# Patient Record
Sex: MALE | Race: WHITE | Employment: FULL TIME | URBAN - METROPOLITAN AREA
[De-identification: names, ages, dates, MRNs, and addresses within clinical notes are randomized per-mention and may not be internally consistent; named-entity substitution may affect disease eponyms.]

---

## 2018-02-25 ENCOUNTER — HOSPITAL ENCOUNTER (EMERGENCY)
Age: 28
Discharge: HOME | End: 2018-02-25
Attending: FAMILY MEDICINE
Payer: COMMERCIAL

## 2018-02-25 VITALS
OXYGEN SATURATION: 97 % | RESPIRATION RATE: 18 BRPM | DIASTOLIC BLOOD PRESSURE: 112 MMHG | SYSTOLIC BLOOD PRESSURE: 159 MMHG | WEIGHT: 315 LBS | BODY MASS INDEX: 36.45 KG/M2 | TEMPERATURE: 97.3 F | HEIGHT: 78 IN | HEART RATE: 103 BPM

## 2018-02-25 DIAGNOSIS — S02.5XXA CLOSED FRACTURE OF TOOTH, INITIAL ENCOUNTER: Primary | ICD-10-CM

## 2018-02-25 PROCEDURE — 99282 EMERGENCY DEPT VISIT SF MDM: CPT

## 2018-02-25 RX ORDER — TRAMADOL HYDROCHLORIDE 50 MG/1
50 TABLET ORAL EVERY 6 HOURS PRN
Qty: 12 TABLET | Refills: 0 | Status: SHIPPED | OUTPATIENT
Start: 2018-02-25 | End: 2018-02-28

## 2018-02-25 RX ORDER — AMOXICILLIN 500 MG/1
1000 CAPSULE ORAL 2 TIMES DAILY
Qty: 28 CAPSULE | Refills: 0 | Status: SHIPPED | OUTPATIENT
Start: 2018-02-25 | End: 2018-03-04

## 2018-02-25 ASSESSMENT — PAIN DESCRIPTION - ORIENTATION: ORIENTATION: RIGHT;LOWER

## 2018-02-25 ASSESSMENT — PAIN DESCRIPTION - PAIN TYPE: TYPE: ACUTE PAIN

## 2018-02-25 ASSESSMENT — PAIN DESCRIPTION - LOCATION: LOCATION: TEETH

## 2018-02-25 ASSESSMENT — ENCOUNTER SYMPTOMS
TRISMUS: 0
RESPIRATORY NEGATIVE: 1
GASTROINTESTINAL NEGATIVE: 1
FACIAL SWELLING: 0

## 2018-02-25 ASSESSMENT — PAIN SCALES - GENERAL: PAINLEVEL_OUTOF10: 2

## 2018-02-26 NOTE — ED PROVIDER NOTES
Musculoskeletal: Negative. Negative for neck pain. Neurological: Negative for headaches. Except as noted above the remainder of the review of systems was reviewed and negative. PAST MEDICAL HISTORY   History reviewed. No pertinent past medical history. SURGICAL HISTORY     History reviewed. No pertinent surgical history. CURRENT MEDICATIONS       Previous Medications    No medications on file       ALLERGIES     Patient has no known allergies. FAMILY HISTORY     History reviewed. No pertinent family history. SOCIAL HISTORY       Social History     Social History    Marital status: Single     Spouse name: N/A    Number of children: N/A    Years of education: N/A     Social History Main Topics    Smoking status: Never Smoker    Smokeless tobacco: Never Used    Alcohol use No    Drug use: No    Sexual activity: Not Asked     Other Topics Concern    None     Social History Narrative    None       SCREENINGS             PHYSICAL EXAM    (up to 7 for level 4, 8 or more for level 5)     ED Triage Vitals [02/25/18 2034]   BP Temp Temp Source Pulse Resp SpO2 Height Weight   (!) 159/112 97.3 °F (36.3 °C) Oral 103 18 97 % 6' 8\" (2.032 m) (!) 450 lb (204.1 kg)       Physical Exam   Constitutional: He appears well-developed and well-nourished. HENT:   Head: Normocephalic and atraumatic. Right lower bicuspid tooth noted fracture on exam   Cardiovascular: Normal rate and regular rhythm. Pulmonary/Chest: Effort normal and breath sounds normal.   Nursing note and vitals reviewed.       DIAGNOSTIC RESULTS     EKG: All EKG's are interpreted by the Emergency Department Physician who either signs or Co-signs this chart in the absence of a cardiologist.    RADIOLOGY:   Non-plain film images such as CT, Ultrasound and MRI are read by the radiologist. Plain radiographic images are visualized and preliminarily interpreted by the emergency physician with the below

## 2018-06-21 ENCOUNTER — HOSPITAL ENCOUNTER (EMERGENCY)
Age: 28
Discharge: HOME | End: 2018-06-21
Payer: COMMERCIAL

## 2018-06-21 VITALS
WEIGHT: 315 LBS | SYSTOLIC BLOOD PRESSURE: 177 MMHG | TEMPERATURE: 98.1 F | RESPIRATION RATE: 20 BRPM | BODY MASS INDEX: 36.45 KG/M2 | DIASTOLIC BLOOD PRESSURE: 112 MMHG | HEART RATE: 78 BPM | OXYGEN SATURATION: 96 % | HEIGHT: 78 IN

## 2018-06-21 DIAGNOSIS — K08.89 PAIN, DENTAL: Primary | ICD-10-CM

## 2018-06-21 PROCEDURE — 99282 EMERGENCY DEPT VISIT SF MDM: CPT

## 2018-06-21 RX ORDER — PENICILLIN V POTASSIUM 500 MG/1
500 TABLET ORAL 4 TIMES DAILY
Qty: 40 TABLET | Refills: 0 | Status: SHIPPED | OUTPATIENT
Start: 2018-06-21 | End: 2018-07-01

## 2018-06-21 RX ORDER — IBUPROFEN 800 MG/1
800 TABLET ORAL EVERY 8 HOURS PRN
Qty: 15 TABLET | Refills: 0 | Status: SHIPPED | OUTPATIENT
Start: 2018-06-21 | End: 2018-07-01

## 2018-06-21 ASSESSMENT — PAIN DESCRIPTION - PAIN TYPE: TYPE: ACUTE PAIN;CHRONIC PAIN

## 2018-06-21 ASSESSMENT — PAIN DESCRIPTION - LOCATION: LOCATION: TEETH

## 2018-06-21 ASSESSMENT — ENCOUNTER SYMPTOMS
RESPIRATORY NEGATIVE: 1
GASTROINTESTINAL NEGATIVE: 1
EYES NEGATIVE: 1

## 2018-06-21 ASSESSMENT — PAIN DESCRIPTION - ORIENTATION: ORIENTATION: RIGHT;LOWER

## 2018-06-21 ASSESSMENT — PAIN SCALES - GENERAL: PAINLEVEL_OUTOF10: 3

## 2018-07-01 ENCOUNTER — HOSPITAL ENCOUNTER (EMERGENCY)
Age: 28
Discharge: HOME | End: 2018-07-01
Payer: COMMERCIAL

## 2018-07-01 VITALS
RESPIRATION RATE: 16 BRPM | OXYGEN SATURATION: 98 % | WEIGHT: 315 LBS | TEMPERATURE: 97.6 F | SYSTOLIC BLOOD PRESSURE: 162 MMHG | DIASTOLIC BLOOD PRESSURE: 100 MMHG | HEART RATE: 80 BPM | BODY MASS INDEX: 36.45 KG/M2 | HEIGHT: 78 IN

## 2018-07-01 DIAGNOSIS — K08.89 PAIN, DENTAL: Primary | ICD-10-CM

## 2018-07-01 PROCEDURE — 6370000000 HC RX 637 (ALT 250 FOR IP): Performed by: NURSE PRACTITIONER

## 2018-07-01 PROCEDURE — 99282 EMERGENCY DEPT VISIT SF MDM: CPT

## 2018-07-01 RX ORDER — PENICILLIN V POTASSIUM 500 MG/1
500 TABLET ORAL 4 TIMES DAILY
Qty: 28 TABLET | Refills: 0 | Status: SHIPPED | OUTPATIENT
Start: 2018-07-01 | End: 2018-07-08

## 2018-07-01 RX ORDER — TRAMADOL HYDROCHLORIDE 50 MG/1
50 TABLET ORAL EVERY 4 HOURS PRN
Qty: 10 TABLET | Refills: 0 | Status: SHIPPED | OUTPATIENT
Start: 2018-07-01 | End: 2018-07-11

## 2018-07-01 RX ORDER — IBUPROFEN 800 MG/1
800 TABLET ORAL EVERY 8 HOURS PRN
Qty: 20 TABLET | Refills: 0 | Status: SHIPPED | OUTPATIENT
Start: 2018-07-01 | End: 2021-03-12

## 2018-07-01 RX ORDER — OXYCODONE HYDROCHLORIDE AND ACETAMINOPHEN 5; 325 MG/1; MG/1
2 TABLET ORAL ONCE
Status: COMPLETED | OUTPATIENT
Start: 2018-07-01 | End: 2018-07-01

## 2018-07-01 RX ADMIN — OXYCODONE HYDROCHLORIDE AND ACETAMINOPHEN 2 TABLET: 5; 325 TABLET ORAL at 14:09

## 2018-07-01 ASSESSMENT — PAIN SCALES - GENERAL
PAINLEVEL_OUTOF10: 7
PAINLEVEL_OUTOF10: 7

## 2018-07-01 ASSESSMENT — ENCOUNTER SYMPTOMS
VOMITING: 0
ABDOMINAL PAIN: 0
DIARRHEA: 0
COUGH: 0
SHORTNESS OF BREATH: 0
NAUSEA: 0

## 2018-07-01 ASSESSMENT — PAIN DESCRIPTION - ORIENTATION: ORIENTATION: RIGHT;LOWER

## 2018-07-01 ASSESSMENT — PAIN DESCRIPTION - LOCATION: LOCATION: MOUTH

## 2018-07-01 NOTE — ED PROVIDER NOTES
History     Social History    Marital status: Single     Spouse name: N/A    Number of children: N/A    Years of education: N/A     Social History Main Topics    Smoking status: Never Smoker    Smokeless tobacco: Never Used    Alcohol use No    Drug use: No    Sexual activity: Not Asked     Other Topics Concern    None     Social History Narrative    None       SCREENINGS             PHYSICAL EXAM    (up to 7 for level 4, 8 or more for level 5)     ED Triage Vitals [07/01/18 1319]   BP Temp Temp Source Pulse Resp SpO2 Height Weight   (!) 162/100 97.6 °F (36.4 °C) Oral 80 -- 98 % 6' 8\" (2.032 m) (!) 500 lb (226.8 kg)       Physical Exam   Constitutional: He is oriented to person, place, and time. He appears well-developed and well-nourished. He is active. No distress. HENT:   Head: Normocephalic and atraumatic. Mouth/Throat: Mucous membranes are normal. Abnormal dentition. Dental caries present. No dental abscesses. Neck: Normal range of motion. Neck supple. Cardiovascular: Normal rate, regular rhythm, normal heart sounds, intact distal pulses and normal pulses. Pulmonary/Chest: Effort normal and breath sounds normal.   Abdominal: Soft. Normal appearance and bowel sounds are normal. There is no tenderness. Neurological: He is alert and oriented to person, place, and time. He has normal strength. Skin: Skin is warm, dry and intact. No rash noted. He is not diaphoretic. Nursing note and vitals reviewed.       DIAGNOSTIC RESULTS     EKG: All EKG's are interpreted by the Emergency Department Physician who either signs or Co-signs this chart in the absence of a cardiologist.        RADIOLOGY:   Non-plain film images such as CT, Ultrasound and MRI are read by the radiologist. Plain radiographic images are visualized and preliminarily interpreted by the emergency physician with the below findings:        Interpretation per the Radiologist below, if available at the time of this note:    No orders

## 2019-02-13 ENCOUNTER — HOSPITAL ENCOUNTER (EMERGENCY)
Age: 29
Discharge: HOME | End: 2019-02-13
Payer: COMMERCIAL

## 2019-02-13 VITALS
WEIGHT: 315 LBS | HEIGHT: 78 IN | HEART RATE: 97 BPM | BODY MASS INDEX: 36.45 KG/M2 | TEMPERATURE: 97.8 F | SYSTOLIC BLOOD PRESSURE: 164 MMHG | OXYGEN SATURATION: 96 % | RESPIRATION RATE: 20 BRPM | DIASTOLIC BLOOD PRESSURE: 102 MMHG

## 2019-02-13 DIAGNOSIS — Z23 TETANUS TOXOID VACCINATION ADMINISTERED AT CURRENT VISIT: ICD-10-CM

## 2019-02-13 DIAGNOSIS — T14.8XXA ABRASION: Primary | ICD-10-CM

## 2019-02-13 PROCEDURE — 6360000002 HC RX W HCPCS: Performed by: PHYSICIAN ASSISTANT

## 2019-02-13 PROCEDURE — 90471 IMMUNIZATION ADMIN: CPT | Performed by: PHYSICIAN ASSISTANT

## 2019-02-13 PROCEDURE — 90715 TDAP VACCINE 7 YRS/> IM: CPT | Performed by: PHYSICIAN ASSISTANT

## 2019-02-13 PROCEDURE — 99282 EMERGENCY DEPT VISIT SF MDM: CPT

## 2019-02-13 PROCEDURE — 6370000000 HC RX 637 (ALT 250 FOR IP): Performed by: PHYSICIAN ASSISTANT

## 2019-02-13 RX ORDER — CEPHALEXIN 500 MG/1
500 CAPSULE ORAL ONCE
Status: COMPLETED | OUTPATIENT
Start: 2019-02-13 | End: 2019-02-13

## 2019-02-13 RX ORDER — CEPHALEXIN 500 MG/1
500 CAPSULE ORAL 4 TIMES DAILY
Qty: 40 CAPSULE | Refills: 0 | Status: SHIPPED | OUTPATIENT
Start: 2019-02-13 | End: 2020-05-29

## 2019-02-13 RX ADMIN — TETANUS TOXOID, REDUCED DIPHTHERIA TOXOID AND ACELLULAR PERTUSSIS VACCINE, ADSORBED 0.5 ML: 5; 2.5; 8; 8; 2.5 SUSPENSION INTRAMUSCULAR at 16:09

## 2019-02-13 RX ADMIN — CEPHALEXIN 500 MG: 500 CAPSULE ORAL at 16:09

## 2019-02-13 ASSESSMENT — PAIN DESCRIPTION - FREQUENCY: FREQUENCY: CONTINUOUS

## 2019-02-13 ASSESSMENT — PAIN DESCRIPTION - DESCRIPTORS: DESCRIPTORS: ACHING

## 2019-02-13 ASSESSMENT — PAIN DESCRIPTION - LOCATION: LOCATION: ARM

## 2019-02-13 ASSESSMENT — PAIN DESCRIPTION - PAIN TYPE: TYPE: ACUTE PAIN

## 2019-02-13 ASSESSMENT — PAIN SCALES - GENERAL: PAINLEVEL_OUTOF10: 2

## 2019-02-13 ASSESSMENT — PAIN DESCRIPTION - ORIENTATION: ORIENTATION: LEFT

## 2019-03-24 ENCOUNTER — HOSPITAL ENCOUNTER (EMERGENCY)
Age: 29
Discharge: HOME | End: 2019-03-24
Attending: FAMILY MEDICINE
Payer: COMMERCIAL

## 2019-03-24 VITALS
DIASTOLIC BLOOD PRESSURE: 99 MMHG | BODY MASS INDEX: 36.45 KG/M2 | SYSTOLIC BLOOD PRESSURE: 153 MMHG | OXYGEN SATURATION: 97 % | TEMPERATURE: 97.4 F | WEIGHT: 315 LBS | HEIGHT: 78 IN | RESPIRATION RATE: 19 BRPM | HEART RATE: 98 BPM
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
## 2019-03-24 DIAGNOSIS — S80.862A NONVENOMOUS INSECT BITE OF LEFT LOWER EXTREMITY, INITIAL ENCOUNTER: Primary | ICD-10-CM

## 2019-03-24 PROCEDURE — 6370000000 HC RX 637 (ALT 250 FOR IP): Performed by: FAMILY MEDICINE

## 2019-03-24 PROCEDURE — 99281 EMR DPT VST MAYX REQ PHY/QHP: CPT

## 2019-03-24 RX ORDER — AMOXICILLIN AND CLAVULANATE POTASSIUM 875; 125 MG/1; MG/1
1 TABLET, FILM COATED ORAL 2 TIMES DAILY
Qty: 20 TABLET | Refills: 0 | Status: SHIPPED | OUTPATIENT
Start: 2019-03-24 | End: 2019-04-03

## 2019-03-24 RX ORDER — AMOXICILLIN AND CLAVULANATE POTASSIUM 875; 125 MG/1; MG/1
1 TABLET, FILM COATED ORAL ONCE
Status: COMPLETED | OUTPATIENT
Start: 2019-03-24 | End: 2019-03-24

## 2019-03-24 RX ADMIN — AMOXICILLIN AND CLAVULANATE POTASSIUM 1 TABLET: 875; 125 TABLET, FILM COATED ORAL at 20:54

## 2019-03-24 ASSESSMENT — ENCOUNTER SYMPTOMS
ALLERGIC/IMMUNOLOGIC NEGATIVE: 1
ABDOMINAL DISTENTION: 0
COUGH: 1
EYES NEGATIVE: 1
GASTROINTESTINAL NEGATIVE: 1

## 2019-03-24 ASSESSMENT — PAIN DESCRIPTION - PAIN TYPE: TYPE: ACUTE PAIN

## 2019-03-24 ASSESSMENT — PAIN DESCRIPTION - ORIENTATION: ORIENTATION: LEFT

## 2019-03-24 ASSESSMENT — PAIN DESCRIPTION - DESCRIPTORS: DESCRIPTORS: DISCOMFORT

## 2019-03-24 ASSESSMENT — PAIN DESCRIPTION - LOCATION: LOCATION: ANKLE

## 2019-03-24 ASSESSMENT — PAIN DESCRIPTION - FREQUENCY: FREQUENCY: INTERMITTENT

## 2019-12-27 ENCOUNTER — HOSPITAL ENCOUNTER (EMERGENCY)
Age: 29
Discharge: HOME | End: 2019-12-27

## 2019-12-27 VITALS
TEMPERATURE: 98.3 F | HEART RATE: 99 BPM | SYSTOLIC BLOOD PRESSURE: 137 MMHG | DIASTOLIC BLOOD PRESSURE: 85 MMHG | HEIGHT: 78 IN | RESPIRATION RATE: 18 BRPM | BODY MASS INDEX: 36.45 KG/M2 | OXYGEN SATURATION: 98 % | WEIGHT: 315 LBS

## 2019-12-27 DIAGNOSIS — J11.1 INFLUENZA: Primary | ICD-10-CM

## 2019-12-27 LAB
INFLUENZA A BY PCR: NEGATIVE
INFLUENZA B BY PCR: POSITIVE
STREP GRP A PCR: NEGATIVE

## 2019-12-27 PROCEDURE — 87651 STREP A DNA AMP PROBE: CPT

## 2019-12-27 PROCEDURE — 99283 EMERGENCY DEPT VISIT LOW MDM: CPT

## 2019-12-27 PROCEDURE — 87502 INFLUENZA DNA AMP PROBE: CPT

## 2019-12-27 RX ORDER — IBUPROFEN 800 MG/1
800 TABLET ORAL EVERY 8 HOURS PRN
Qty: 20 TABLET | Refills: 0 | Status: SHIPPED | OUTPATIENT
Start: 2019-12-27 | End: 2021-03-12

## 2019-12-27 RX ORDER — BENZONATATE 100 MG/1
100-200 CAPSULE ORAL 3 TIMES DAILY PRN
Qty: 60 CAPSULE | Refills: 0 | Status: SHIPPED | OUTPATIENT
Start: 2019-12-27 | End: 2020-01-03

## 2019-12-27 RX ORDER — OSELTAMIVIR PHOSPHATE 75 MG/1
75 CAPSULE ORAL 2 TIMES DAILY
Qty: 10 CAPSULE | Refills: 0 | Status: SHIPPED | OUTPATIENT
Start: 2019-12-27 | End: 2020-01-01

## 2019-12-27 ASSESSMENT — ENCOUNTER SYMPTOMS
SORE THROAT: 1
SHORTNESS OF BREATH: 0
NAUSEA: 0
DIARRHEA: 0
RHINORRHEA: 1
EYE PAIN: 0
COUGH: 1
PHOTOPHOBIA: 0
BACK PAIN: 0
VOMITING: 0
ABDOMINAL PAIN: 0

## 2020-05-29 ENCOUNTER — HOSPITAL ENCOUNTER (EMERGENCY)
Age: 30
Discharge: HOME | End: 2020-05-29

## 2020-05-29 VITALS
HEIGHT: 78 IN | SYSTOLIC BLOOD PRESSURE: 153 MMHG | DIASTOLIC BLOOD PRESSURE: 105 MMHG | HEART RATE: 95 BPM | OXYGEN SATURATION: 96 % | RESPIRATION RATE: 20 BRPM | BODY MASS INDEX: 36.45 KG/M2 | TEMPERATURE: 96.9 F | WEIGHT: 315 LBS

## 2020-05-29 PROCEDURE — 6370000000 HC RX 637 (ALT 250 FOR IP): Performed by: PHYSICIAN ASSISTANT

## 2020-05-29 PROCEDURE — 99283 EMERGENCY DEPT VISIT LOW MDM: CPT

## 2020-05-29 RX ORDER — SULFAMETHOXAZOLE AND TRIMETHOPRIM 800; 160 MG/1; MG/1
2 TABLET ORAL 2 TIMES DAILY
Qty: 40 TABLET | Refills: 0 | Status: SHIPPED | OUTPATIENT
Start: 2020-05-29 | End: 2020-06-08

## 2020-05-29 RX ORDER — CEPHALEXIN 500 MG/1
500 CAPSULE ORAL ONCE
Status: COMPLETED | OUTPATIENT
Start: 2020-05-29 | End: 2020-05-29

## 2020-05-29 RX ORDER — CEPHALEXIN 500 MG/1
500 CAPSULE ORAL 4 TIMES DAILY
Qty: 40 CAPSULE | Refills: 0 | Status: SHIPPED | OUTPATIENT
Start: 2020-05-29 | End: 2021-03-12

## 2020-05-29 RX ORDER — SULFAMETHOXAZOLE AND TRIMETHOPRIM 800; 160 MG/1; MG/1
1 TABLET ORAL ONCE
Status: COMPLETED | OUTPATIENT
Start: 2020-05-29 | End: 2020-05-29

## 2020-05-29 RX ADMIN — SULFAMETHOXAZOLE AND TRIMETHOPRIM 1 TABLET: 800; 160 TABLET ORAL at 10:22

## 2020-05-29 RX ADMIN — CEPHALEXIN 500 MG: 500 CAPSULE ORAL at 10:23

## 2020-05-29 ASSESSMENT — ENCOUNTER SYMPTOMS
GASTROINTESTINAL NEGATIVE: 1
EYES NEGATIVE: 1
RESPIRATORY NEGATIVE: 1
COLOR CHANGE: 1

## 2020-05-29 ASSESSMENT — PAIN DESCRIPTION - LOCATION: LOCATION: LEG

## 2020-05-29 ASSESSMENT — PAIN SCALES - GENERAL: PAINLEVEL_OUTOF10: 4

## 2020-05-29 ASSESSMENT — PAIN DESCRIPTION - PAIN TYPE: TYPE: ACUTE PAIN

## 2020-05-29 ASSESSMENT — PAIN DESCRIPTION - ORIENTATION: ORIENTATION: RIGHT;LEFT

## 2020-05-29 NOTE — ED NOTES
Discharge instructions given to pt by [facility] RN. No questions at this time. Pt ambulatory in stable condition at discharge.      Milagros Cat RN  05/29/20 1038

## 2020-05-29 NOTE — ED PROVIDER NOTES
Pupils are equal, round, and reactive to light. Neck:      Musculoskeletal: Normal range of motion and neck supple. Cardiovascular:      Rate and Rhythm: Normal rate and regular rhythm. Heart sounds: No murmur. Pulmonary:      Effort: No respiratory distress. Breath sounds: Normal breath sounds. No wheezing or rales. Abdominal:      General: There is no distension. Palpations: Abdomen is soft. Tenderness: There is no abdominal tenderness. Musculoskeletal: Normal range of motion. Skin:     General: Skin is warm and dry. Findings: Erythema and rash present. Comments: Patient has bilateral lower leg erythema that blanches on palpation. No drainage or blistering noted. Bilateral feet are spared as well as above the knee. Neurological:      Mental Status: He is alert and oriented to person, place, and time. Cranial Nerves: No cranial nerve deficit. Psychiatric:         Judgment: Judgment normal.           All other labs were within normal range or not returned as of this dictation. EMERGENCY DEPARTMENT COURSE and DIFFERENTIALDIAGNOSIS/MDM:   Vitals:    Vitals:    05/29/20 1002 05/29/20 1027   BP: (!) 155/115 (!) 153/105   Pulse: 99 95   Resp:  20   Temp: 96.9 °F (36.1 °C)    TempSrc: Temporal    SpO2: 96%    Weight: (!) 450 lb (204.1 kg)    Height: 6' 8\" (2.032 m)         Patient slightly nervous and states he typically has slightly elevated blood pressure in doctor's offices. Patient requesting referral to primary care physician as well as dentist.  Patient will be treated for acute cellulitis with Bactrim and Keflex. Patient advised to rest and elevate legs over the weekend and remain in a cool environment and wear shorts. Wash daily with antibacterial soap gently. Patient is to return here immediately if symptoms worsen or if new concerning symptoms arise.   He verbalizes understanding of plan at discharge and has no further

## 2021-03-12 ENCOUNTER — HOSPITAL ENCOUNTER (EMERGENCY)
Age: 31
Discharge: HOME | End: 2021-03-12
Payer: MEDICAID

## 2021-03-12 ENCOUNTER — APPOINTMENT (OUTPATIENT)
Dept: GENERAL RADIOLOGY | Age: 31
End: 2021-03-12
Payer: MEDICAID

## 2021-03-12 VITALS
HEIGHT: 78 IN | SYSTOLIC BLOOD PRESSURE: 146 MMHG | WEIGHT: 315 LBS | OXYGEN SATURATION: 96 % | RESPIRATION RATE: 20 BRPM | DIASTOLIC BLOOD PRESSURE: 103 MMHG | HEART RATE: 113 BPM | TEMPERATURE: 97.7 F | BODY MASS INDEX: 36.45 KG/M2

## 2021-03-12 DIAGNOSIS — J40 BRONCHITIS: Primary | ICD-10-CM

## 2021-03-12 PROCEDURE — 94640 AIRWAY INHALATION TREATMENT: CPT

## 2021-03-12 PROCEDURE — 6370000000 HC RX 637 (ALT 250 FOR IP): Performed by: NURSE PRACTITIONER

## 2021-03-12 PROCEDURE — 71046 X-RAY EXAM CHEST 2 VIEWS: CPT

## 2021-03-12 PROCEDURE — 99283 EMERGENCY DEPT VISIT LOW MDM: CPT

## 2021-03-12 PROCEDURE — 94761 N-INVAS EAR/PLS OXIMETRY MLT: CPT

## 2021-03-12 RX ORDER — METHYLPREDNISOLONE 4 MG/1
TABLET ORAL
Qty: 1 KIT | Refills: 0 | Status: SHIPPED | OUTPATIENT
Start: 2021-03-12 | End: 2021-03-18

## 2021-03-12 RX ORDER — BROMPHENIRAMINE MALEATE, PSEUDOEPHEDRINE HYDROCHLORIDE, AND DEXTROMETHORPHAN HYDROBROMIDE 2; 30; 10 MG/5ML; MG/5ML; MG/5ML
5 SYRUP ORAL 4 TIMES DAILY PRN
Qty: 180 ML | Refills: 0 | Status: SHIPPED | OUTPATIENT
Start: 2021-03-12 | End: 2021-07-30

## 2021-03-12 RX ORDER — IPRATROPIUM BROMIDE AND ALBUTEROL SULFATE 2.5; .5 MG/3ML; MG/3ML
1 SOLUTION RESPIRATORY (INHALATION) EVERY 10 MIN PRN
Status: DISCONTINUED | OUTPATIENT
Start: 2021-03-12 | End: 2021-03-12

## 2021-03-12 RX ORDER — PREDNISONE 20 MG/1
60 TABLET ORAL ONCE
Status: COMPLETED | OUTPATIENT
Start: 2021-03-12 | End: 2021-03-12

## 2021-03-12 RX ORDER — AZITHROMYCIN 250 MG/1
TABLET, FILM COATED ORAL
Qty: 6 TABLET | Refills: 0 | Status: SHIPPED | OUTPATIENT
Start: 2021-03-12 | End: 2021-03-22

## 2021-03-12 RX ORDER — ALBUTEROL SULFATE 90 UG/1
2 AEROSOL, METERED RESPIRATORY (INHALATION) 4 TIMES DAILY PRN
Qty: 1 INHALER | Refills: 0 | Status: SHIPPED | OUTPATIENT
Start: 2021-03-12 | End: 2021-07-30

## 2021-03-12 RX ADMIN — IPRATROPIUM BROMIDE AND ALBUTEROL SULFATE 1 AMPULE: .5; 3 SOLUTION RESPIRATORY (INHALATION) at 02:24

## 2021-03-12 RX ADMIN — PREDNISONE 60 MG: 20 TABLET ORAL at 02:11

## 2021-03-12 ASSESSMENT — ENCOUNTER SYMPTOMS
NAUSEA: 0
CHEST TIGHTNESS: 1
COLOR CHANGE: 0
COUGH: 1
SINUS PAIN: 0
ABDOMINAL PAIN: 0
BACK PAIN: 0
DIARRHEA: 1
TROUBLE SWALLOWING: 0
VOMITING: 0
WHEEZING: 1
SORE THROAT: 0
SINUS PRESSURE: 0
ABDOMINAL DISTENTION: 0
SHORTNESS OF BREATH: 1
CONSTIPATION: 0
RHINORRHEA: 0

## 2021-03-12 NOTE — ED TRIAGE NOTES
Patient arrived from home with c/o COVID symptoms. States he tested positive for COVID last month and the symptoms still aren't going away. Patient complains of SOB, Fatigue,Coug, Diarrhea. Vitals are stable.

## 2021-03-12 NOTE — ED PROVIDER NOTES
[address] [facility] ED  EMERGENCY DEPARTMENT ENCOUNTER      Pt Name: Katie Hariston  MRN: [id number]  [place] 1990  Date of evaluation: 3/12/2021  Provider: Erasto Martinez       Chief Complaint   Patient presents with    Illness     +COVID last month. c/o COVID symptoms         HISTORY OF PRESENT ILLNESS   (Location/Symptom, Timing/Onset,Context/Setting, Quality, Duration, Modifying Factors, Severity)  Note limiting factors. Katie Hairston is a 27 y.o. male who presents to the emergency department for complaint of fatigue shortness of breath and wheezing for the past week. Additionally patient states he has had some diarrhea. He states the symptoms have been present since he had Covid in late January. States the symptoms come in waves but generally last for a few days at a time. He states that the symptoms are present for the past week along with the diarrhea. He denies any pain or discomfort states that he has some chest tightness from the shortness of breath and wheezing. He has no history of asthma. He states he had similar symptoms in a Covid but during Covid he was much worse than he is now. He states that overall the worst symptom is the fatigue. States he feels very tight and wheezy short of breath as he did when he had Covid. He denies any known fevers chills sweats or other body aches. Denies abdominal pain nausea vomiting. Nursing Notes were reviewed. REVIEW OF SYSTEMS    (2-9 systems for level 4, 10 or more for level 5)     Review of Systems   Constitutional: Positive for fatigue. Negative for activity change, appetite change, chills, diaphoresis and fever. HENT: Negative for congestion, ear pain, rhinorrhea, sinus pressure, sinus pain, sore throat and trouble swallowing. Eyes: Negative for visual disturbance. Respiratory: Positive for cough, chest tightness, shortness of breath and wheezing.     Cardiovascular: Negative for chest pain and palpitations. Gastrointestinal: Positive for diarrhea. Negative for abdominal distention, abdominal pain, constipation, nausea and vomiting. Genitourinary: Negative for difficulty urinating, dysuria, flank pain, frequency, hematuria and urgency. Musculoskeletal: Negative for arthralgias, back pain, myalgias, neck pain and neck stiffness. Skin: Negative for color change and rash. Neurological: Negative for dizziness, tremors, seizures, syncope, speech difficulty, weakness, light-headedness, numbness and headaches. Except as noted above the remainder of the review of systems was reviewed and negative. PAST MEDICAL HISTORY   History reviewed. No pertinent past medical history. History reviewed. No pertinent surgical history.   Social History     Socioeconomic History    Marital status: Single     Spouse name: None    Number of children: None    Years of education: None    Highest education level: None   Occupational History    None   Social Needs    Financial resource strain: None    Food insecurity     Worry: None     Inability: None    Transportation needs     Medical: None     Non-medical: None   Tobacco Use    Smoking status: Never Smoker    Smokeless tobacco: Never Used   Substance and Sexual Activity    Alcohol use: No    Drug use: No    Sexual activity: None   Lifestyle    Physical activity     Days per week: None     Minutes per session: None    Stress: None   Relationships    Social connections     Talks on phone: None     Gets together: None     Attends Mandaen service: None     Active member of club or organization: None     Attends meetings of clubs or organizations: None     Relationship status: None    Intimate partner violence     Fear of current or ex partner: None     Emotionally abused: None     Physically abused: None     Forced sexual activity: None   Other Topics Concern    None   Social History Narrative    None       SCREENINGS PHYSICAL EXAM    (up to 7 for level 4, 8 or more for level 5)     ED Triage Vitals [03/12/21 0104]   BP Temp Temp src Pulse Resp SpO2 Height Weight   (!) 151/116 97.7 °F (36.5 °C) -- 127 22 96 % 6' 8\" (2.032 m) (!) 450 lb (204.1 kg)       Physical Exam  Constitutional:       General: He is not in acute distress. Appearance: Normal appearance. He is obese. He is not ill-appearing, toxic-appearing or diaphoretic. HENT:      Head: Normocephalic and atraumatic. Right Ear: External ear normal.      Left Ear: External ear normal.   Eyes:      General:         Right eye: No discharge. Left eye: No discharge. Conjunctiva/sclera: Conjunctivae normal.      Pupils: Pupils are equal, round, and reactive to light. Neck:      Musculoskeletal: Normal range of motion and neck supple. No neck rigidity or muscular tenderness. Cardiovascular:      Rate and Rhythm: Normal rate and regular rhythm. Pulses: Normal pulses. Pulmonary:      Effort: Pulmonary effort is normal. No respiratory distress. Breath sounds: No stridor. Examination of the right-upper field reveals wheezing. Examination of the left-upper field reveals wheezing. Examination of the right-middle field reveals wheezing. Examination of the left-middle field reveals wheezing. Examination of the right-lower field reveals wheezing. Examination of the left-lower field reveals wheezing. Wheezing present. Abdominal:      General: Bowel sounds are normal. There is no distension. Palpations: Abdomen is soft. Tenderness: There is no abdominal tenderness. Musculoskeletal: Normal range of motion. General: No tenderness or signs of injury. Skin:     General: Skin is warm and dry. Capillary Refill: Capillary refill takes less than 2 seconds. Neurological:      General: No focal deficit present. Mental Status: He is alert and oriented to person, place, and time. Mental status is at baseline.       Cranial Nerves: No cranial nerve deficit. Sensory: No sensory deficit. Motor: No weakness. Coordination: Coordination normal.         RESULTS     EKG: All EKG's are interpreted by the Emergency Department Physician who either signs or Co-signsthis chart in the absence of a cardiologist.        RADIOLOGY:   Ellaree Manson such as CT, Ultrasound and MRI are read by the radiologist. Plain radiographic images are visualized and preliminarily interpreted by the emergency physician with the below findings:    2 view chest x-ray negative for acute process no focal infiltrate or effusion    Interpretation per the Radiologist below, if available at the time ofthis note:    XR CHEST (2 VW)    (Results Pending)         ED BEDSIDE ULTRASOUND:   Performed by ED Physician - none    LABS:  Labs Reviewed - No data to display    All other labs were within normal range or not returned as of this dictation. EMERGENCY DEPARTMENT COURSE and DIFFERENTIAL DIAGNOSIS/MDM:   Vitals:    Vitals:    03/12/21 0104 03/12/21 0208 03/12/21 0224 03/12/21 0300   BP: (!) 151/116 (!) 138/97  (!) 146/103   Pulse: 127 115  113   Resp: 22 20 20    Temp: 97.7 °F (36.5 °C)      SpO2: 96% 96% 98% 96%   Weight: (!) 450 lb (204.1 kg)      Height: 6' 8\" (2.032 m)               MDM patient presents afebrile nontoxic no acute distress not have elevated heart rate arrival with clear shortness of breath. There are wheezes inspiratory expiratory throughout all fields. Chest x-ray shows no infiltrate or effusion. Patient does have symptoms suggestive of bronchitis. Patient was given a breathing treatment heart rate did decrease he is more comfortable slight increased after the albuterol treatment as expected. Patient states he feels much better after the treatment and is stable be discharged home with additional medications include azithromycin antibiotic coverage as well as steroids inhaler and cough medication.   Directed to follow-up primary care provider soon as possible for evaluation return to the ER for any onset of new concerning symptom worsening condition. Patient verbalized understanding of all given instructions education. CRITICAL CARE TIME       CONSULTS:  None    PROCEDURES:  Unless otherwise noted below, none     Procedures    FINAL IMPRESSION      1. Bronchitis          DISPOSITION/PLAN   DISPOSITION        PATIENT REFERRED TO:  [facility]  [address] [place] [address]  [phone]  Call in 1 day        DISCHARGE MEDICATIONS:  New Prescriptions    ALBUTEROL SULFATE HFA (VENTOLIN HFA) 108 (90 BASE) MCG/ACT INHALER    Inhale 2 puffs into the lungs 4 times daily as needed for Wheezing    AZITHROMYCIN (ZITHROMAX) 250 MG TABLET    Take 2 tabs DAY 1 then 1 tab DAYS 2-5    BROMPHENIRAMINE-PSEUDOEPHEDRINE-DM 2-30-10 MG/5ML SYRUP    Take 5 mLs by mouth 4 times daily as needed for Congestion or Cough    METHYLPREDNISOLONE (MEDROL, RADHA,) 4 MG TABLET    Take by mouth.           (Please notethat portions of this note were completed with a voice recognition program.  Efforts were made to edit the dictations but occasionally words are mis-transcribed.)    Ulysses Mu, APRN - CNP (electronically signed)  Attending Emergency Physician         Ulysses Mu, APRN - CNP  03/12/21 5317

## 2021-03-12 NOTE — ED NOTES
D/C instructions given to patient no questions ask. Patient verbalized understanding and ambulated from ED without any complications.      Rosmery Sadler RN  03/12/21 0097

## 2021-04-05 ENCOUNTER — HOSPITAL ENCOUNTER (INPATIENT)
Age: 31
LOS: 1 days | Discharge: ANOTHER HOSPITAL | DRG: 871 | End: 2021-04-06
Attending: EMERGENCY MEDICINE | Admitting: INTERNAL MEDICINE
Payer: MEDICAID

## 2021-04-05 ENCOUNTER — APPOINTMENT (OUTPATIENT)
Dept: GENERAL RADIOLOGY | Age: 31
DRG: 871 | End: 2021-04-05

## 2021-04-05 ENCOUNTER — APPOINTMENT (OUTPATIENT)
Dept: CT IMAGING | Age: 31
DRG: 871 | End: 2021-04-05

## 2021-04-05 DIAGNOSIS — J96.01 ACUTE RESPIRATORY FAILURE WITH HYPOXIA (HCC): ICD-10-CM

## 2021-04-05 DIAGNOSIS — U07.1 COVID-19: Primary | ICD-10-CM

## 2021-04-05 LAB
ALBUMIN SERPL-MCNC: 3.5 G/DL (ref 3.5–4.6)
ALBUMIN SERPL-MCNC: 3.6 G/DL (ref 3.5–4.6)
ALP BLD-CCNC: 140 U/L (ref 35–104)
ALP BLD-CCNC: 152 U/L (ref 35–104)
ALT SERPL-CCNC: 51 U/L (ref 0–41)
ALT SERPL-CCNC: 53 U/L (ref 0–41)
ANION GAP SERPL CALCULATED.3IONS-SCNC: 14 MEQ/L (ref 9–15)
ANION GAP SERPL CALCULATED.3IONS-SCNC: 15 MEQ/L (ref 9–15)
AST SERPL-CCNC: 74 U/L (ref 0–40)
AST SERPL-CCNC: 80 U/L (ref 0–40)
BASE EXCESS ARTERIAL: -3 (ref -3–3)
BASE EXCESS ARTERIAL: -4 (ref -3–3)
BASOPHILS ABSOLUTE: 0 K/UL (ref 0–0.2)
BASOPHILS ABSOLUTE: 0 K/UL (ref 0–0.2)
BASOPHILS RELATIVE PERCENT: 0.4 %
BASOPHILS RELATIVE PERCENT: 0.6 %
BILIRUB SERPL-MCNC: 0.8 MG/DL (ref 0.2–0.7)
BILIRUB SERPL-MCNC: 0.9 MG/DL (ref 0.2–0.7)
BUN BLDV-MCNC: 12 MG/DL (ref 6–20)
BUN BLDV-MCNC: 12 MG/DL (ref 6–20)
C-REACTIVE PROTEIN: 171.8 MG/L (ref 0–5)
CALCIUM IONIZED: 1.04 MMOL/L (ref 1.12–1.32)
CALCIUM IONIZED: 1.06 MMOL/L (ref 1.12–1.32)
CALCIUM SERPL-MCNC: 8.7 MG/DL (ref 8.5–9.9)
CALCIUM SERPL-MCNC: 8.9 MG/DL (ref 8.5–9.9)
CHLORIDE BLD-SCNC: 91 MEQ/L (ref 95–107)
CHLORIDE BLD-SCNC: 93 MEQ/L (ref 95–107)
CO2: 22 MEQ/L (ref 20–31)
CO2: 23 MEQ/L (ref 20–31)
CREAT SERPL-MCNC: 0.65 MG/DL (ref 0.7–1.2)
CREAT SERPL-MCNC: 0.65 MG/DL (ref 0.7–1.2)
D DIMER: 1.3 MG/L FEU (ref 0–0.5)
D DIMER: 1.66 MG/L FEU (ref 0–0.5)
EKG ATRIAL RATE: 123 BPM
EKG ATRIAL RATE: 127 BPM
EKG P AXIS: 55 DEGREES
EKG P AXIS: 56 DEGREES
EKG P-R INTERVAL: 146 MS
EKG P-R INTERVAL: 146 MS
EKG Q-T INTERVAL: 306 MS
EKG Q-T INTERVAL: 340 MS
EKG QRS DURATION: 100 MS
EKG QRS DURATION: 106 MS
EKG QTC CALCULATION (BAZETT): 444 MS
EKG QTC CALCULATION (BAZETT): 486 MS
EKG R AXIS: -31 DEGREES
EKG R AXIS: -33 DEGREES
EKG T AXIS: 50 DEGREES
EKG T AXIS: 54 DEGREES
EKG VENTRICULAR RATE: 123 BPM
EKG VENTRICULAR RATE: 127 BPM
EOSINOPHILS ABSOLUTE: 0 K/UL (ref 0–0.7)
EOSINOPHILS ABSOLUTE: 0 K/UL (ref 0–0.7)
EOSINOPHILS RELATIVE PERCENT: 0 %
EOSINOPHILS RELATIVE PERCENT: 0 %
GFR AFRICAN AMERICAN: >60
GFR NON-AFRICAN AMERICAN: >60
GLOBULIN: 3.5 G/DL (ref 2.3–3.5)
GLOBULIN: 3.9 G/DL (ref 2.3–3.5)
GLUCOSE BLD-MCNC: 140 MG/DL (ref 60–115)
GLUCOSE BLD-MCNC: 177 MG/DL (ref 60–115)
GLUCOSE BLD-MCNC: 180 MG/DL (ref 60–115)
GLUCOSE BLD-MCNC: 188 MG/DL (ref 60–115)
GLUCOSE BLD-MCNC: 220 MG/DL (ref 60–115)
GLUCOSE BLD-MCNC: 257 MG/DL (ref 60–115)
GLUCOSE BLD-MCNC: 258 MG/DL (ref 70–99)
GLUCOSE BLD-MCNC: 306 MG/DL (ref 70–99)
GLUCOSE BLD-MCNC: 318 MG/DL (ref 60–115)
GLUCOSE BLD-MCNC: 321 MG/DL (ref 60–115)
GLUCOSE BLD-MCNC: 359 MG/DL (ref 60–115)
GLUCOSE BLD-MCNC: 363 MG/DL (ref 60–115)
GLUCOSE BLD-MCNC: 366 MG/DL (ref 60–115)
GLUCOSE BLD-MCNC: 386 MG/DL (ref 60–115)
GLUCOSE BLD-MCNC: 438 MG/DL (ref 60–115)
HBA1C MFR BLD: 11.2 % (ref 4.8–5.9)
HBA1C MFR BLD: 11.3 % (ref 4.8–5.9)
HCO3 ARTERIAL: 20.1 MMOL/L (ref 21–29)
HCO3 ARTERIAL: 23 MMOL/L (ref 21–29)
HCT VFR BLD CALC: 40.9 % (ref 42–52)
HCT VFR BLD CALC: 44 % (ref 42–52)
HEMOGLOBIN: 13.6 G/DL (ref 14–18)
HEMOGLOBIN: 13.6 GM/DL (ref 13.5–17.5)
HEMOGLOBIN: 14.6 G/DL (ref 14–18)
HEMOGLOBIN: 15.2 GM/DL (ref 13.5–17.5)
LACTATE: 2.01 MMOL/L (ref 0.4–2)
LACTATE: 3.34 MMOL/L (ref 0.4–2)
LYMPHOCYTES ABSOLUTE: 0.7 K/UL (ref 1–4.8)
LYMPHOCYTES ABSOLUTE: 0.9 K/UL (ref 1–4.8)
LYMPHOCYTES RELATIVE PERCENT: 10.3 %
LYMPHOCYTES RELATIVE PERCENT: 12.5 %
MCH RBC QN AUTO: 26.6 PG (ref 27–31.3)
MCH RBC QN AUTO: 26.7 PG (ref 27–31.3)
MCHC RBC AUTO-ENTMCNC: 33.3 % (ref 33–37)
MCHC RBC AUTO-ENTMCNC: 33.3 % (ref 33–37)
MCV RBC AUTO: 80 FL (ref 80–100)
MCV RBC AUTO: 80.2 FL (ref 80–100)
MONOCYTES ABSOLUTE: 0.3 K/UL (ref 0.2–0.8)
MONOCYTES ABSOLUTE: 0.3 K/UL (ref 0.2–0.8)
MONOCYTES RELATIVE PERCENT: 4.3 %
MONOCYTES RELATIVE PERCENT: 4.4 %
NEUTROPHILS ABSOLUTE: 5.4 K/UL (ref 1.4–6.5)
NEUTROPHILS ABSOLUTE: 5.9 K/UL (ref 1.4–6.5)
NEUTROPHILS RELATIVE PERCENT: 82.8 %
NEUTROPHILS RELATIVE PERCENT: 84.7 %
O2 SAT, ARTERIAL: 100 % (ref 93–100)
O2 SAT, ARTERIAL: 96 % (ref 93–100)
PCO2 ARTERIAL: 27 MM HG (ref 35–45)
PCO2 ARTERIAL: 53 MM HG (ref 35–45)
PDW BLD-RTO: 15.7 % (ref 11.5–14.5)
PDW BLD-RTO: 15.8 % (ref 11.5–14.5)
PERFORMED ON: ABNORMAL
PH ARTERIAL: 7.25 (ref 7.35–7.45)
PH ARTERIAL: 7.48 (ref 7.35–7.45)
PLATELET # BLD: 201 K/UL (ref 130–400)
PLATELET # BLD: 215 K/UL (ref 130–400)
PO2 ARTERIAL: 450 MM HG (ref 75–108)
PO2 ARTERIAL: 74 MM HG (ref 75–108)
POC CHLORIDE: 95 MEQ/L (ref 99–110)
POC CHLORIDE: 97 MEQ/L (ref 99–110)
POC CREATININE WHOLE BLOOD: 0.6
POC CREATININE: 0.5 MG/DL (ref 0.9–1.3)
POC CREATININE: 0.6 MG/DL (ref 0.9–1.3)
POC CREATININE: 1 MG/DL (ref 0.9–1.3)
POC FIO2: 100
POC FIO2: 5
POC HEMATOCRIT: 40 % (ref 41–53)
POC HEMATOCRIT: 45 % (ref 41–53)
POC POTASSIUM: 3.5 MEQ/L (ref 3.5–5.1)
POC POTASSIUM: 6.1 MEQ/L (ref 3.5–5.1)
POC SAMPLE TYPE: ABNORMAL
POC SODIUM: 127 MEQ/L (ref 136–145)
POC SODIUM: 127 MEQ/L (ref 136–145)
POTASSIUM REFLEX MAGNESIUM: 4.1 MEQ/L (ref 3.4–4.9)
POTASSIUM SERPL-SCNC: 3.9 MEQ/L (ref 3.4–4.9)
PROCALCITONIN: 0.19 NG/ML (ref 0–0.15)
PROCALCITONIN: 0.24 NG/ML (ref 0–0.15)
RBC # BLD: 5.12 M/UL (ref 4.7–6.1)
RBC # BLD: 5.49 M/UL (ref 4.7–6.1)
SARS-COV-2, NAAT: DETECTED
SODIUM BLD-SCNC: 128 MEQ/L (ref 135–144)
SODIUM BLD-SCNC: 130 MEQ/L (ref 135–144)
TCO2 ARTERIAL: 21 (ref 22–29)
TCO2 ARTERIAL: 25 (ref 22–29)
TOTAL PROTEIN: 7 G/DL (ref 6.3–8)
TOTAL PROTEIN: 7.5 G/DL (ref 6.3–8)
TROPONIN: <0.01 NG/ML (ref 0–0.01)
WBC # BLD: 6.4 K/UL (ref 4.8–10.8)
WBC # BLD: 7.2 K/UL (ref 4.8–10.8)

## 2021-04-05 PROCEDURE — 2580000003 HC RX 258: Performed by: INTERNAL MEDICINE

## 2021-04-05 PROCEDURE — 82803 BLOOD GASES ANY COMBINATION: CPT

## 2021-04-05 PROCEDURE — 85014 HEMATOCRIT: CPT

## 2021-04-05 PROCEDURE — 99254 IP/OBS CNSLTJ NEW/EST MOD 60: CPT | Performed by: INTERNAL MEDICINE

## 2021-04-05 PROCEDURE — 82330 ASSAY OF CALCIUM: CPT

## 2021-04-05 PROCEDURE — 2000000000 HC ICU R&B

## 2021-04-05 PROCEDURE — 84484 ASSAY OF TROPONIN QUANT: CPT

## 2021-04-05 PROCEDURE — 82948 REAGENT STRIP/BLOOD GLUCOSE: CPT

## 2021-04-05 PROCEDURE — 80053 COMPREHEN METABOLIC PANEL: CPT

## 2021-04-05 PROCEDURE — 6360000002 HC RX W HCPCS: Performed by: INTERNAL MEDICINE

## 2021-04-05 PROCEDURE — 94660 CPAP INITIATION&MGMT: CPT

## 2021-04-05 PROCEDURE — 2700000000 HC OXYGEN THERAPY PER DAY

## 2021-04-05 PROCEDURE — 82565 ASSAY OF CREATININE: CPT

## 2021-04-05 PROCEDURE — 96374 THER/PROPH/DIAG INJ IV PUSH: CPT

## 2021-04-05 PROCEDURE — 71275 CT ANGIOGRAPHY CHEST: CPT

## 2021-04-05 PROCEDURE — 93005 ELECTROCARDIOGRAM TRACING: CPT | Performed by: EMERGENCY MEDICINE

## 2021-04-05 PROCEDURE — 71045 X-RAY EXAM CHEST 1 VIEW: CPT

## 2021-04-05 PROCEDURE — 83605 ASSAY OF LACTIC ACID: CPT

## 2021-04-05 PROCEDURE — 31500 INSERT EMERGENCY AIRWAY: CPT

## 2021-04-05 PROCEDURE — 6370000000 HC RX 637 (ALT 250 FOR IP): Performed by: INTERNAL MEDICINE

## 2021-04-05 PROCEDURE — 83036 HEMOGLOBIN GLYCOSYLATED A1C: CPT

## 2021-04-05 PROCEDURE — 84145 PROCALCITONIN (PCT): CPT

## 2021-04-05 PROCEDURE — 93010 ELECTROCARDIOGRAM REPORT: CPT | Performed by: INTERNAL MEDICINE

## 2021-04-05 PROCEDURE — 85379 FIBRIN DEGRADATION QUANT: CPT

## 2021-04-05 PROCEDURE — 99285 EMERGENCY DEPT VISIT HI MDM: CPT

## 2021-04-05 PROCEDURE — 2500000003 HC RX 250 WO HCPCS: Performed by: INTERNAL MEDICINE

## 2021-04-05 PROCEDURE — 37799 UNLISTED PX VASCULAR SURGERY: CPT

## 2021-04-05 PROCEDURE — C9113 INJ PANTOPRAZOLE SODIUM, VIA: HCPCS | Performed by: INTERNAL MEDICINE

## 2021-04-05 PROCEDURE — 6360000004 HC RX CONTRAST MEDICATION: Performed by: EMERGENCY MEDICINE

## 2021-04-05 PROCEDURE — 6360000002 HC RX W HCPCS

## 2021-04-05 PROCEDURE — 36600 WITHDRAWAL OF ARTERIAL BLOOD: CPT

## 2021-04-05 PROCEDURE — 5A1945Z RESPIRATORY VENTILATION, 24-96 CONSECUTIVE HOURS: ICD-10-PCS | Performed by: INTERNAL MEDICINE

## 2021-04-05 PROCEDURE — 94761 N-INVAS EAR/PLS OXIMETRY MLT: CPT

## 2021-04-05 PROCEDURE — 2580000003 HC RX 258

## 2021-04-05 PROCEDURE — 86140 C-REACTIVE PROTEIN: CPT

## 2021-04-05 PROCEDURE — 87635 SARS-COV-2 COVID-19 AMP PRB: CPT

## 2021-04-05 PROCEDURE — XW033E5 INTRODUCTION OF REMDESIVIR ANTI-INFECTIVE INTO PERIPHERAL VEIN, PERCUTANEOUS APPROACH, NEW TECHNOLOGY GROUP 5: ICD-10-PCS | Performed by: INTERNAL MEDICINE

## 2021-04-05 PROCEDURE — 84295 ASSAY OF SERUM SODIUM: CPT

## 2021-04-05 PROCEDURE — 76937 US GUIDE VASCULAR ACCESS: CPT | Performed by: INTERNAL MEDICINE

## 2021-04-05 PROCEDURE — 94002 VENT MGMT INPAT INIT DAY: CPT

## 2021-04-05 PROCEDURE — 0BH17EZ INSERTION OF ENDOTRACHEAL AIRWAY INTO TRACHEA, VIA NATURAL OR ARTIFICIAL OPENING: ICD-10-PCS | Performed by: INTERNAL MEDICINE

## 2021-04-05 PROCEDURE — 99221 1ST HOSP IP/OBS SF/LOW 40: CPT | Performed by: INTERNAL MEDICINE

## 2021-04-05 PROCEDURE — 2500000003 HC RX 250 WO HCPCS

## 2021-04-05 PROCEDURE — 36556 INSERT NON-TUNNEL CV CATH: CPT

## 2021-04-05 PROCEDURE — 85025 COMPLETE CBC W/AUTO DIFF WBC: CPT

## 2021-04-05 PROCEDURE — 36556 INSERT NON-TUNNEL CV CATH: CPT | Performed by: INTERNAL MEDICINE

## 2021-04-05 PROCEDURE — 84132 ASSAY OF SERUM POTASSIUM: CPT

## 2021-04-05 PROCEDURE — 99291 CRITICAL CARE FIRST HOUR: CPT | Performed by: INTERNAL MEDICINE

## 2021-04-05 PROCEDURE — 36620 INSERTION CATHETER ARTERY: CPT | Performed by: INTERNAL MEDICINE

## 2021-04-05 PROCEDURE — 82435 ASSAY OF BLOOD CHLORIDE: CPT

## 2021-04-05 PROCEDURE — 6360000002 HC RX W HCPCS: Performed by: EMERGENCY MEDICINE

## 2021-04-05 PROCEDURE — 36415 COLL VENOUS BLD VENIPUNCTURE: CPT

## 2021-04-05 RX ORDER — LORAZEPAM 2 MG/ML
1 INJECTION INTRAMUSCULAR ONCE
Status: COMPLETED | OUTPATIENT
Start: 2021-04-05 | End: 2021-04-05

## 2021-04-05 RX ORDER — ETOMIDATE 2 MG/ML
INJECTION INTRAVENOUS
Status: COMPLETED
Start: 2021-04-05 | End: 2021-04-05

## 2021-04-05 RX ORDER — SODIUM CHLORIDE 9 MG/ML
INJECTION, SOLUTION INTRAVENOUS
Status: DISCONTINUED
Start: 2021-04-05 | End: 2021-04-05 | Stop reason: WASHOUT

## 2021-04-05 RX ORDER — PROPOFOL 10 MG/ML
5-50 INJECTION, EMULSION INTRAVENOUS
Status: DISCONTINUED | OUTPATIENT
Start: 2021-04-05 | End: 2021-04-05

## 2021-04-05 RX ORDER — ACETAMINOPHEN 325 MG/1
650 TABLET ORAL ONCE
Status: COMPLETED | OUTPATIENT
Start: 2021-04-06 | End: 2021-04-06

## 2021-04-05 RX ORDER — PANTOPRAZOLE SODIUM 40 MG/10ML
40 INJECTION, POWDER, LYOPHILIZED, FOR SOLUTION INTRAVENOUS DAILY
Status: DISCONTINUED | OUTPATIENT
Start: 2021-04-05 | End: 2021-04-07 | Stop reason: HOSPADM

## 2021-04-05 RX ORDER — GUAIFENESIN/DEXTROMETHORPHAN 100-10MG/5
5 SYRUP ORAL EVERY 4 HOURS PRN
Status: DISCONTINUED | OUTPATIENT
Start: 2021-04-05 | End: 2021-04-07 | Stop reason: HOSPADM

## 2021-04-05 RX ORDER — PROMETHAZINE HYDROCHLORIDE 12.5 MG/1
12.5 TABLET ORAL EVERY 6 HOURS PRN
Status: DISCONTINUED | OUTPATIENT
Start: 2021-04-05 | End: 2021-04-07 | Stop reason: HOSPADM

## 2021-04-05 RX ORDER — NICOTINE POLACRILEX 4 MG
15 LOZENGE BUCCAL PRN
Status: DISCONTINUED | OUTPATIENT
Start: 2021-04-05 | End: 2021-04-07 | Stop reason: HOSPADM

## 2021-04-05 RX ORDER — ACETAMINOPHEN 650 MG/1
650 SUPPOSITORY RECTAL EVERY 6 HOURS PRN
Status: DISCONTINUED | OUTPATIENT
Start: 2021-04-05 | End: 2021-04-06

## 2021-04-05 RX ORDER — LIDOCAINE HYDROCHLORIDE 10 MG/ML
5 INJECTION, SOLUTION EPIDURAL; INFILTRATION; INTRACAUDAL; PERINEURAL ONCE
Status: COMPLETED | OUTPATIENT
Start: 2021-04-05 | End: 2021-04-05

## 2021-04-05 RX ORDER — ACETAMINOPHEN 325 MG/1
650 TABLET ORAL EVERY 6 HOURS PRN
Status: DISCONTINUED | OUTPATIENT
Start: 2021-04-05 | End: 2021-04-06

## 2021-04-05 RX ORDER — 0.9 % SODIUM CHLORIDE 0.9 %
30 INTRAVENOUS SOLUTION INTRAVENOUS PRN
Status: DISCONTINUED | OUTPATIENT
Start: 2021-04-05 | End: 2021-04-07 | Stop reason: HOSPADM

## 2021-04-05 RX ORDER — DEXTROSE MONOHYDRATE 50 MG/ML
100 INJECTION, SOLUTION INTRAVENOUS PRN
Status: DISCONTINUED | OUTPATIENT
Start: 2021-04-05 | End: 2021-04-07 | Stop reason: HOSPADM

## 2021-04-05 RX ORDER — FENTANYL CITRATE 50 UG/ML
INJECTION, SOLUTION INTRAMUSCULAR; INTRAVENOUS
Status: COMPLETED
Start: 2021-04-05 | End: 2021-04-05

## 2021-04-05 RX ORDER — ONDANSETRON 2 MG/ML
4 INJECTION INTRAMUSCULAR; INTRAVENOUS EVERY 6 HOURS PRN
Status: DISCONTINUED | OUTPATIENT
Start: 2021-04-05 | End: 2021-04-07 | Stop reason: HOSPADM

## 2021-04-05 RX ORDER — CHLORHEXIDINE GLUCONATE 0.12 MG/ML
15 RINSE ORAL 2 TIMES DAILY
Status: DISCONTINUED | OUTPATIENT
Start: 2021-04-05 | End: 2021-04-07 | Stop reason: HOSPADM

## 2021-04-05 RX ORDER — PROPOFOL 10 MG/ML
5-50 INJECTION, EMULSION INTRAVENOUS
Status: DISCONTINUED | OUTPATIENT
Start: 2021-04-05 | End: 2021-04-07 | Stop reason: HOSPADM

## 2021-04-05 RX ORDER — SODIUM CHLORIDE 0.9 % (FLUSH) 0.9 %
10 SYRINGE (ML) INJECTION PRN
Status: DISCONTINUED | OUTPATIENT
Start: 2021-04-05 | End: 2021-04-07 | Stop reason: HOSPADM

## 2021-04-05 RX ORDER — SUCCINYLCHOLINE/SOD CL,ISO/PF 100 MG/5ML
250 SYRINGE (ML) INTRAVENOUS ONCE
Status: COMPLETED | OUTPATIENT
Start: 2021-04-05 | End: 2021-04-05

## 2021-04-05 RX ORDER — POLYETHYLENE GLYCOL 3350 17 G/17G
17 POWDER, FOR SOLUTION ORAL DAILY PRN
Status: DISCONTINUED | OUTPATIENT
Start: 2021-04-05 | End: 2021-04-06

## 2021-04-05 RX ORDER — DEXAMETHASONE SODIUM PHOSPHATE 4 MG/ML
6 INJECTION, SOLUTION INTRA-ARTICULAR; INTRALESIONAL; INTRAMUSCULAR; INTRAVENOUS; SOFT TISSUE EVERY 24 HOURS
Status: DISCONTINUED | OUTPATIENT
Start: 2021-04-05 | End: 2021-04-07 | Stop reason: HOSPADM

## 2021-04-05 RX ORDER — SODIUM CHLORIDE 0.9 % (FLUSH) 0.9 %
10 SYRINGE (ML) INJECTION EVERY 12 HOURS SCHEDULED
Status: DISCONTINUED | OUTPATIENT
Start: 2021-04-05 | End: 2021-04-07 | Stop reason: HOSPADM

## 2021-04-05 RX ORDER — LEVOFLOXACIN 5 MG/ML
750 INJECTION, SOLUTION INTRAVENOUS EVERY 24 HOURS
Status: DISCONTINUED | OUTPATIENT
Start: 2021-04-05 | End: 2021-04-06

## 2021-04-05 RX ORDER — FENTANYL CITRATE 50 UG/ML
50 INJECTION, SOLUTION INTRAMUSCULAR; INTRAVENOUS ONCE
Status: COMPLETED | OUTPATIENT
Start: 2021-04-05 | End: 2021-04-05

## 2021-04-05 RX ORDER — SODIUM CHLORIDE 9 MG/ML
10 INJECTION INTRAVENOUS DAILY
Status: DISCONTINUED | OUTPATIENT
Start: 2021-04-05 | End: 2021-04-07 | Stop reason: HOSPADM

## 2021-04-05 RX ORDER — ETOMIDATE 2 MG/ML
60 INJECTION INTRAVENOUS ONCE
Status: COMPLETED | OUTPATIENT
Start: 2021-04-05 | End: 2021-04-05

## 2021-04-05 RX ORDER — DEXTROSE MONOHYDRATE 25 G/50ML
12.5 INJECTION, SOLUTION INTRAVENOUS PRN
Status: DISCONTINUED | OUTPATIENT
Start: 2021-04-05 | End: 2021-04-07 | Stop reason: HOSPADM

## 2021-04-05 RX ORDER — PROPOFOL 10 MG/ML
INJECTION, EMULSION INTRAVENOUS
Status: COMPLETED
Start: 2021-04-05 | End: 2021-04-05

## 2021-04-05 RX ORDER — MIDAZOLAM HYDROCHLORIDE 1 MG/ML
INJECTION INTRAMUSCULAR; INTRAVENOUS
Status: COMPLETED
Start: 2021-04-05 | End: 2021-04-05

## 2021-04-05 RX ORDER — SODIUM CHLORIDE 9 MG/ML
25 INJECTION, SOLUTION INTRAVENOUS PRN
Status: DISCONTINUED | OUTPATIENT
Start: 2021-04-05 | End: 2021-04-07 | Stop reason: HOSPADM

## 2021-04-05 RX ORDER — MIDAZOLAM HYDROCHLORIDE 2 MG/2ML
6 INJECTION, SOLUTION INTRAMUSCULAR; INTRAVENOUS ONCE
Status: COMPLETED | OUTPATIENT
Start: 2021-04-05 | End: 2021-04-05

## 2021-04-05 RX ORDER — MAGNESIUM HYDROXIDE 1200 MG/15ML
LIQUID ORAL
Status: DISPENSED
Start: 2021-04-05 | End: 2021-04-05

## 2021-04-05 RX ADMIN — SODIUM CHLORIDE, PRESERVATIVE FREE 10 ML: 5 INJECTION INTRAVENOUS at 10:20

## 2021-04-05 RX ADMIN — SODIUM CHLORIDE, PRESERVATIVE FREE 10 ML: 5 INJECTION INTRAVENOUS at 09:39

## 2021-04-05 RX ADMIN — SODIUM CHLORIDE 0.5 UNITS/HR: 9 INJECTION, SOLUTION INTRAVENOUS at 10:36

## 2021-04-05 RX ADMIN — SODIUM CHLORIDE 1 MCG/KG/HR: 9 INJECTION, SOLUTION INTRAVENOUS at 20:40

## 2021-04-05 RX ADMIN — Medication 8 MCG/MIN: at 22:20

## 2021-04-05 RX ADMIN — PANTOPRAZOLE SODIUM 40 MG: 40 INJECTION, POWDER, FOR SOLUTION INTRAVENOUS at 10:41

## 2021-04-05 RX ADMIN — PROPOFOL 30 MCG/KG/MIN: 10 INJECTION, EMULSION INTRAVENOUS at 12:16

## 2021-04-05 RX ADMIN — LEVOFLOXACIN 750 MG: 5 INJECTION, SOLUTION INTRAVENOUS at 16:16

## 2021-04-05 RX ADMIN — SODIUM CHLORIDE 1 MCG/KG/HR: 9 INJECTION, SOLUTION INTRAVENOUS at 13:33

## 2021-04-05 RX ADMIN — LORAZEPAM 1 MG: 2 INJECTION INTRAMUSCULAR; INTRAVENOUS at 01:13

## 2021-04-05 RX ADMIN — PROPOFOL 20 MCG/KG/MIN: 10 INJECTION, EMULSION INTRAVENOUS at 17:04

## 2021-04-05 RX ADMIN — ACETAMINOPHEN 650 MG: 325 TABLET ORAL at 09:57

## 2021-04-05 RX ADMIN — SODIUM CHLORIDE 1.4 MCG/KG/HR: 9 INJECTION, SOLUTION INTRAVENOUS at 08:45

## 2021-04-05 RX ADMIN — IOPAMIDOL 100 ML: 612 INJECTION, SOLUTION INTRAVENOUS at 01:41

## 2021-04-05 RX ADMIN — MEROPENEM 1000 MG: 1 INJECTION, POWDER, FOR SOLUTION INTRAVENOUS at 15:43

## 2021-04-05 RX ADMIN — ACETAMINOPHEN 650 MG: 325 TABLET ORAL at 20:23

## 2021-04-05 RX ADMIN — REMDESIVIR 200 MG: 100 INJECTION, POWDER, LYOPHILIZED, FOR SOLUTION INTRAVENOUS at 09:05

## 2021-04-05 RX ADMIN — SODIUM CHLORIDE: 9 INJECTION, SOLUTION INTRAVENOUS at 08:50

## 2021-04-05 RX ADMIN — DEXAMETHASONE SODIUM PHOSPHATE 6 MG: 4 INJECTION, SOLUTION INTRA-ARTICULAR; INTRALESIONAL; INTRAMUSCULAR; INTRAVENOUS; SOFT TISSUE at 05:32

## 2021-04-05 RX ADMIN — MEROPENEM 1000 MG: 1 INJECTION, POWDER, FOR SOLUTION INTRAVENOUS at 23:25

## 2021-04-05 RX ADMIN — CHLORHEXIDINE GLUCONATE 15 ML: 1.2 RINSE ORAL at 20:41

## 2021-04-05 RX ADMIN — ETOMIDATE 60 MG: 2 INJECTION, SOLUTION INTRAVENOUS at 08:30

## 2021-04-05 RX ADMIN — PROPOFOL 25 MCG/KG/MIN: 10 INJECTION, EMULSION INTRAVENOUS at 14:00

## 2021-04-05 RX ADMIN — TOCILIZUMAB 400 MG: 20 INJECTION, SOLUTION, CONCENTRATE INTRAVENOUS at 09:05

## 2021-04-05 RX ADMIN — SODIUM CHLORIDE 1 MCG/KG/HR: 9 INJECTION, SOLUTION INTRAVENOUS at 16:15

## 2021-04-05 RX ADMIN — ETOMIDATE 20 MG: 2 INJECTION, SOLUTION INTRAVENOUS at 08:30

## 2021-04-05 RX ADMIN — LIDOCAINE HYDROCHLORIDE 5 ML: 10 INJECTION, SOLUTION EPIDURAL; INFILTRATION; INTRACAUDAL; PERINEURAL at 08:25

## 2021-04-05 RX ADMIN — FENTANYL CITRATE 100 MCG/HR: 50 INJECTION, SOLUTION INTRAMUSCULAR; INTRAVENOUS at 09:00

## 2021-04-05 RX ADMIN — MIDAZOLAM HYDROCHLORIDE 6 MG: 1 INJECTION, SOLUTION INTRAMUSCULAR; INTRAVENOUS at 08:25

## 2021-04-05 RX ADMIN — ETOMIDATE 60 MG: 2 INJECTION INTRAVENOUS at 08:30

## 2021-04-05 RX ADMIN — SODIUM CHLORIDE 1 MCG/KG/HR: 9 INJECTION, SOLUTION INTRAVENOUS at 12:15

## 2021-04-05 RX ADMIN — SODIUM CHLORIDE 1 MCG/KG/HR: 9 INJECTION, SOLUTION INTRAVENOUS at 14:35

## 2021-04-05 RX ADMIN — ENOXAPARIN SODIUM 40 MG: 40 INJECTION SUBCUTANEOUS at 10:07

## 2021-04-05 RX ADMIN — PROPOFOL 30 MCG/KG/MIN: 10 INJECTION, EMULSION INTRAVENOUS at 10:48

## 2021-04-05 RX ADMIN — SODIUM CHLORIDE 1 MCG/KG/HR: 9 INJECTION, SOLUTION INTRAVENOUS at 22:20

## 2021-04-05 RX ADMIN — CHLORHEXIDINE GLUCONATE 15 ML: 1.2 RINSE ORAL at 10:18

## 2021-04-05 RX ADMIN — SODIUM CHLORIDE 20 UNITS/HR: 9 INJECTION, SOLUTION INTRAVENOUS at 16:21

## 2021-04-05 RX ADMIN — PROPOFOL 30 MCG/KG/MIN: 10 INJECTION, EMULSION INTRAVENOUS at 08:30

## 2021-04-05 RX ADMIN — Medication 5 MCG/MIN: at 20:41

## 2021-04-05 RX ADMIN — SODIUM CHLORIDE 1 MCG/KG/HR: 9 INJECTION, SOLUTION INTRAVENOUS at 18:44

## 2021-04-05 RX ADMIN — SODIUM CHLORIDE 12 UNITS/HR: 9 INJECTION, SOLUTION INTRAVENOUS at 20:00

## 2021-04-05 RX ADMIN — TOCILIZUMAB 400 MG: 20 INJECTION, SOLUTION, CONCENTRATE INTRAVENOUS at 10:14

## 2021-04-05 RX ADMIN — SODIUM CHLORIDE 1 MCG/KG/HR: 9 INJECTION, SOLUTION INTRAVENOUS at 06:50

## 2021-04-05 RX ADMIN — SODIUM CHLORIDE, PRESERVATIVE FREE 10 ML: 5 INJECTION INTRAVENOUS at 20:42

## 2021-04-05 RX ADMIN — LORAZEPAM 1 MG: 2 INJECTION INTRAMUSCULAR; INTRAVENOUS at 06:09

## 2021-04-05 RX ADMIN — FENTANYL CITRATE 50 MCG: 50 INJECTION, SOLUTION INTRAMUSCULAR; INTRAVENOUS at 08:20

## 2021-04-05 RX ADMIN — SODIUM CHLORIDE 8 UNITS/HR: 9 INJECTION, SOLUTION INTRAVENOUS at 21:09

## 2021-04-05 RX ADMIN — SODIUM CHLORIDE 1 MCG/KG/HR: 9 INJECTION, SOLUTION INTRAVENOUS at 10:47

## 2021-04-05 RX ADMIN — Medication 250 MG: at 08:30

## 2021-04-05 RX ADMIN — Medication 2 MCG/MIN: at 10:41

## 2021-04-05 RX ADMIN — ENOXAPARIN SODIUM 40 MG: 40 INJECTION SUBCUTANEOUS at 20:44

## 2021-04-05 RX ADMIN — MIDAZOLAM HYDROCHLORIDE 6 MG: 2 INJECTION, SOLUTION INTRAMUSCULAR; INTRAVENOUS at 08:25

## 2021-04-05 ASSESSMENT — PULMONARY FUNCTION TESTS
PIF_VALUE: 43
PIF_VALUE: 45
PIF_VALUE: 45
PIF_VALUE: 38
PIF_VALUE: 42
PIF_VALUE: 45
PIF_VALUE: 40
PIF_VALUE: 13
PIF_VALUE: 14
PIF_VALUE: 39
PIF_VALUE: 42
PIF_VALUE: 40
PIF_VALUE: 45
PIF_VALUE: 39
PIF_VALUE: 41
PIF_VALUE: 45
PIF_VALUE: 42
PIF_VALUE: 43
PIF_VALUE: 43
PIF_VALUE: 44
PIF_VALUE: 42
PIF_VALUE: 43
PIF_VALUE: 45

## 2021-04-05 ASSESSMENT — PAIN SCALES - GENERAL
PAINLEVEL_OUTOF10: 0
PAINLEVEL_OUTOF10: 1
PAINLEVEL_OUTOF10: 0

## 2021-04-05 ASSESSMENT — ENCOUNTER SYMPTOMS: SHORTNESS OF BREATH: 1

## 2021-04-05 NOTE — PLAN OF CARE
Nutrition Problem #1: Inadequate oral intake  Intervention: Food and/or Nutrient Delivery: (Monitor for ability to start TF)  Nutritional Goals: initiation of TF

## 2021-04-05 NOTE — ED NOTES
Patient is sitting at bedside. Continues to have tachypnea and is anxious. Patient states that he is afraid to fall asleep because he is afraid that he will stop breathing in his sleep. Calming and reassurance given.        Jai Duran RN  04/05/21 609 [address] Lynne Hidalgo RN  04/05/21 1285

## 2021-04-05 NOTE — H&P
151/107   Pulse 128   Temp 100.8 °F (38.2 °C) (Oral)   Resp (!) 52   Ht 6' 8\" (2.032 m)   Wt (!) 450 lb (204.1 kg)   SpO2 100%   BMI 49.44 kg/m²   General Appearance: alert and oriented to person, place and time, well developed and well- nourished, in no acute distress  Skin: warm and dry, no rash or erythema  Head: normocephalic and atraumatic  Eyes: pupils equal, round, and reactive to light, extraocular eye movements intact, conjunctivae normal  ENT: tympanic membrane, external ear and ear canal normal bilaterally, nose without deformity, nasal mucosa and turbinates normal without polyps  Neck: supple and non-tender without mass, no thyromegaly or thyroid nodules, no cervical lymphadenopathy  Pulmonary/Chest: clear to auscultation bilaterally- no wheezes, rales or rhonchi, normal air movement, no respiratory distress  Cardiovascular: normal rate, regular rhythm, normal S1 and S2, no murmurs, rubs, clicks, or gallops, distal pulses intact, no carotid bruits  Abdomen: soft, non-tender, non-distended, normal bowel sounds, no masses or organomegaly  Extremities: no cyanosis, clubbing or edema  Musculoskeletal: normal range of motion, no joint swelling, deformity or tenderness  Neurologic: reflexes normal and symmetric, no cranial nerve deficit, gait, coordination and speech normal      LABS:  Recent Labs     04/05/21 0030 04/05/21 0046   *  --    K 3.9  --    CL 91*  --    CO2 23  --    BUN 12  --    CREATININE 0.65* 0.5*   GLUCOSE 306*  --    CALCIUM 8.7  --        Recent Labs     04/05/21  0030 04/05/21  0046 04/05/21  0539   WBC 6.4  --  7.2   RBC 5.12  --  5.49   HGB 13.6* 13.6 14.6   HCT 40.9*  --  44.0   MCV 80.0  --  80.2   MCH 26.6*  --  26.7*   MCHC 33.3  --  33.3   RDW 15.7*  --  15.8*     --  215       Recent Labs     04/05/21  0046   POCGLU 321*       CBC with Differential:    Lab Results   Component Value Date    WBC 7.2 04/05/2021    RBC 5.49 04/05/2021    HGB 14.6 04/05/2021 glucose on presentation over 300   A1c 11.3   Insulin sliding scale for now   Endocrinology consult   Start long-acting  4. Hypertension   Likely due to anxiety   Will give a dose of Ativan and monitor closely   Labetalol as needed    Code Status: Full  DVT prophylaxis: Lovenox        Electronically signed by Liberty Simmons MD on 4/5/2021 at 6:13 AM      NOTE: This report was transcribed using voice recognition software. Every effort was made to ensure accuracy; however, inadvertent computerized transcription errors may be present.

## 2021-04-05 NOTE — PROGRESS NOTES
Order for Remdesivir received from Dr. Liliam Gonzalez    1)  Confirmed drug availability for complete patient course of therapy (200 mg IV x 1, then 100 mg daily on days 2-5)    2)  Inclusion Criteria:  Reviewed/Confirmed with provider criteria present   Adults, children (?3.5Kg, only use lyophilized powder), or pregnant women with proven COVID19 as defined by a positive nasopharyngeal swab, tracheal aspirate or sputum   SARS-CoV-2 PCR or a positive serology test requiring hospitalization for COVID-19-severe pneumonia.    Requiring supplemental oxygen     Contains abnormal data COVID-19, Rapid  Order: [id number]  Status:  Final result   Visible to patient:  No (not released) Next appt:  None  Specimen Information: Nasopharyngeal Swab         Ref Range & Units 4/5/21 0054             3)  Recommend prioritization of patients who present with symptom onset < 14 days and within 10 days of acute care admission     4)  Exclusion Criteria:  Reviewed/Confirmed none of the following  present: (criteria in bold present during review; risk/benefit rationale of physician documented)    Requiring invasive or non-invasive mechanical ventilation  A) Consider use in patients requiring high-flow oxygen early in the disease state (based on symptom duration)    Use of more than 1 vasopressor agent prior to start of remdesivir    Already improving on current treatment/supportive regimen as evidenced by improving oxygenation, and/or impending discharge    Patients in whom the clinical team think death is in the immediate short-term whereby administration of RDV unlikely to change clinical outcome     5) Monitoring Parameters:  CMP (includes hepatic panel) x 5 days    Consider discontinuation of remdesivir if LFTs substantially increase following initiation of therapy (ie: 5x baseline lab values) or if ALT elevation is accompanied by signs or symptoms of liver inflammation    GFR < 30mL/min: Use with caution due to risk of cyclodextrin toxicity with IV solution as it accumulates in reduced renal clearance       Recent Labs     04/05/21  0057 04/05/21  0539   CREATININE 0.6* 0.65*   Estimated Creatinine Clearance: 321 mL/min (A) (based on SCr of 0.65 mg/dL (L)). Recent Labs     04/05/21  0539   ALT 53*   AST 80*         Telephone from dr. Lata Collazo monitor CMP daily.    Hanh SlaughterD

## 2021-04-05 NOTE — ED NOTES
Patient resting on cart with eyes closed. Continues with tachypnea.   Bi-pap @ 14/7, 50% Fio2     Odalys Randhawa, RN  04/05/21 2783

## 2021-04-05 NOTE — CARE COORDINATION
[facility] [place] Case Management Initial Discharge Assessment    Met with Family to discuss discharge plan. PCP:            DOES NOT HAVE A PCP      OR INSURANCE            If no PCP, list provided? Yes    Discharge Planning    Living Arrangements: independently at home    Who do you live with? ALONE    Who helps you with your care:  self or family    If lives at home:     Do you have any barriers navigating in your home? no    Patient can perform ADL? Yes    Current Services (outpatient and in home) :  None    Dialysis: No    Is transportation available to get to your appointments? Yes    DME Equipment:  no    Respiratory equipment: None    Respiratory provider:  no     Pharmacy:  yes [facility]    Consult with Medication Assistance Program?  No      Patient agreeable to [address]? UNSURE OF NEEDS S/P EXTUBATION    Patient agreeable to SNF/Rehab? UNSURE OF NEEDS S/P EXTUBATION    Other discharge needs identified? MONITOR FOR THERAPY/HOME OXYGEN NEEDS S/P EXTUBATION--COVID +    Freedom of choice list provided with basic dialogue that supports the patient's individualized plan of care/goals and shares the quality data associated with the providers. Yes    Does Patient Have a High-Risk for Readmission Diagnosis (CHF, PN, MI, COPD)? No    The plan for Transition of Care is related to the following treatment goals: GET OFF THE VENT--BREATH BETTER S/P +COVID     Initial Discharge Plan? (Note: please see concurrent daily documentation for any updates after initial note). RETURN HOME. MONITOR FOR HOME GOING O2 AND THERAPY NEEDS. PATIENT WAS INDEPENDENT PRIOR TO ADMISSION.      The Patient and/or patient representative: PT/FAMILY provided with choice of any post-acute providers for care and equipment and agrees with discharge plan  Yes    Electronically signed by Junaid Alvarez RN on 4/5/2021 at 3:58 PM

## 2021-04-05 NOTE — PROGRESS NOTES
Comprehensive Nutrition Assessment    Type and Reason for Visit:  Initial(Mechanical ventilation)    Nutrition Recommendations/Plan: Monitor for ability to start TF    Nutrition Assessment:  Pt admitted with Acute Respiratory failure with hypoxis, +COVID-19 infection. Pt appears adequately nourished on admission, pt is currently intubated and sedated. Pt is at nutrition risk due to inability to take po, unable to start TF as pt is being ruled out for CDiff. Will monitor ability to start TF    Malnutrition Assessment:  Malnutrition Status:  Insufficient data    Context:  Acute Illness     Findings of the 6 clinical characteristics of malnutrition:  Energy Intake:  Unable to assess  Weight Loss:  Unable to assess     Body Fat Loss:  No significant body fat loss     Muscle Mass Loss:  No significant muscle mass loss    Fluid Accumulation:  Unable to assess     Strength:  Not Performed    Estimated Daily Nutrient Needs:  Energy (kcal):  0605-6554 (kg IBW x 10-11); Weight Used for Energy Requirements:  Ideal(103 kg)     Protein (g):  257 gm (kg IBW x 2.5); Weight Used for Protein Requirements:  Ideal(103 kg)        Fluid (ml/day):  ~1100 ml; Method Used for Fluid Requirements:  1 ml/kcal      Nutrition Related Findings:  intubated (4/5, CDiff r/o, propofol @ 36.7 (968 kcal), Labs reviewed, BUN/Cr WNL, Gluc 438, Lactate 3.34, BM pta, 1+ BUE/BLE edema noted, CVC/Peripheral lines. No hx on file      Wounds:  None       Current Nutrition Therapies:    DIET GENERAL;     Anthropometric Measures:  · Height: 6' 8\" (203.2 cm)  · Current Body Weight: 450 lb (204.1 kg)(stated (adm))   · Admission Body Weight: 450 lb (204.1 kg)(stated)    · Usual Body Weight: 450 lb (204.1 kg)(5/9/20-stated)     · Ideal Body Weight: 226 lbs; % Ideal Body Weight  > 100%  · BMI: 49.4  · BMI Categories:  Obese Class 3 (BMI 40 or greater)       Nutrition Diagnosis:   · Inadequate oral intake related to impaired respiratory function as evidenced by intubation    Nutrition Interventions:   Food and/or Nutrient Delivery:  (Monitor for ability to start TF)  Nutrition Education/Counseling:  No recommendation at this time   Coordination of Nutrition Care:  Continue to monitor while inpatient    Goals:  initiation of TF       Nutrition Monitoring and Evaluation:   Food/Nutrient Intake Outcomes:   Other (Comment)(Ability to start TF)  Physical Signs/Symptoms Outcomes:   Biochemical Data, Diarrhea, Fluid Status or Edema, Weight    Electronically signed by Mariangel Yun RD, LD on 4/5/21 at 10:42 AM EDT

## 2021-04-05 NOTE — FLOWSHEET NOTE
0430- pt to ICU from ED. Report from [address]- assessment complete. AOx4, bilateral lung sounds diminished & rhonchi, Tachypneic, tachycardic, BU&LE +1 pitting edema. Dr. Darshan Kothari to see pt ordered ativan. 0630- pt still anxious increased HR, BP, & RR. Precedex ordered.

## 2021-04-05 NOTE — ED NOTES
Patient given pillow and lights off in room for comfort. Will continue to monitor tolerance for Bi-pap.      Odalys Randhawa RN  04/05/21 1849

## 2021-04-05 NOTE — ED NOTES
Patient is very anxious. Patient reminders given to try to slow breathing ineffective. Dr. Heavenly Martinez informed. SNO for Ativan IV.      Ramila Desai RN  04/05/21 3342

## 2021-04-05 NOTE — CONSULTS
Infectious Disease     Patient Name: Estelita Bumpers  Date: 4/5/2021  YOB: 1990  Medical Record Number: [id number]        covid 23 pneumonia  Atypical community-acquired pneumonia        History of Present Illness: Morbidly obese      Patient presents with shortness of breath tested positive for COVID-19 approximately week prior to admission progressive nausea vomiting fevers diarrhea cough worsening shortness of breath dyspnea on exertion over the past 2 to 3 days    Patient was initially on BiPAP 100% breathing up to 40 times a minute which required him to be intubated  Fevers    Procalcitonin and CRP are elevated    Patient apparently had tested positive for COVID-19 in January 2021       D-Dimer, Quantitative [[id number]] (Abnormal) Collected: 04/05/21 0539     Specimen: Blood Updated: 04/05/21 0738      D-Dimer, Quant 1.66 mg/L FEU      Procalcitonin [[id number]] (Abnormal) Collected: 04/05/21 0539      Specimen: Blood Updated: 04/05/21 0645      Procalcitonin 0.24 ng/mL      C-Reactive Protein [[id number]] (Abnormal) Collected: 04/05/21 0539     Specimen: Blood Updated: 04/05/21 0643      .8 mg/L            Review of Systems   Unable to perform ROS: Intubated       Review of Systems: All 14 review of systems negative other than as stated above    Social History     Tobacco Use    Smoking status: Never Smoker    Smokeless tobacco: Never Used   Substance Use Topics    Alcohol use: No    Drug use: No         History reviewed. No pertinent past medical history. History reviewed. No pertinent surgical history. No current facility-administered medications on file prior to encounter.       Current Outpatient Medications on File Prior to Encounter   Medication Sig Dispense Refill    brompheniramine-pseudoephedrine-DM 2-30-10 MG/5ML syrup Take 5 mLs by mouth 4 times daily as needed for Congestion or Cough 180 mL 0    albuterol sulfate HFA (VENTOLIN HFA) 108 (90 Base) MCG/ACT inhaler 100 mL of Isovue-300 intravenous contrast.  Images were obtained with bolus tracking in order to opacify the pulmonary arteries.  Thick section coronal MIP 3D reconstructions were performed    on a separate workstation.       COMPARISON:none       FINDINGS:         Study limited secondary to suboptimal bolus administration.       Pulmonary arteries: No intraluminal filling defects, main, right, and left pulmonary arteries. Bilateral interlobar, segmental, and subsegmental branches cannot be assessed secondary to suboptimal bolus administration. Transverse diameter, main pulmonary    artery 4.0 cm at level pulmonary chill bifurcation.       Thoracic aorta: Normal in course and caliber. Transverse diameter, thoracic aorta, 3.2 cm, at level of pulmonary arterial bifurcation.       Cardiac Size: Normal.       Pericardial effusion: None.       Right lung: No nodules, masses, pleural effusion, pneumothorax. Diffuse, peripheral, groundglass opacities, right lung.           Left lung: No nodules, masses, pleural effusion, pneumothorax. Diffuse, peripheral, groundglass opacities, left lung.       Lymph nodes: No hilar, mediastinal, or axillary lymph node enlargement.       Upper abdomen:Limited imaging upper abdomen shows reflux of contrast medium into inferior vena cava and hepatic veins.       Musculoskeletal:No osteoblastic, and no osteolytic lesions.       I did not see the       Impression       Limited study as discussed.       Marked diffuse bilateral groundglass opacities. These findings may be seen in patients with covid 19 pneumonia. However, other infectious and inflammatory etiologies may cause similar radiographic appearance.       No CT evidence pulmonary embolism main, right, and left pulmonary arteries.  Bilateral interlobar, segmental, and subsegmental branches cannot be assessed secondary to suboptimal bolus administration.       Enlarged main pulmonary artery as discussed, suggestive of pulmonary arterial hypertension.       Reflux of contrast medium into hepatic veins, suggestive of right heart dysfunction/failure.          ASSESSMENT:  Patient Active Problem List   Diagnosis    Acute respiratory failure with hypoxia (HCC)         PLAN:    covid 19 pneumonia  Atypical community-acquired pneumonia    Started on remdesivir given a dose of Actemra  On dexamethasone  Intubated    The question is this worsening of initial COVID-19 infection reinfection with COVID-19 progressive disease with superimposed bacterial infection given elevated procalcitonin    Agree with steroids remdesivir and Actemra  Place patient on Levaquin meropenem combination for atypical community-acquired pneumonia

## 2021-04-05 NOTE — PROGRESS NOTES
Department of Internal Medicine  General Internal Medicine  Attending Progress Note      SUBJECTIVE:  Pt seen through ICU door to reduce risk of exposure to COVID-19. Pt intubated this AM due to worsening respiratory status. Started on Remdesevir.     OBJECTIVE      Medications    Current Facility-Administered Medications: sodium chloride flush 0.9 % injection 10 mL, 10 mL, Intravenous, 2 times per day  sodium chloride flush 0.9 % injection 10 mL, 10 mL, Intravenous, PRN  0.9 % sodium chloride infusion, 25 mL, Intravenous, PRN  promethazine (PHENERGAN) tablet 12.5 mg, 12.5 mg, Oral, Q6H PRN **OR** ondansetron (ZOFRAN) injection 4 mg, 4 mg, Intravenous, Q6H PRN  polyethylene glycol (GLYCOLAX) packet 17 g, 17 g, Oral, Daily PRN  acetaminophen (TYLENOL) tablet 650 mg, 650 mg, Oral, Q6H PRN **OR** acetaminophen (TYLENOL) suppository 650 mg, 650 mg, Rectal, Q6H PRN  enoxaparin (LOVENOX) injection 40 mg, 40 mg, Subcutaneous, BID  guaiFENesin-dextromethorphan (ROBITUSSIN DM) 100-10 MG/5ML syrup 5 mL, 5 mL, Oral, Q4H PRN  dexamethasone (DECADRON) injection 6 mg, 6 mg, Intravenous, Q24H  glucose (GLUTOSE) 40 % oral gel 15 g, 15 g, Oral, PRN  dextrose 50 % IV solution, 12.5 g, Intravenous, PRN  glucagon (rDNA) injection 1 mg, 1 mg, Intramuscular, PRN  dextrose 5 % solution, 100 mL/hr, Intravenous, PRN  sodium chloride 0.9 % irrigation, , ,   [COMPLETED] remdesivir 200 mg in sodium chloride 0.9 % 250 mL IVPB, 200 mg, Intravenous, Once **FOLLOWED BY** [START ON 4/6/2021] remdesivir 100 mg in sodium chloride 0.9 % 250 mL IVPB, 100 mg, Intravenous, Q24H  0.9 % sodium chloride bolus, 30 mL, Intravenous, PRN  fentaNYL (SUBLIMAZE) 1,000 mcg in sodium chloride 0.9 % 100 mL infusion, 12.5-200 mcg/hr, Intravenous, Continuous  insulin regular (HUMULIN R;NOVOLIN R) 100 Units in sodium chloride 0.9 % 100 mL infusion, 0.5 Units/hr, Intravenous, Continuous  pantoprazole (PROTONIX) injection 40 mg, 40 mg, Intravenous, Daily **AND** sodium chloride (PF) 0.9 % injection 10 mL, 10 mL, Intravenous, Daily  chlorhexidine (PERIDEX) 0.12 % solution 15 mL, 15 mL, Mouth/Throat, BID  norepinephrine (LEVOPHED) 16 mg in dextrose 5% 250 mL infusion, 2-100 mcg/min, Intravenous, Continuous  dexmedetomidine (PRECEDEX) 400 mcg in sodium chloride 0.9 % 100 mL infusion, 0.2-1.4 mcg/kg/hr, Intravenous, Continuous  propofol injection, 5-50 mcg/kg/min, Intravenous, Titrated  levoFLOXacin (LEVAQUIN) 750 MG/150ML infusion 750 mg, 750 mg, Intravenous, Q24H  meropenem (MERREM) 1,000 mg in sodium chloride 0.9 % 100 mL IVPB (mini-bag), 1,000 mg, Intravenous, Q8H  Physical    VITALS:  /72   Pulse 87   Temp 100.8 °F (38.2 °C) (Oral)   Resp 30   Ht 6' 8\" (2.032 m)   Wt (!) 596 lb 1.6 oz (270.4 kg)   SpO2 99%   BMI 65.49 kg/m²   Constitutional: intubated, sedated  Head: Normocephalic, atraumatic  ENT: moist mucous membranes  Neck: neck supple, trachea midline  Lungs: non labored  Heart: RRR on tele  GI: non-distended  MSK: no edema noted       Data    CBC:   Lab Results   Component Value Date    WBC 7.2 04/05/2021    RBC 5.49 04/05/2021    HGB 15.2 04/05/2021    HCT 44.0 04/05/2021    MCV 80.2 04/05/2021    MCH 26.7 04/05/2021    MCHC 33.3 04/05/2021    RDW 15.8 04/05/2021     04/05/2021     CMP:    Lab Results   Component Value Date     04/05/2021    K 4.1 04/05/2021    CL 93 04/05/2021    CO2 22 04/05/2021    BUN 12 04/05/2021    CREATININE 1.0 04/05/2021    CREATININE 0.65 04/05/2021    GFRAA >60 04/05/2021    LABGLOM >60 04/05/2021    GLUCOSE 258 04/05/2021    PROT 7.5 04/05/2021    LABALBU 3.6 04/05/2021    CALCIUM 8.9 04/05/2021    BILITOT 0.8 04/05/2021    ALKPHOS 152 04/05/2021    AST 80 04/05/2021    ALT 53 04/05/2021       ASSESSMENT AND PLAN      # Acute hypoxic respiratory failure 2/2 COVID-19 pneumonia  - tested positive on 1/17/21.  Now with worsening symptoms and positive test on arrival - re-infection?  - started on remdesivir, decadron  - ID and pulm consulted  - intubated 4/5 due to worsening respiratory status  - CTA negative for PE, but with bilateral groundglass  - vent management per intensivist  - cont empiric meropenem, levaquin per ID recs    # New onset diabetes  - A1C 11.2  - endocrinology consulted  - ISS, lantus    # HTN  - continue to monitor  - prn meds as needed    DVT: lovenox per COVID protocol    Disposition: Pt with worsening respiratory status and intubated this AM. Pulm and ID consulted.        Coy Hill DO  Internal Medicine

## 2021-04-05 NOTE — CONSULTS
Pulmonary and Critical Care Medicine  Consult Note  Encounter Date: 2021 10:29 AM    Mr. Diogenes Duffy is a 27 y.o. male  : 1990  Requesting Provider: Jalen Stearns DO    Reason for request: Acute respiratory failure            HISTORY OF PRESENT ILLNESS:    Patient is 27 y.o. presents with acute hypoxic respiratory failure, when I saw him he was on BiPAP 100%, breathing 40/min, and able to speak in full sentences, and in severe respiratory distress, he was intubated urgently to avoid acute respiratory arrest.  Patient was diagnosed with Covid infection 1 week ago, he has been progressively getting worse, with more shortness of breath Per notes from ED he did not have chest pain, no fever or coughing, no reported nausea vomiting or diarrhea. Past Medical History:    History reviewed. No pertinent past medical history. Diabetes  Past Surgical History:    History reviewed. No pertinent surgical history. Social History:     reports that he has never smoked. He has never used smokeless tobacco. He reports that he does not drink alcohol or use drugs. Family History:   History reviewed. No pertinent family history. No family history of lung disease  Allergies:  Patient has no known allergies.         MEDICATIONS during current hospitalization:    Continuous Infusions:   sodium chloride      dextrose      dexmedetomidine 1 mcg/kg/hr (21 0954)    sodium chloride      propofol 30 mcg/kg/min (21 0900)    fentaNYL (SUBLIMAZE) infusion 50 mcg/hr (21 0930)    insulin      norepinephrine         Scheduled Meds:   sodium chloride flush  10 mL Intravenous 2 times per day    enoxaparin  40 mg Subcutaneous BID    dexamethasone  6 mg Intravenous Q24H    [START ON 2021] remdesivir IVPB  100 mg Intravenous Q24H    pantoprazole  40 mg Intravenous Daily    And    sodium chloride (PF)  10 mL Intravenous Daily    chlorhexidine  15 mL Mouth/Throat BID    tocilizumab (ACTEMRA) IVPB  400 mg Intravenous Once       PRN Meds:sodium chloride flush, sodium chloride, promethazine **OR** ondansetron, polyethylene glycol, acetaminophen **OR** acetaminophen, guaiFENesin-dextromethorphan, glucose, dextrose, glucagon (rDNA), dextrose, sodium chloride        REVIEW OF SYSTEMS: It was difficult to obtain tachypneic and on Precedex drip however done to best ability. ROS: 10 organs review of system is done including general, psychological, ENT, hematological, endocrine, respiratory, cardiovascular, gastrointestinal, musculoskeletal, neurological,  allergy and Immunology is done and is otherwise negative. PHYSICAL EXAM:    Vitals:  BP (!) 151/107   Pulse 106   Temp 100.8 °F (38.2 °C) (Oral)   Resp 22   Ht 6' 8\" (2.032 m)   Wt (!) 450 lb (204.1 kg)   SpO2 96%   BMI 49.44 kg/m²     General: Severe respiratory distress, anxious on BiPAP  Head: normocephalic, atraumatic  Eyes:No gross abnormalities. ENT:  MMM no lesions  Neck:  supple and no masses  Chest : Poor air movement, diffuse rales, no wheezing, nontender, tympanic  Heart[de-identified] Heart sounds are normal.  Regular rate and rhythm without murmur, gallop or rub. ABD:  symmetric, soft, non-tender, obese, no apparent guarding or rebound  Musculoskeletal : no cyanosis, no clubbing and no edema  Neuro:  Grossly normal  Skin: No rashes or nodules noted.   Lymph node:  no cervical nodes  Urology: No [facility]   Psychiatric: Anxious        Data Review  Recent Labs     04/05/21  0030 04/05/21  0046 04/05/21  0539 04/05/21  0951   WBC 6.4  --  7.2  --    HGB 13.6* 13.6 14.6 15.2   HCT 40.9*  --  44.0  --      --  215  --       Recent Labs     04/05/21  0030 04/05/21  0030 04/05/21  0057 04/05/21  0539 04/05/21  0951   *  --   --  130*  --    K 3.9  --   --  4.1  --    CL 91*  --   --  93*  --    CO2 23  --   --  22  --    BUN 12  --   --  12  --    CREATININE 0.65*   < > 0.6* 0.65* 1.0   GLUCOSE 306*  --   --  258*  --     < > = values in this interval not displayed. MV Settings:     Vent Mode: AC/VC  Vt Ordered: 500 mL  Rate Set: 22 bmp  PEEP/CPAP: 18  Peak Inspiratory Pressure: 39 cmH2O  Plateau Pressure: 31 cmH20  Mean Airway Pressure: 24 cmH20  I:E Ratio: 1:2.00    ABGs:   Recent Labs     04/05/21  0046 04/05/21  0951   PHART 7.478* 7.246*   ZGR6TCC 27* 53*   PO2ART 74* 450*   [id number] 20.1* 23.0   BEART -3 -4*   [id number] 96* 100*   QPC4EXL 21* 25     O2 Device: PAP (positive airway pressure)  O2 Flow Rate (L/min): 6 L/min  No results found for: [address]    Radiology  I personally reviewed imaging studies and bilateral groundglass infiltrates consistent with COVID-19 infection    Chest x-ray shows ET tube central line and OG tube in good position    Assessment, plan: This is a critically ill patient at risk of deterioration / death , needing close ICU monitoring and intervention due to below noted problems     · Acute hypoxic respiratory failure  · Secondary to COVID-19 infection  · Diabetes with hyperglycemia  · Hypercoagulable state secondary to COVID-19  · Obesity    Recommendations  · Vent support lung protective strategy  · head of the bed 30°  · Daily sedation holidays and breathing trials  · Sedation with combination propofol and fentanyl target R ASS of 0 to -1  · Watch for ICU delirium: TV on, natural light, avoid benzos, pain control, early mobility, and family engagement  · PUD prophylaxis  · DVT prophylaxis  · Dexamethasone 6 mg IV daily  · Remdesivir for 5 days  · Actemra x1 dose  · Start trophic tube feed  · Levophed on standby if needed  · Start insulin drip, target blood sugar 140-180  · Laxatives    Due to the immediate potential for life-threatening deterioration due to acute hypoxic respiratory failure, I spent 40 minutes providing critical care. This time is excluding time spent performing procedures.         Thank you for consultation    Electronically signed by Raheem Vargas MD, [facility],  on 4/5/2021 at 10:29 AM

## 2021-04-05 NOTE — PROCEDURES
PROCEDURE:   Endotracheal intubation. INDICATIONS:   Acute Respiratory Failure. Consent   The patient was and his mother counseled regarding the procedure, it's indications, risks, potential complications and alternatives and any questions were answered. Consent was obtained. PROCEDURE SUMMARY:   Permit was implied secondary to emergent situation. Intubation was done through bronchoscopy under conscious sedation, patient received fentanyl 50 mg, and Versed 6 mg, topical lidocaine applied to the mouth, the bronchoscope was advanced through the mouth into the trachea, at that point succinylcholine and etomidate were given and ET tube easily advanced. Breath sounds were heard in both lung fields equally. The endotracheal tube was placed at 25 cm, measured at the teeth. COMPLICATIONS:    None    ESTIMATED BLOOD LOSS:   None        Internal jugular central venous catheter; Ultrasound guided. [id number]    INDICATION:    Respiratory failure, COVID-19 infection need for central access      CONSENT:  The patient was  and patient mother counseled regarding the procedure, it's indications, risks, potential complications and alternatives and any questions were answered. Consent was obtained. PROCEDURE SUMMARY:   A time-out was performed. The patient's right neck region was prepped and draped in sterile fashion. The right internal jugular vein was accessed under ultrasound guidance using a finder needle and sheath. U/S images were permanently documented. Anesthesia was achieved with 1% lidocaine. The finder needle was inserted into the right neck under direct ultrasound guidance, and venous blood was withdrawn. The introducer needle was then inserted under direct ultrasound guidance and venous blood was withdrawn into the syringe. The syringe was removed and the guidewire was advanced through the introducer needle.  A small incision was made with a scalpel and the introducer needle was removed. A dilator was advanced over the guidewire until appropriate dilation was obtained. The dilator was removed and an central venous  catheter was advanced over the guidewire and secured into place with 2 sutures at 16 cm. At time of procedure completion, all ports aspirated and flushed properly. Estimated Blood loss   less than 5 cc    COMPLICATIONS:  None    PROCEDURE:   Radial artery line placement. (A-line)  [id number]    INDICATION:   Need for frequent ABG monitoring patient with COVID-19 infection and acute respiratory failure    CONSENT: The patient was and patient's mother counseled regarding the procedure, it's indications, risks, potential complications and alternatives and any questions were answered. Consent was obtained. nt. PROCEDURE SUMMARY:   Radial arteries were assessed by palpation and ultrasound localization. Vinnie Humbles test was done to asses collateral circulation. The patient was prepped and draped in the usual sterile manner using chlorhexidine scrub. 1% lidocaine was used to numb the region. The right  radial artery was palpated and successfully cannulated on the first pass. Pulsatile, arterial blood was visualized and the artery was then threaded using the Seldinger technique and a catheter was then sutured into place. Good wave-form was obtained. The patient tolerated the procedure well without any immediate complications. The area was cleaned and Tegaderm was applied. ESTIMATED BLOOD LOSS:   Minimal    COMPLICATIONS:  No immediate complication          Wendy Broussard 10:38 AM 4/5/2021 .

## 2021-04-05 NOTE — ED PROVIDER NOTES
[address] [facility] ED  EMERGENCY DEPARTMENT ENCOUNTER      Pt Name: Fran Ryan  MRN: [id number]  [place] 1990  Date of evaluation: 4/5/2021  Provider: Ni Calixto MD    [address] [place] [address]       Chief Complaint   Patient presents with    Shortness of Breath         HISTORY OF PRESENT ILLNESS   (Location/Symptom, Timing/Onset, Context/Setting, Quality, Duration, Modifying Factors, Severity)  Note limiting factors. 69-year-old male diagnosed with Covid about a week ago at [facility].  Has been using his inhaler with minimal improvement. Increased shortness of breath the last couple of days. States pulse ox dropped yesterday as well. No chest pain. No cough or fever. No history of any heart or lung problems. Nursing Notes were reviewed. REVIEW OF SYSTEMS    (2-9 systems for level 4, 10 or more for level 5)     Review of Systems   Respiratory: Positive for shortness of breath. All other systems reviewed and are negative. Except as noted above the remainder of the review of systems was reviewed and negative. PAST MEDICAL HISTORY   History reviewed. No pertinent past medical history. SURGICAL HISTORY     History reviewed. No pertinent surgical history. CURRENT MEDICATIONS       Previous Medications    ALBUTEROL SULFATE HFA (VENTOLIN HFA) 108 (90 BASE) MCG/ACT INHALER    Inhale 2 puffs into the lungs 4 times daily as needed for Wheezing    BROMPHENIRAMINE-PSEUDOEPHEDRINE-DM 2-30-10 MG/5ML SYRUP    Take 5 mLs by mouth 4 times daily as needed for Congestion or Cough       ALLERGIES     Patient has no known allergies. FAMILY HISTORY     History reviewed. No pertinent family history.        SOCIAL HISTORY       Social History     Socioeconomic History    Marital status: Single     Spouse name: None    Number of children: None    Years of education: None    Highest education level: None   Occupational History    None   Social Needs    Financial resource strain: None   Storypanda Palpations: Abdomen is soft. Tenderness: There is no abdominal tenderness. Musculoskeletal: Normal range of motion. General: No deformity. Skin:     General: Skin is warm and dry. Capillary Refill: Capillary refill takes less than 2 seconds. Neurological:      General: No focal deficit present. Mental Status: He is alert and oriented to person, place, and time. Mental status is at baseline. Cranial Nerves: No cranial nerve deficit. Psychiatric:         Thought Content: Thought content normal.         DIAGNOSTIC RESULTS     EKG: All EKG's are interpreted by the Emergency Department Physician who either signs or Co-signs this chart in the absence of a cardiologist.    Sinus tachycardia, rate 123, normal intervals, no ST elevation/ depression    RADIOLOGY:   Non-plain film images such as CT, Ultrasound and MRI are read by the radiologist. Plain radiographic images are visualized and preliminarily interpreted by the emergency physician with the below findings:    Interpretation per the Radiologist below, if available at the time of this note:    CTA Chest W WO  (PE study)    (Results Pending)       LABS:  Labs Reviewed   COVID-19, RAPID   BLOOD GAS, ARTERIAL   CBC WITH AUTO DIFFERENTIAL   COMPREHENSIVE METABOLIC PANEL   PROCALCITONIN   D-DIMER, QUANTITATIVE   TROPONIN       All other labs were within normal range or not returned as of this dictation. EMERGENCY DEPARTMENT COURSE and DIFFERENTIAL DIAGNOSIS/MDM:   Vitals:    Vitals:    04/05/21 0012   BP: 113/83   Pulse: 126   Resp: (!) 35   Temp: 98.3 °F (36.8 °C)   TempSrc: Oral   SpO2: 91%   Weight: (!) 450 lb (204.1 kg)   Height: 6' 8\" (2.032 m)       MDM  Number of Diagnoses or Management Options  Acute respiratory failure with hypoxia ([facility] [address]  COVID-19  Diagnosis management comments: 78-year-old male presenting in hypoxic respiratory distress.   Placed on 6L of oxygen and O2 saturation comes into the low 90s; persistently tachpneic and switched to bipap. Known to be Covid positive. Remainder of work-up is negative. CT scan shows significant disease. Plan to admit to hospital, Dr. Syed Salinas. Transferred from ED in fair condition. Procedures    CRITICAL CARE TIME   Total Critical Care time was 40 minutes, excluding separately reportable procedures. There was a high probability of clinically significant/life threatening deterioration in the patient's condition which required my urgent intervention. FINAL IMPRESSION      1. COVID-19    2.  Acute respiratory failure with hypoxia [facility])          DISPOSITION/PLAN   DISPOSITION Decision To Admit 04/05/2021 12:25:58 AM      (Please note that portions of this note were completed with a voice recognition program.  Efforts were made to edit the dictations but occasionally words are mis-transcribed.)    Tammy Duffy MD (electronically signed)  Attending Emergency Physician        Tammy Duffy MD  04/05/21 020       Tammy Duffy MD  04/05/21 5141

## 2021-04-05 NOTE — PROGRESS NOTES
Nutrition Note    Consult recieved for TF ordering and manage. If pt able to start TF (? r/o CDiff), start Low Calorie High Protein TF (Vital HP) @ 20 ml/hr.   100 ml water flush every 6 hrs    · Current Tube Feeding (TF) Orders:   · Feeding Route: Orogastric  · Formula: Low Calorie, High Protein  · Schedule: Continuous @ 20 ml/hr  · Water Flushes: 100 ml every 6 hrs  · Current TF & Flush Orders Provides: 480 kcal (+ propofol), 42 gm protein, ~800 ml free water  · Goal TF & Flush Orders Provides: 480 kcal (+ propofol),42 gm protein, ~ 800 ml free water      Electronically signed by Elina Harrell RD, LD on 4/5/21 at 11:49 AM EDT

## 2021-04-05 NOTE — ED TRIAGE NOTES
Patient to ED for c/o shortness of breath and bilateral leg edema. Patient was diagnosed Covid + this past Monday. Shortness of breath started a few days ago. Noted tachypnea. Skin is pink, warm, diaphoreses. Face is slightly pale. BLE edema noted with brunilda color.

## 2021-04-06 VITALS
TEMPERATURE: 58.8 F | SYSTOLIC BLOOD PRESSURE: 113 MMHG | HEIGHT: 78 IN | HEART RATE: 87 BPM | OXYGEN SATURATION: 98 % | BODY MASS INDEX: 36.45 KG/M2 | DIASTOLIC BLOOD PRESSURE: 58 MMHG | RESPIRATION RATE: 30 BRPM | WEIGHT: 315 LBS

## 2021-04-06 LAB
ALBUMIN SERPL-MCNC: 3.1 G/DL (ref 3.5–4.6)
ALP BLD-CCNC: 176 U/L (ref 35–104)
ALT SERPL-CCNC: 853 U/L (ref 0–41)
ANION GAP SERPL CALCULATED.3IONS-SCNC: 16 MEQ/L (ref 9–15)
APTT: 44.7 SEC (ref 24.4–36.8)
AST SERPL-CCNC: 3485 U/L (ref 0–40)
BASE EXCESS ARTERIAL: -7 (ref -3–3)
BASOPHILS ABSOLUTE: 0.1 K/UL (ref 0–0.2)
BASOPHILS RELATIVE PERCENT: 1.8 %
BILIRUB SERPL-MCNC: 0.8 MG/DL (ref 0.2–0.7)
BUN BLDV-MCNC: 40 MG/DL (ref 6–20)
C-REACTIVE PROTEIN: 175.2 MG/L (ref 0–5)
CALCIUM IONIZED: 1.02 MMOL/L (ref 1.12–1.32)
CALCIUM SERPL-MCNC: 7.6 MG/DL (ref 8.5–9.9)
CHLORIDE BLD-SCNC: 97 MEQ/L (ref 95–107)
CO2: 20 MEQ/L (ref 20–31)
CREAT SERPL-MCNC: 1.72 MG/DL (ref 0.7–1.2)
CREATININE URINE: 105 MG/DL
D DIMER: 8.95 MG/L FEU (ref 0–0.5)
EOSINOPHILS ABSOLUTE: 0 K/UL (ref 0–0.7)
EOSINOPHILS RELATIVE PERCENT: 0.2 %
FERRITIN: 4747 NG/ML (ref 30–400)
FIBRINOGEN: 606 MG/DL (ref 235–507)
GFR AFRICAN AMERICAN: 53
GFR AFRICAN AMERICAN: 56
GFR NON-AFRICAN AMERICAN: 44
GFR NON-AFRICAN AMERICAN: 46.3
GLOBULIN: 3.4 G/DL (ref 2.3–3.5)
GLUCOSE BLD-MCNC: 130 MG/DL (ref 60–115)
GLUCOSE BLD-MCNC: 130 MG/DL (ref 70–99)
GLUCOSE BLD-MCNC: 135 MG/DL (ref 60–115)
GLUCOSE BLD-MCNC: 144 MG/DL (ref 60–115)
GLUCOSE BLD-MCNC: 144 MG/DL (ref 60–115)
GLUCOSE BLD-MCNC: 162 MG/DL (ref 60–115)
GLUCOSE BLD-MCNC: 168 MG/DL (ref 60–115)
GLUCOSE BLD-MCNC: 169 MG/DL (ref 60–115)
GLUCOSE BLD-MCNC: 187 MG/DL (ref 60–115)
GLUCOSE BLD-MCNC: 200 MG/DL (ref 60–115)
GLUCOSE BLD-MCNC: 204 MG/DL (ref 60–115)
GLUCOSE BLD-MCNC: 228 MG/DL (ref 60–115)
HCO3 ARTERIAL: 18.8 MMOL/L (ref 21–29)
HCT VFR BLD CALC: 47.9 % (ref 42–52)
HEMOGLOBIN: 15.5 G/DL (ref 14–18)
HEMOGLOBIN: 16.9 GM/DL (ref 13.5–17.5)
INR BLD: 1.5
LACTATE DEHYDROGENASE: >2500 U/L (ref 135–225)
LACTATE: 1.42 MMOL/L (ref 0.4–2)
LACTIC ACID: 1.9 MMOL/L (ref 0.5–2.2)
LV EF: 20 %
LVEF MODALITY: NORMAL
LYMPHOCYTES ABSOLUTE: 1.2 K/UL (ref 1–4.8)
LYMPHOCYTES RELATIVE PERCENT: 18 %
MCH RBC QN AUTO: 26.3 PG (ref 27–31.3)
MCHC RBC AUTO-ENTMCNC: 32.4 % (ref 33–37)
MCV RBC AUTO: 81.3 FL (ref 80–100)
MONOCYTES ABSOLUTE: 0.5 K/UL (ref 0.2–0.8)
MONOCYTES RELATIVE PERCENT: 7.6 %
NEUTROPHILS ABSOLUTE: 4.8 K/UL (ref 1.4–6.5)
NEUTROPHILS RELATIVE PERCENT: 72.4 %
O2 SAT, ARTERIAL: 92 % (ref 93–100)
PCO2 ARTERIAL: 34 MM HG (ref 35–45)
PDW BLD-RTO: 16.2 % (ref 11.5–14.5)
PERFORMED ON: ABNORMAL
PH ARTERIAL: 7.36 (ref 7.35–7.45)
PLATELET # BLD: 194 K/UL (ref 130–400)
PO2 ARTERIAL: 67 MM HG (ref 75–108)
POC CHLORIDE: 103 MEQ/L (ref 99–110)
POC CREATININE: 1.8 MG/DL (ref 0.9–1.3)
POC FIO2: 70
POC HEMATOCRIT: 50 % (ref 41–53)
POC POTASSIUM: 4.4 MEQ/L (ref 3.5–5.1)
POC SAMPLE TYPE: ABNORMAL
POC SODIUM: 131 MEQ/L (ref 136–145)
POTASSIUM REFLEX MAGNESIUM: 4.6 MEQ/L (ref 3.4–4.9)
PROCALCITONIN: 10.6 NG/ML (ref 0–0.15)
PROTHROMBIN TIME: 18.2 SEC (ref 12.3–14.9)
RBC # BLD: 5.89 M/UL (ref 4.7–6.1)
SODIUM BLD-SCNC: 133 MEQ/L (ref 135–144)
SODIUM URINE: 37 MEQ/L
TCO2 ARTERIAL: 20 (ref 22–29)
TOTAL PROTEIN: 6.5 G/DL (ref 6.3–8)
WBC # BLD: 6.6 K/UL (ref 4.8–10.8)

## 2021-04-06 PROCEDURE — 93306 TTE W/DOPPLER COMPLETE: CPT

## 2021-04-06 PROCEDURE — 99232 SBSQ HOSP IP/OBS MODERATE 35: CPT | Performed by: INTERNAL MEDICINE

## 2021-04-06 PROCEDURE — 2580000003 HC RX 258

## 2021-04-06 PROCEDURE — 86140 C-REACTIVE PROTEIN: CPT

## 2021-04-06 PROCEDURE — 82565 ASSAY OF CREATININE: CPT

## 2021-04-06 PROCEDURE — 82803 BLOOD GASES ANY COMBINATION: CPT

## 2021-04-06 PROCEDURE — 6370000000 HC RX 637 (ALT 250 FOR IP): Performed by: INTERNAL MEDICINE

## 2021-04-06 PROCEDURE — 94003 VENT MGMT INPAT SUBQ DAY: CPT

## 2021-04-06 PROCEDURE — 85610 PROTHROMBIN TIME: CPT

## 2021-04-06 PROCEDURE — 2580000003 HC RX 258: Performed by: INTERNAL MEDICINE

## 2021-04-06 PROCEDURE — 85014 HEMATOCRIT: CPT

## 2021-04-06 PROCEDURE — 85379 FIBRIN DEGRADATION QUANT: CPT

## 2021-04-06 PROCEDURE — 6360000002 HC RX W HCPCS: Performed by: INTERNAL MEDICINE

## 2021-04-06 PROCEDURE — 2700000000 HC OXYGEN THERAPY PER DAY

## 2021-04-06 PROCEDURE — 6360000002 HC RX W HCPCS

## 2021-04-06 PROCEDURE — 82948 REAGENT STRIP/BLOOD GLUCOSE: CPT

## 2021-04-06 PROCEDURE — 80053 COMPREHEN METABOLIC PANEL: CPT

## 2021-04-06 PROCEDURE — 2500000003 HC RX 250 WO HCPCS: Performed by: INTERNAL MEDICINE

## 2021-04-06 PROCEDURE — 84132 ASSAY OF SERUM POTASSIUM: CPT

## 2021-04-06 PROCEDURE — 85025 COMPLETE CBC W/AUTO DIFF WBC: CPT

## 2021-04-06 PROCEDURE — 99291 CRITICAL CARE FIRST HOUR: CPT | Performed by: INTERNAL MEDICINE

## 2021-04-06 PROCEDURE — 84300 ASSAY OF URINE SODIUM: CPT

## 2021-04-06 PROCEDURE — 37799 UNLISTED PX VASCULAR SURGERY: CPT

## 2021-04-06 PROCEDURE — 31500 INSERT EMERGENCY AIRWAY: CPT

## 2021-04-06 PROCEDURE — 85384 FIBRINOGEN ACTIVITY: CPT

## 2021-04-06 PROCEDURE — C9113 INJ PANTOPRAZOLE SODIUM, VIA: HCPCS | Performed by: INTERNAL MEDICINE

## 2021-04-06 PROCEDURE — 84295 ASSAY OF SERUM SODIUM: CPT

## 2021-04-06 PROCEDURE — 82330 ASSAY OF CALCIUM: CPT

## 2021-04-06 PROCEDURE — 84145 PROCALCITONIN (PCT): CPT

## 2021-04-06 PROCEDURE — 82728 ASSAY OF FERRITIN: CPT

## 2021-04-06 PROCEDURE — 82570 ASSAY OF URINE CREATININE: CPT

## 2021-04-06 PROCEDURE — 2500000003 HC RX 250 WO HCPCS

## 2021-04-06 PROCEDURE — 83605 ASSAY OF LACTIC ACID: CPT

## 2021-04-06 PROCEDURE — 83615 LACTATE (LD) (LDH) ENZYME: CPT

## 2021-04-06 PROCEDURE — 85730 THROMBOPLASTIN TIME PARTIAL: CPT

## 2021-04-06 PROCEDURE — 36415 COLL VENOUS BLD VENIPUNCTURE: CPT

## 2021-04-06 PROCEDURE — 36592 COLLECT BLOOD FROM PICC: CPT

## 2021-04-06 PROCEDURE — 2000000000 HC ICU R&B

## 2021-04-06 PROCEDURE — 82435 ASSAY OF BLOOD CHLORIDE: CPT

## 2021-04-06 RX ORDER — MAGNESIUM HYDROXIDE 1200 MG/15ML
LIQUID ORAL
Status: COMPLETED
Start: 2021-04-06 | End: 2021-04-06

## 2021-04-06 RX ORDER — LEVOFLOXACIN 5 MG/ML
750 INJECTION, SOLUTION INTRAVENOUS
Status: DISCONTINUED | OUTPATIENT
Start: 2021-04-07 | End: 2021-04-07 | Stop reason: HOSPADM

## 2021-04-06 RX ORDER — ACETAMINOPHEN 500 MG
1000 TABLET ORAL EVERY 6 HOURS PRN
Status: DISCONTINUED | OUTPATIENT
Start: 2021-04-06 | End: 2021-04-07 | Stop reason: HOSPADM

## 2021-04-06 RX ORDER — POLYETHYLENE GLYCOL 3350 17 G/17G
17 POWDER, FOR SOLUTION ORAL DAILY
Status: DISCONTINUED | OUTPATIENT
Start: 2021-04-06 | End: 2021-04-07 | Stop reason: HOSPADM

## 2021-04-06 RX ADMIN — ACETAMINOPHEN 1000 MG: 500 TABLET ORAL at 05:03

## 2021-04-06 RX ADMIN — SODIUM CHLORIDE 6.72 UNITS/HR: 9 INJECTION, SOLUTION INTRAVENOUS at 06:35

## 2021-04-06 RX ADMIN — PROPOFOL 25 MCG/KG/MIN: 10 INJECTION, EMULSION INTRAVENOUS at 13:36

## 2021-04-06 RX ADMIN — CHLORHEXIDINE GLUCONATE 15 ML: 1.2 RINSE ORAL at 08:25

## 2021-04-06 RX ADMIN — SODIUM CHLORIDE 5.6 UNITS/HR: 9 INJECTION, SOLUTION INTRAVENOUS at 05:02

## 2021-04-06 RX ADMIN — SODIUM CHLORIDE, PRESERVATIVE FREE 10 ML: 5 INJECTION INTRAVENOUS at 08:47

## 2021-04-06 RX ADMIN — SODIUM CHLORIDE 1 MCG/KG/HR: 9 INJECTION, SOLUTION INTRAVENOUS at 08:20

## 2021-04-06 RX ADMIN — SODIUM CHLORIDE 1 MCG/KG/HR: 9 INJECTION, SOLUTION INTRAVENOUS at 20:54

## 2021-04-06 RX ADMIN — PROPOFOL 25 MCG/KG/MIN: 10 INJECTION, EMULSION INTRAVENOUS at 20:54

## 2021-04-06 RX ADMIN — PROPOFOL 25 MCG/KG/MIN: 10 INJECTION, EMULSION INTRAVENOUS at 10:18

## 2021-04-06 RX ADMIN — SODIUM CHLORIDE 1000 ML: 900 IRRIGANT IRRIGATION at 04:49

## 2021-04-06 RX ADMIN — SODIUM CHLORIDE 9.81 UNITS/HR: 9 INJECTION, SOLUTION INTRAVENOUS at 03:21

## 2021-04-06 RX ADMIN — SODIUM CHLORIDE 1 MCG/KG/HR: 9 INJECTION, SOLUTION INTRAVENOUS at 15:16

## 2021-04-06 RX ADMIN — SODIUM CHLORIDE 10.1 UNITS/HR: 9 INJECTION, SOLUTION INTRAVENOUS at 15:29

## 2021-04-06 RX ADMIN — SODIUM CHLORIDE 1 MCG/KG/HR: 9 INJECTION, SOLUTION INTRAVENOUS at 12:07

## 2021-04-06 RX ADMIN — DEXAMETHASONE SODIUM PHOSPHATE 6 MG: 4 INJECTION, SOLUTION INTRA-ARTICULAR; INTRALESIONAL; INTRAMUSCULAR; INTRAVENOUS; SOFT TISSUE at 05:43

## 2021-04-06 RX ADMIN — DOCUSATE SODIUM 100 MG: 50 LIQUID ORAL at 14:07

## 2021-04-06 RX ADMIN — SODIUM CHLORIDE 6.75 UNITS/HR: 9 INJECTION, SOLUTION INTRAVENOUS at 01:13

## 2021-04-06 RX ADMIN — Medication 4 MCG/MIN: at 18:03

## 2021-04-06 RX ADMIN — MEROPENEM 1000 MG: 1 INJECTION, POWDER, FOR SOLUTION INTRAVENOUS at 18:02

## 2021-04-06 RX ADMIN — POLYETHYLENE GLYCOL 3350 17 G: 17 POWDER, FOR SOLUTION ORAL at 14:07

## 2021-04-06 RX ADMIN — SODIUM CHLORIDE 1 MCG/KG/HR: 9 INJECTION, SOLUTION INTRAVENOUS at 02:01

## 2021-04-06 RX ADMIN — SODIUM CHLORIDE, PRESERVATIVE FREE 10 ML: 5 INJECTION INTRAVENOUS at 21:33

## 2021-04-06 RX ADMIN — PROPOFOL 25 MCG/KG/MIN: 10 INJECTION, EMULSION INTRAVENOUS at 18:03

## 2021-04-06 RX ADMIN — WATER: 1 IRRIGANT IRRIGATION at 10:15

## 2021-04-06 RX ADMIN — SODIUM CHLORIDE 1 MCG/KG/HR: 9 INJECTION, SOLUTION INTRAVENOUS at 06:53

## 2021-04-06 RX ADMIN — SODIUM CHLORIDE 0.8 MCG/KG/HR: 9 INJECTION, SOLUTION INTRAVENOUS at 21:33

## 2021-04-06 RX ADMIN — SODIUM CHLORIDE 1 MCG/KG/HR: 9 INJECTION, SOLUTION INTRAVENOUS at 13:38

## 2021-04-06 RX ADMIN — SODIUM CHLORIDE 1 MCG/KG/HR: 9 INJECTION, SOLUTION INTRAVENOUS at 16:44

## 2021-04-06 RX ADMIN — PANTOPRAZOLE SODIUM 40 MG: 40 INJECTION, POWDER, FOR SOLUTION INTRAVENOUS at 08:46

## 2021-04-06 RX ADMIN — PROPOFOL 12 MCG/KG/MIN: 10 INJECTION, EMULSION INTRAVENOUS at 07:48

## 2021-04-06 RX ADMIN — MEROPENEM 1000 MG: 1 INJECTION, POWDER, FOR SOLUTION INTRAVENOUS at 06:38

## 2021-04-06 RX ADMIN — ENOXAPARIN SODIUM 40 MG: 40 INJECTION SUBCUTANEOUS at 20:55

## 2021-04-06 RX ADMIN — PROPOFOL 19.97 MCG/KG/MIN: 10 INJECTION, EMULSION INTRAVENOUS at 04:41

## 2021-04-06 RX ADMIN — CHLORHEXIDINE GLUCONATE 15 ML: 1.2 RINSE ORAL at 20:55

## 2021-04-06 RX ADMIN — SODIUM CHLORIDE 6.75 UNITS/HR: 9 INJECTION, SOLUTION INTRAVENOUS at 02:05

## 2021-04-06 RX ADMIN — PROPOFOL 25 MCG/KG/MIN: 10 INJECTION, EMULSION INTRAVENOUS at 15:29

## 2021-04-06 RX ADMIN — DOCUSATE SODIUM 100 MG: 50 LIQUID ORAL at 20:55

## 2021-04-06 RX ADMIN — SODIUM CHLORIDE 1 MCG/KG/HR: 9 INJECTION, SOLUTION INTRAVENOUS at 18:35

## 2021-04-06 RX ADMIN — FENTANYL CITRATE 50 MCG/HR: 50 INJECTION, SOLUTION INTRAMUSCULAR; INTRAVENOUS at 18:05

## 2021-04-06 RX ADMIN — Medication 10.03 MCG/MIN: at 04:41

## 2021-04-06 RX ADMIN — PROPOFOL 22 MCG/KG/MIN: 10 INJECTION, EMULSION INTRAVENOUS at 02:04

## 2021-04-06 RX ADMIN — SODIUM CHLORIDE 1 MCG/KG/HR: 9 INJECTION, SOLUTION INTRAVENOUS at 05:08

## 2021-04-06 RX ADMIN — ACETAMINOPHEN 650 MG: 325 TABLET ORAL at 00:07

## 2021-04-06 RX ADMIN — Medication 12 MCG/MIN: at 03:28

## 2021-04-06 RX ADMIN — SODIUM CHLORIDE 1 MCG/KG/HR: 9 INJECTION, SOLUTION INTRAVENOUS at 10:11

## 2021-04-06 RX ADMIN — REMDESIVIR 100 MG: 100 INJECTION, POWDER, LYOPHILIZED, FOR SOLUTION INTRAVENOUS at 08:50

## 2021-04-06 RX ADMIN — ENOXAPARIN SODIUM 40 MG: 40 INJECTION SUBCUTANEOUS at 08:46

## 2021-04-06 RX ADMIN — FENTANYL CITRATE 50 MCG/HR: 50 INJECTION, SOLUTION INTRAMUSCULAR; INTRAVENOUS at 02:05

## 2021-04-06 ASSESSMENT — PAIN SCALES - GENERAL
PAINLEVEL_OUTOF10: 0

## 2021-04-06 ASSESSMENT — PULMONARY FUNCTION TESTS
PIF_VALUE: 39
PIF_VALUE: 38
PIF_VALUE: 35
PIF_VALUE: 37
PIF_VALUE: 39
PIF_VALUE: 41
PIF_VALUE: 35
PIF_VALUE: 40
PIF_VALUE: 28
PIF_VALUE: 39
PIF_VALUE: 39
PIF_VALUE: 38
PIF_VALUE: 41
PIF_VALUE: 47
PIF_VALUE: 41
PIF_VALUE: 45
PIF_VALUE: 36
PIF_VALUE: 38
PIF_VALUE: 45
PIF_VALUE: 33

## 2021-04-06 NOTE — PROGRESS NOTES
0700am  Report from [facility]. Vented Sedated. Temp 41 via bladder. Levo propofol Fentanyl Insulin Precedex Infusing via central line without issues. Central line in R IJ site without redness swelling. Preciado to CD. Opens eyes at intervals. Does not follow commands  09:15am Critical care rounds with Dr Marlys Nuñez and team  1000am Normal thermia via artic sun. Temp 41.1  12 noon Remains artic sun for normalthermia plus cooling blanket. Temp remains at 106. Responds to pain. 12:45pm Dr Lulú Walters in and updated on status. Started progress to transfer patient to tertiary [facility] center  16:30pm ABd girth  Measured 219cm.   Temp remains 106  1800pm DR Marlys Nuñez called and message sent to Dr Lulú Walters about patient transfering to [facility]  18;30pm Dr Michelle Hoffmann sent message to notify of patient being transferred to [facility]

## 2021-04-06 NOTE — CARE COORDINATION
Team ICU rounds done this am with Dr. Michael Bird and team outside of patients room. Dr. Esau Jett also on consult and seeing pt. The plan is for pt to tx to tertiary care at facility that can do more with testing etc for his weight and size. Pt remains in isolation on vent and arctic sun to try and cool him. Shelby Tian from [facility] did speak to patients mother and will try to complete paperwork for him. He has no insurance.

## 2021-04-06 NOTE — PROGRESS NOTES
Infectious Disease     Patient Name: Zafar Hopson  Date: 4/6/2021  YOB: 1990  Medical Record Number: [id number]        covid 23 pneumonia  Atypical community-acquired pneumonia        Patient was initially on BiPAP 100% breathing up to 40 times a minute which required him to be intubated  Fevers    Procalcitonin and CRP are elevated    Patient apparently had tested positive for COVID-19 in January 2021      High fevers on pressors      Review of Systems   Unable to perform ROS: Intubated            Physical Exam   Cardiovascular: Normal heart sounds. No murmur heard. Pulmonary/Chest: Effort normal and breath sounds normal. No respiratory distress. He has no wheezes. He has no rales. He exhibits no tenderness. Scattered rhonchi   Abdominal: Soft. Bowel sounds are normal. He exhibits no distension and no mass. There is no abdominal tenderness. There is no rebound and no guarding. Musculoskeletal:         General: Tenderness and edema present. Blood pressure 118/72, pulse 90, temperature (!) 106.2 °F (41.2 °C), temperature source Bladder, resp. rate 30, height 6' 8\" (2.032 m), weight (!) 606 lb 4.2 oz (275 kg), SpO2 98 %.       .   Lab Results   Component Value Date    WBC 6.6 04/06/2021    HGB 16.9 04/06/2021    HCT 47.9 04/06/2021    MCV 81.3 04/06/2021     04/06/2021     Lab Results   Component Value Date     04/06/2021    K 4.6 04/06/2021    CL 97 04/06/2021    CO2 20 04/06/2021    BUN 40 04/06/2021    CREATININE 1.8 04/06/2021    CREATININE 1.72 04/06/2021    GLUCOSE 130 04/06/2021    CALCIUM 7.6 04/06/2021        COVID-19, Rapid [[id number]] (Abnormal) Collected: 04/05/21 0054   Order Status: Completed Specimen: Nasopharyngeal Swab Updated: 04/05/21 0107    SARS-CoV-2, NAAT DETECTEDAbnormal          CT PULMONARY ANGIOGRAM WITH INTRAVENOUS CONTRAST MEDIUM.       REASON FOR EXAMINATION:  SHORTNESS OF BREATH        TECHNIQUE: Helical CTA was performed through the chest utilizing 100 mL of Isovue-300 intravenous contrast.  Images were obtained with bolus tracking in order to opacify the pulmonary arteries.  Thick section coronal MIP 3D reconstructions were performed    on a separate workstation.       COMPARISON:none       FINDINGS:         Study limited secondary to suboptimal bolus administration.       Pulmonary arteries: No intraluminal filling defects, main, right, and left pulmonary arteries. Bilateral interlobar, segmental, and subsegmental branches cannot be assessed secondary to suboptimal bolus administration. Transverse diameter, main pulmonary    artery 4.0 cm at level pulmonary chill bifurcation.       Thoracic aorta: Normal in course and caliber. Transverse diameter, thoracic aorta, 3.2 cm, at level of pulmonary arterial bifurcation.       Cardiac Size: Normal.       Pericardial effusion: None.       Right lung: No nodules, masses, pleural effusion, pneumothorax. Diffuse, peripheral, groundglass opacities, right lung.           Left lung: No nodules, masses, pleural effusion, pneumothorax. Diffuse, peripheral, groundglass opacities, left lung.       Lymph nodes: No hilar, mediastinal, or axillary lymph node enlargement.       Upper abdomen:Limited imaging upper abdomen shows reflux of contrast medium into inferior vena cava and hepatic veins.       Musculoskeletal:No osteoblastic, and no osteolytic lesions.       I did not see the       Impression       Limited study as discussed.       Marked diffuse bilateral groundglass opacities. These findings may be seen in patients with covid 19 pneumonia. However, other infectious and inflammatory etiologies may cause similar radiographic appearance.       No CT evidence pulmonary embolism main, right, and left pulmonary arteries.  Bilateral interlobar, segmental, and subsegmental branches cannot be assessed secondary to suboptimal bolus administration.       Enlarged main pulmonary artery as discussed, suggestive of pulmonary arterial hypertension.       Reflux of contrast medium into hepatic veins, suggestive of right heart dysfunction/failure.          ASSESSMENT:  Patient Active Problem List   Diagnosis    Acute respiratory failure with hypoxia ([address]    New onset type 2 diabetes mellitus ([address]         PLAN:    covid 19 pneumonia  Atypical community-acquired pneumonia    Started on remdesivir given a dose of Actemra  On dexamethasone  Intubated    The question is this worsening of initial COVID-19 infection reinfection with COVID-19 progressive disease with superimposed bacterial infection given elevated procalcitonin      Levaquin meropenem combination for atypical community-acquired pneumonia

## 2021-04-06 NOTE — PROGRESS NOTES
Patient bottom is red and mepilex in place. Patient is in bariatric bed, heels elevated in boots with SCD on cycle.

## 2021-04-06 NOTE — CONSULTS
[address]                      [facility], [address] [facility]                                  CONSULTATION    PATIENT NAME: Mary Fitzpatrick                     :        1990  MED REC NO:   [id number]                            ROOM:       IC10  ACCOUNT NO:   [de-identified]                           ADMIT DATE: 2021  PROVIDER:     Judi Sánchez MD    CONSULT DATE:  2021    ENDOCRINE CONSULTATION    REFERRING PROVIDER:  Fran Yi MD    REASON FOR CONSULTATION:  Management of new onset type 2 diabetes. HISTORY OF PRESENT ILLNESS:  This is a virtual visit. The patient was  seen through glass window in ICU in isolation due to COVID-19. The  patient is a 27-year-old male with no prior history of diabetes,  admitted because of shortness of breath, tested positive for COVID a  week ago. Symptoms started with fever, nausea, vomiting, diarrhea,  cough has been worsening, and shortness of breath for past 2 days. Apparently, he had positive COVID past 2 months and a half. Symptoms  are getting worse. The patient's blood sugar was over 300 at the time  of admission, admitted with initial diagnosis of respiratory failure  with COVID-19, new-onset type 2 diabetes, and hypertension. The patient  was eventually intubated to avoid acute respiratory arrest and later  started on IV insulin drip. Blood sugars have improved in the 140-180  range. Initially, he was in a high rate of insulin at 17, has come down  to 8 units per hour. The patient's hemoglobin A1c was 11.2, consistent  with moderate diabetes prior to diagnosis. The patient is also on  Decadron 6 mg IV every 24 hours, Merrem, and remdesivir. PAST MEDICAL HISTORY:  Significant morbid obesity. PAST SURGICAL HISTORY:  Reviewed, noncontributory. FAMILY HISTORY:  Reviewed, noncontributory. PERSONAL AND SOCIAL HISTORY:  Denies any smoking.     CURRENT MEDICATIONS:  Meds here were reviewed including IV insulin drip,  remdesivir, Decadron, Lovenox, Levaquin, Merrem, Protonix, and Precedex. ALLERGIES:  None. REVIEW OF SYSTEMS:  Other than respiratory failure, cough, weakness, and  hypoxia, 14-point review of systems was unable to obtain. PHYSICAL EXAMINATION:  As per hospitalist, pulmonary critical care. ASSESSMENT:  New-onset type 2 diabetes, morbid obesity, respiratory  failure due to COVID-19 pneumonia. Pulmonary Critical Care and  Infectious Disease on consult. PLAN:  Continue the patient on IV insulin drip till he is extubated,  after that we will start him on subcu insulin. Would go home on 4  insulin injections daily. Maintain blood sugar control between 120-200. Avoid hypoglycemia. Would need diabetes education once he is stabilized  and extubated. Long-term A1c goal of 7 or lower. Thank you for the consult.         Carole Cobb MD    D: 04/05/2021 22:24:25       T: 04/05/2021 22:27:58     [email or web address]  Job#: [id number]     Doc#: [id number]    CC:

## 2021-04-06 NOTE — PROGRESS NOTES
mg, 6 mg, Intravenous, Q24H  glucose (GLUTOSE) 40 % oral gel 15 g, 15 g, Oral, PRN  dextrose 50 % IV solution, 12.5 g, Intravenous, PRN  glucagon (rDNA) injection 1 mg, 1 mg, Intramuscular, PRN  dextrose 5 % solution, 100 mL/hr, Intravenous, PRN  0.9 % sodium chloride bolus, 30 mL, Intravenous, PRN  fentaNYL (SUBLIMAZE) 1,000 mcg in sodium chloride 0.9 % 100 mL infusion, 12.5-200 mcg/hr, Intravenous, Continuous  insulin regular (HUMULIN R;NOVOLIN R) 100 Units in sodium chloride 0.9 % 100 mL infusion, 0.5 Units/hr, Intravenous, Continuous  pantoprazole (PROTONIX) injection 40 mg, 40 mg, Intravenous, Daily **AND** sodium chloride (PF) 0.9 % injection 10 mL, 10 mL, Intravenous, Daily  chlorhexidine (PERIDEX) 0.12 % solution 15 mL, 15 mL, Mouth/Throat, BID  norepinephrine (LEVOPHED) 16 mg in dextrose 5% 250 mL infusion, 2-100 mcg/min, Intravenous, Continuous  dexmedetomidine (PRECEDEX) 400 mcg in sodium chloride 0.9 % 100 mL infusion, 0.2-1.4 mcg/kg/hr, Intravenous, Continuous  propofol injection, 5-50 mcg/kg/min, Intravenous, Titrated  Physical    VITALS:  /72   Pulse 90   Temp (!) 106.2 °F (41.2 °C) (Bladder)   Resp 30   Ht 6' 8\" (2.032 m)   Wt (!) 606 lb 4.2 oz (275 kg)   SpO2 98%   BMI 66.60 kg/m²   Constitutional: intubated, sedated  Head: Normocephalic, atraumatic  ENT: moist mucous membranes  Neck: neck supple, trachea midline  Lungs: non labored  Heart: RRR on tele  GI: non-distended  MSK: no edema noted       Data    CBC:   Lab Results   Component Value Date    WBC 6.6 04/06/2021    RBC 5.89 04/06/2021    HGB 16.9 04/06/2021    HCT 47.9 04/06/2021    MCV 81.3 04/06/2021    MCH 26.3 04/06/2021    MCHC 32.4 04/06/2021    RDW 16.2 04/06/2021     04/06/2021     CMP:    Lab Results   Component Value Date     04/06/2021    K 4.6 04/06/2021    CL 97 04/06/2021    CO2 20 04/06/2021    BUN 40 04/06/2021    CREATININE 1.8 04/06/2021    CREATININE 1.72 04/06/2021    GFRAA 53 04/06/2021    LABGLOM 44 04/06/2021    GLUCOSE 130 04/06/2021    PROT 6.5 04/06/2021    LABALBU 3.1 04/06/2021    CALCIUM 7.6 04/06/2021    BILITOT 0.8 04/06/2021    ALKPHOS 176 04/06/2021    AST 3,485 04/06/2021     04/06/2021       ASSESSMENT AND PLAN      # Acute hypoxic respiratory failure and septic shock 2/2 COVID-19 pneumonia  - tested positive on 1/17/21. Now with worsening symptoms and positive test on arrival - re-infection?  - started on remdesivir, decadron. Sp Actemra  - ID and pulm consulted  - intubated 4/5 due to worsening respiratory status  - CTA negative for PE, but with bilateral groundglass  - vent management per intensivist  - cont empiric meropenem, levaquin per ID recs  - awaiting culture results  - will need CT abd due to persistent fevers, hypotension, but unable to perform at this facility. Initiated transfer to [facility] [address] pending bed availability    # Elevated LFTs  - shock liver  - monitor with BP support    # New onset diabetes  - A1C 11.2  - endocrinology consulted  - ISS, lantus    # HTN  - continue to monitor  - prn meds as needed    DVT: lovenox per COVID protocol    Disposition: Pt with worsening respiratory status and intubated, now with persistent high fevers. Will need further workup unable to be completed here. Initiated transfer to [facility], currently without beds, but will transfer when bed is available. Pulm and ID consulted.        Anabel Stone, DO  Internal Medicine

## 2021-04-06 NOTE — PROGRESS NOTES
Pulmonary & Critical Care Medicine ICU Progress Note  Chief complaint : Shock and respiratory failure, COVID-19 infection    Subjunctive/24 hour events :   Patient seen and examined during multidisciplinary rounds with RN, charge nurse, RT, pharmacy, dietitian, and social service. On vent, sedated he was attempted with sedation holiday today he was agitated trying to pull on his tube and lines, continues to have fever up to 104.4, he is on 70% FiO2, 96% saturation, PEEP is 12, plateau pressure is maintained 30, urine output 640 cc, he is +3 L, recorded vomiting x1, he is on tube feed ,no bowel movement. Social History     Tobacco Use    Smoking status: Never Smoker    Smokeless tobacco: Never Used   Substance Use Topics    Alcohol use: No     History reviewed. No pertinent family history. Recent Labs     04/05/21 0951 04/06/21  0535   PHART 7.246* 7.356   [id number] 53* 34*   PO2ART 450* 67*       MV Settings:  Vent Mode: AC/VC Rate Set: 30 bmp/Vt Ordered: 580 mL/ /FiO2 : 70 %           IV:   sodium chloride      dextrose      fentaNYL (SUBLIMAZE) infusion 50 mcg/hr (04/06/21 0205)    insulin 6.72 Units/hr (04/06/21 0635)    norepinephrine 10 mcg/min (04/06/21 0821)    dexmedetomidine 1 mcg/kg/hr (04/06/21 0820)    propofol 12 mcg/kg/min (04/06/21 0748)       Vitals:  /72   Pulse 90   Temp 104.4 °F (40.2 °C) (Rectal)   Resp 30   Ht 6' 8\" (2.032 m)   Wt (!) 596 lb 1.6 oz (270.4 kg)   SpO2 96%   BMI 65.49 kg/m²    Tmax:        Intake/Output Summary (Last 24 hours) at 4/6/2021 0826  Last data filed at 4/6/2021 0535  Gross per 24 hour   Intake 3774.35 ml   Output 790 ml   Net 2984.35 ml       EXAM:  General: On vent, sedated not responding  Head: normocephalic, atraumatic  Eyes:No gross abnormalities.   ENT:  MMM no lesions  Neck:  supple and no masses  Chest : Distant sounds, bilateral rales, no wheezing, nontender, tympanic  Heart[de-identified] Heart sounds are normal.  Regular rate and rhythm without murmur, gallop or rub. ABD:  symmetric, soft, non-tender, no apparent guarding or rebound  Musculoskeletal : no cyanosis, no clubbing and trace + edema-     Neuro:  Sedated, unresponsive, when he wakes up he moves all 4 extremities  Skin: No rashes or nodules noted. Lymph node:  no cervical nodes  Urology: Yes Preciado   Psychiatric: unresponsive    Medications:  Scheduled Meds:   sodium chloride flush  10 mL Intravenous 2 times per day    enoxaparin  40 mg Subcutaneous BID    dexamethasone  6 mg Intravenous Q24H    remdesivir IVPB  100 mg Intravenous Q24H    pantoprazole  40 mg Intravenous Daily    And    sodium chloride (PF)  10 mL Intravenous Daily    chlorhexidine  15 mL Mouth/Throat BID    levofloxacin  750 mg Intravenous Q24H    meropenem  1,000 mg Intravenous Q8H       PRN Meds:  acetaminophen, sodium chloride flush, sodium chloride, promethazine **OR** ondansetron, polyethylene glycol, guaiFENesin-dextromethorphan, glucose, dextrose, glucagon (rDNA), dextrose, sodium chloride    Results: reviewed by me   CBC:   Recent Labs     04/05/21  0030 04/05/21  0030 04/05/21  0539 04/05/21  0951 04/06/21  0509 04/06/21  0535   WBC 6.4  --  7.2  --  6.6  --    HGB 13.6*   < > 14.6 15.2 15.5 16.9   HCT 40.9*  --  44.0  --  47.9  --    MCV 80.0  --  80.2  --  81.3  --      --  215  --  194  --     < > = values in this interval not displayed. BMP:   Recent Labs     04/05/21  0030 04/05/21  0030 04/05/21  0539 04/05/21  0951 04/06/21  0509 04/06/21  0535   *  --  130*  --  133*  --    K 3.9  --  4.1  --  4.6  --    CL 91*  --  93*  --  97  --    CO2 23  --  22  --  20  --    BUN 12  --  12  --  40*  --    CREATININE 0.65*   < > 0.65* 1.0 1.72* 1.8*    < > = values in this interval not displayed.      LIVER PROFILE:   Recent Labs     04/05/21  0030 04/05/21  0539 04/06/21  0509   AST 74* 80* 3,485*   ALT 51* 53* 853*   BILITOT 0.9* 0.8* 0.8*   ALKPHOS 140* 152* 176*     PT/INR:   Recent Labs 04/06/21  0508   PROTIME 18.2*   INR 1.5     APTT:   Recent Labs     04/06/21  0508   APTT 44.7*     UA:No results for input(s): NITRITE, COLORU, PHUR, LABCAST, WBCUA, RBCUA, MUCUS, TRICHOMONAS, YEAST, BACTERIA, CLARITYU, SPECGRAV, LEUKOCYTESUR, UROBILINOGEN, BILIRUBINUR, BLOODU, GLUCOSEU, AMORPHOUS in the last 72 hours. Invalid input(s): Sarah Handley    Cultures:  Negative so far  Films:  CXR reviewed by me and it showed bilateral groundglass infiltrates      Assessment:   This is a critically ill patient at risk of deterioration / death , needing close ICU monitoring and intervention due to below noted problems     · Acute hypoxic respiratory failure  · Severe COVID-19 infection  · Acute kidney injury secondary to COVID-19  · Shock secondary to COVID-19 infection and sepsis  · Sepsis with bacterial coinfection procalcitonin increased to 10.6  · Worsening LFTs, likely shock liver  · Hyperglycemia  · Obesity  · Hypercoagulable state secondary to COVID-19    Recommendations  · Vent support lung protective strategy  · head of the bed 30°  · Daily sedation holidays and breathing trials  · Sedation with combination propofol and fentanyl target R ASS of 0 to -1  · Watch for ICU delirium: TV on, natural light, avoid benzos, pain control, early mobility, and family engagement  · PUD prophylaxis  · DVT prophylaxis  · Prone position today, 18 hours prone 6 hours supine until oxygenation improves  · Watch renal function and urine output  · Consult nephrology  · Continue antibiotics and appreciate ID  · Follow-up cultures  · Monitor urine output and renal function  · Monitor electrolytes  · Continue remdesivir  · Continue dexamethasone  · Received Actemra   · Monitor LFT  · Levophed to maintain MAP ~pressure 65-70  · Continue insulin drip Target blood sugar 140-180    DW Dr Antony Partida     Due to the immediate potential for life-threatening deterioration due to acute respiratory failure I spent 38  minutes providing critical care.  This time is excluding time spent performing procedures.           Electronically signed by Melissa Melvin MD,  [facility] ,on 4/6/2021 at 8:26 AM

## 2021-04-06 NOTE — PLAN OF CARE
Problem: Airway Clearance - Ineffective  Goal: Achieve or maintain patent airway  Outcome: Ongoing     Problem: Gas Exchange - Impaired  Goal: Absence of hypoxia  Outcome: Ongoing  Goal: Promote optimal lung function  Outcome: Ongoing     Problem: Breathing Pattern - Ineffective  Goal: Ability to achieve and maintain a regular respiratory rate  Outcome: Ongoing     Problem:  Body Temperature -  Risk of, Imbalanced  Goal: Ability to maintain a body temperature within defined limits  Outcome: Ongoing  Goal: Will regain or maintain usual level of consciousness  Outcome: Ongoing  Goal: Complications related to the disease process, condition or treatment will be avoided or minimized  Outcome: Ongoing     Problem: Isolation Precautions - Risk of Spread of Infection  Goal: Prevent transmission of infection  Outcome: Ongoing     Problem: Nutrition Deficits  Goal: Optimize nutritional status  Outcome: Ongoing     Problem: Risk for Fluid Volume Deficit  Goal: Maintain normal heart rhythm  Outcome: Ongoing  Goal: Maintain absence of muscle cramping  Outcome: Ongoing  Goal: Maintain normal serum potassium, sodium, calcium, phosphorus, and pH  Outcome: Ongoing     Problem: Loneliness or Risk for Loneliness  Goal: Demonstrate positive use of time alone when socialization is not possible  Outcome: Ongoing     Problem: Fatigue  Goal: Verbalize increase energy and improved vitality  Outcome: Ongoing     Problem: Patient Education: Go to Patient Education Activity  Goal: Patient/Family Education  Outcome: Ongoing     Problem: Falls - Risk of:  Goal: Will remain free from falls  Description: Will remain free from falls  Outcome: Ongoing  Goal: Absence of physical injury  Description: Absence of physical injury  Outcome: Ongoing     Problem: Non-Violent Restraints  Goal: Removal from restraints as soon as assessed to be safe  Outcome: Ongoing  Goal: No harm/injury to patient while restraints in use  Outcome: Ongoing  Goal: Patient's dignity will be maintained  Outcome: Ongoing     Problem: Nutrition  Goal: Optimal nutrition therapy  Outcome: Ongoing     Problem: Skin Integrity:  Goal: Will show no infection signs and symptoms  Description: Will show no infection signs and symptoms  Outcome: Ongoing  Goal: Absence of new skin breakdown  Description: Absence of new skin breakdown  Outcome: Ongoing   Continue plan of care.

## 2021-04-06 NOTE — FLOWSHEET NOTE
Received patient from [facility]. Patient too large for regular ICU bed. Spoke to [facility] at [facility]- patient is on the list for a bariatric bed- however \"has several people ahead of him\" will get to him \"when able\". Patient sitting up in bed. Respiratory rate in the 45s. Labored. Sweating. Flushed. Accessory muscle breathing on the bipap. Sats dropping to 85%. Dr Rubina Willingham came in at [facility]. Assessed patient- needs to be intubated. Spoke to mother Rosana Bridegroom- Next of Kin- okay to intubate, place cvc and art lines. Witnessed with Erica JARAMILLO. Around 0800- entered room. By 0428 patient had been intubated, CVC line placed, Art line placed. ETT 7.5 23 at the teeth. OG 18Fr 80 at the teeth to LIWS. CVC to R IJ. Art line to R radial.   Temp cobos inserted. Geeta urine returned. 400 out for the shift. Patient had a high temp of 104.9- dr Phillip Guido aware. Given tylenol and placed on cooling blanket. Dr Shekhar Jackson consulted. Dr Shekhar Jackson rounded. Started on merrem and levaquin. Patient started on propfol, precedex increased, fentanyl started. MAP mid to low 60s. Low dose levo started. Titrated to MAP goal 65- see emar. Sugars high at 438- started insulin drip. Bariatric bed arrived- moved to new bed. Linens changed at this time. Patient bathed. Patient incontinent for large amount of urine. Zeroed bed prior to patient getting on new bed- accurate weight received. 596 lbs. Updated pharmacy with new weight. Skin on coccyx, buttocks, and groin appear to have a fungal infection- red and wet. Some spots bleeding. Dried- placed interdry in folds. Removed mepilex d/t incontinence. Dr Jeff Murrieta rounded. Told to put insulin drip to 20 units/hr and leave there until BS reaches <200. By 5075 bs 188. Went back to [employer]. Witnessed with Nia Fonseca. Updated mother via tc over the shift. Given remdisivir. Actemra. Decadron for COVID infection. Zoomed with mother, June Fam, brother.

## 2021-04-06 NOTE — PROGRESS NOTES
> 0.65* 1.0 1.72* 1.8*   GLUCOSE 306*  --  258*  --  130*  --     < > = values in this interval not displayed. PT/INR:  Recent Labs     04/06/21  0508   PROTIME 18.2*   INR 1.5     APTT:  Recent Labs     04/06/21  0508   APTT 44.7*     LIVER PROFILE:  Recent Labs     04/05/21  0030 04/05/21  0539 04/06/21  0509   AST 74* 80* 3,485*   ALT 51* 53* 853*   BILITOT 0.9* 0.8* 0.8*   ALKPHOS 140* 152* 176*       Imaging Last 24 Hours:  Cta Chest W Wo  (pe Study)    Result Date: 4/5/2021  CT PULMONARY ANGIOGRAM WITH INTRAVENOUS CONTRAST MEDIUM. REASON FOR EXAMINATION:  SHORTNESS OF BREATH TECHNIQUE: Helical CTA was performed through the chest utilizing 100 mL of Isovue-300 intravenous contrast.  Images were obtained with bolus tracking in order to opacify the pulmonary arteries. Thick section coronal MIP 3D reconstructions were performed  on a separate workstation. COMPARISON:none FINDINGS:  Study limited secondary to suboptimal bolus administration. Pulmonary arteries: No intraluminal filling defects, main, right, and left pulmonary arteries. Bilateral interlobar, segmental, and subsegmental branches cannot be assessed secondary to suboptimal bolus administration. Transverse diameter, main pulmonary  artery 4.0 cm at level pulmonary chill bifurcation. Thoracic aorta: Normal in course and caliber. Transverse diameter, thoracic aorta, 3.2 cm, at level of pulmonary arterial bifurcation. Cardiac Size: Normal. Pericardial effusion: None. Right lung: No nodules, masses, pleural effusion, pneumothorax. Diffuse, peripheral, groundglass opacities, right lung. Left lung: No nodules, masses, pleural effusion, pneumothorax. Diffuse, peripheral, groundglass opacities, left lung. Lymph nodes: No hilar, mediastinal, or axillary lymph node enlargement. Upper abdomen:Limited imaging upper abdomen shows reflux of contrast medium into inferior vena cava and hepatic veins. Musculoskeletal:No osteoblastic, and no osteolytic lesions. patient to be transferred to tertiary care facility in view of worsening of his overall condition reviewed nephrology infectious disease and pulmonary critical care notes    ).        Electronically signed by Blayne Christianson MD on 4/6/21 at 1:13 PM EDT

## 2021-04-06 NOTE — PROGRESS NOTES
1911 Preciado catheter irrigated with 250 cc cool normal saline for irrigation per order of Dr. Ronald Soliman. No difference in temp. noted. Pt has ice packs and cooling blankets x2 for elevated temp.

## 2021-04-07 ENCOUNTER — CARE COORDINATION (OUTPATIENT)
Dept: CASE MANAGEMENT | Age: 31
End: 2021-04-07

## 2021-04-07 NOTE — FLOWSHEET NOTE
1900- handoff report Audelia JARAMILLO    2000- assessment complete. Pt responds to pain. Pupils equal and reactive sluggish, bilateral lung sounds diminished. 2130-[facility]/ Lili Dang Rn telephoned for report. Report given    2145- Pt mother Ferny Mujica notified of Pt transfer to [address] [place] [address] arrived to ICU Unit. Pt prepared for transport. 2255- Pt departed ICU.    2300- waste 80 ml fentanyl Brent Faust RN witness.

## 2021-04-07 NOTE — CARE COORDINATION
[facility] Update     2021    Patient: Sil Chin Patient : 1990   MRN: [id number]  Reason for Admission: -2021 CV-19, Resp Failure  Discharge Date: 21 RARS: Readmission Risk Score: 17  Care Transitions       Care Transitions Post Acute Facility Update    Care Transitions Interventions  Post Acute Facility: [facility] Update

## 2021-07-30 ENCOUNTER — HOSPITAL ENCOUNTER (EMERGENCY)
Age: 31
Discharge: ANOTHER HOSPITAL | End: 2021-07-31
Payer: COMMERCIAL

## 2021-07-30 ENCOUNTER — APPOINTMENT (OUTPATIENT)
Dept: GENERAL RADIOLOGY | Age: 31
End: 2021-07-30
Payer: COMMERCIAL

## 2021-07-30 VITALS
SYSTOLIC BLOOD PRESSURE: 148 MMHG | WEIGHT: 315 LBS | DIASTOLIC BLOOD PRESSURE: 89 MMHG | BODY MASS INDEX: 36.45 KG/M2 | HEIGHT: 78 IN | OXYGEN SATURATION: 95 % | HEART RATE: 112 BPM | TEMPERATURE: 99.5 F | RESPIRATION RATE: 16 BRPM

## 2021-07-30 DIAGNOSIS — R00.0 TACHYCARDIA: ICD-10-CM

## 2021-07-30 DIAGNOSIS — E83.42 HYPOMAGNESEMIA: ICD-10-CM

## 2021-07-30 DIAGNOSIS — D72.829 LEUKOCYTOSIS, UNSPECIFIED TYPE: Primary | ICD-10-CM

## 2021-07-30 LAB
ALBUMIN SERPL-MCNC: 4.2 G/DL (ref 3.5–4.6)
ALP BLD-CCNC: 152 U/L (ref 35–104)
ALT SERPL-CCNC: 33 U/L (ref 0–41)
ANION GAP SERPL CALCULATED.3IONS-SCNC: 15 MEQ/L (ref 9–15)
AST SERPL-CCNC: 28 U/L (ref 0–40)
BASOPHILS ABSOLUTE: 0 K/UL (ref 0–0.2)
BASOPHILS RELATIVE PERCENT: 0.2 %
BILIRUB SERPL-MCNC: 0.4 MG/DL (ref 0.2–0.7)
BILIRUBIN URINE: NEGATIVE
BLOOD, URINE: NEGATIVE
BUN BLDV-MCNC: 10 MG/DL (ref 6–20)
CALCIUM SERPL-MCNC: 9.9 MG/DL (ref 8.5–9.9)
CHLORIDE BLD-SCNC: 98 MEQ/L (ref 95–107)
CLARITY: CLEAR
CO2: 23 MEQ/L (ref 20–31)
COLOR: YELLOW
CREAT SERPL-MCNC: 0.72 MG/DL (ref 0.7–1.2)
EOSINOPHILS ABSOLUTE: 0.1 K/UL (ref 0–0.7)
EOSINOPHILS RELATIVE PERCENT: 0.9 %
GFR AFRICAN AMERICAN: >60
GFR NON-AFRICAN AMERICAN: >60
GLOBULIN: 4 G/DL (ref 2.3–3.5)
GLUCOSE BLD-MCNC: 147 MG/DL (ref 60–115)
GLUCOSE BLD-MCNC: 233 MG/DL (ref 70–99)
GLUCOSE URINE: NEGATIVE MG/DL
HCT VFR BLD CALC: 41.5 % (ref 42–52)
HEMOGLOBIN: 13.7 G/DL (ref 14–18)
KETONES, URINE: NEGATIVE MG/DL
LACTIC ACID: 3 MMOL/L (ref 0.5–2.2)
LEUKOCYTE ESTERASE, URINE: NEGATIVE
LYMPHOCYTES ABSOLUTE: 1.6 K/UL (ref 1–4.8)
LYMPHOCYTES RELATIVE PERCENT: 9.9 %
MAGNESIUM: 1.6 MG/DL (ref 1.7–2.4)
MCH RBC QN AUTO: 28.9 PG (ref 27–31.3)
MCHC RBC AUTO-ENTMCNC: 32.9 % (ref 33–37)
MCV RBC AUTO: 87.7 FL (ref 80–100)
MONOCYTES ABSOLUTE: 0.7 K/UL (ref 0.2–0.8)
MONOCYTES RELATIVE PERCENT: 4.6 %
NEUTROPHILS ABSOLUTE: 13.7 K/UL (ref 1.4–6.5)
NEUTROPHILS RELATIVE PERCENT: 84.4 %
NITRITE, URINE: NEGATIVE
PDW BLD-RTO: 13.5 % (ref 11.5–14.5)
PERFORMED ON: ABNORMAL
PH UA: 5 (ref 5–9)
PLATELET # BLD: 372 K/UL (ref 130–400)
POTASSIUM SERPL-SCNC: 5 MEQ/L (ref 3.4–4.9)
PRO-BNP: 556 PG/ML
PROTEIN UA: NEGATIVE MG/DL
RBC # BLD: 4.74 M/UL (ref 4.7–6.1)
SARS-COV-2, NAAT: NOT DETECTED
SODIUM BLD-SCNC: 136 MEQ/L (ref 135–144)
SPECIFIC GRAVITY UA: 1.02 (ref 1–1.03)
TOTAL PROTEIN: 8.2 G/DL (ref 6.3–8)
URINE REFLEX TO CULTURE: NORMAL
UROBILINOGEN, URINE: 0.2 E.U./DL
WBC # BLD: 16.2 K/UL (ref 4.8–10.8)

## 2021-07-30 PROCEDURE — 87635 SARS-COV-2 COVID-19 AMP PRB: CPT

## 2021-07-30 PROCEDURE — 2580000003 HC RX 258: Performed by: PHYSICIAN ASSISTANT

## 2021-07-30 PROCEDURE — 83735 ASSAY OF MAGNESIUM: CPT

## 2021-07-30 PROCEDURE — 85025 COMPLETE CBC W/AUTO DIFF WBC: CPT

## 2021-07-30 PROCEDURE — 6370000000 HC RX 637 (ALT 250 FOR IP): Performed by: PHYSICIAN ASSISTANT

## 2021-07-30 PROCEDURE — 83880 ASSAY OF NATRIURETIC PEPTIDE: CPT

## 2021-07-30 PROCEDURE — 80053 COMPREHEN METABOLIC PANEL: CPT

## 2021-07-30 PROCEDURE — 36415 COLL VENOUS BLD VENIPUNCTURE: CPT

## 2021-07-30 PROCEDURE — 83605 ASSAY OF LACTIC ACID: CPT

## 2021-07-30 PROCEDURE — 81003 URINALYSIS AUTO W/O SCOPE: CPT

## 2021-07-30 PROCEDURE — 71045 X-RAY EXAM CHEST 1 VIEW: CPT

## 2021-07-30 PROCEDURE — 99285 EMERGENCY DEPT VISIT HI MDM: CPT

## 2021-07-30 RX ORDER — ACETAMINOPHEN 500 MG
1000 TABLET ORAL ONCE
Status: COMPLETED | OUTPATIENT
Start: 2021-07-30 | End: 2021-07-30

## 2021-07-30 RX ORDER — LANOLIN ALCOHOL/MO/W.PET/CERES
400 CREAM (GRAM) TOPICAL DAILY
Status: DISCONTINUED | OUTPATIENT
Start: 2021-07-30 | End: 2021-07-31 | Stop reason: HOSPADM

## 2021-07-30 RX ORDER — 0.9 % SODIUM CHLORIDE 0.9 %
1000 INTRAVENOUS SOLUTION INTRAVENOUS ONCE
Status: COMPLETED | OUTPATIENT
Start: 2021-07-30 | End: 2021-07-31

## 2021-07-30 RX ORDER — LOSARTAN POTASSIUM 100 MG/1
100 TABLET ORAL DAILY
COMMUNITY

## 2021-07-30 RX ORDER — ATORVASTATIN CALCIUM 10 MG/1
1 TABLET, FILM COATED ORAL NIGHTLY
COMMUNITY
Start: 2021-07-04

## 2021-07-30 RX ADMIN — Medication 400 MG: at 21:00

## 2021-07-30 RX ADMIN — ACETAMINOPHEN 1000 MG: 500 TABLET ORAL at 18:04

## 2021-07-30 RX ADMIN — SODIUM CHLORIDE 1000 ML: 9 INJECTION, SOLUTION INTRAVENOUS at 18:04

## 2021-07-30 ASSESSMENT — ENCOUNTER SYMPTOMS
APNEA: 0
EYE DISCHARGE: 0
ABDOMINAL PAIN: 0
PHOTOPHOBIA: 0
ANAL BLEEDING: 0
VOICE CHANGE: 0
VOMITING: 0
ABDOMINAL DISTENTION: 0
NAUSEA: 0
SHORTNESS OF BREATH: 0
BACK PAIN: 0
COUGH: 0
COLOR CHANGE: 0

## 2021-07-30 ASSESSMENT — PAIN SCALES - GENERAL: PAINLEVEL_OUTOF10: 0

## 2021-07-30 NOTE — ED PROVIDER NOTES
[facility] ED  eMERGENCY dEPARTMENT eNCOUnter      Pt Name: Jim Valdivia  MRN: [id number]  [place] 1990  Date of evaluation: 7/30/2021  Provider: Lucio Valentino Dr       Chief Complaint   Patient presents with    Urinary Tract Infection         HISTORY OF PRESENT ILLNESS   (Location/Symptom, Timing/Onset,Context/Setting, Quality, Duration, Modifying Factors, Severity)  Note limiting factors. Jim Valdivia is a 27 y.o. male who presents to the emergency department   Pt presents with feeling ill/ possible uti.  sts he has felt hot/cold today. Dark urine. He sts his knee gave out walking down stairs. He denies headache/ knee pain, back pain, neck pain, hhip pain, abdominal pain, chest pain. He denies any injury from fall. Symptoms moderate in severity, nothing improves or worsens sympotms. HPI    NursingNotes were reviewed. REVIEW OF SYSTEMS    (2-9 systems for level 4, 10 or more for level 5)     Review of Systems   Constitutional: Positive for chills and fever. Negative for activity change, appetite change and unexpected weight change. HENT: Negative for ear discharge, nosebleeds and voice change. Eyes: Negative for photophobia and discharge. Respiratory: Negative for apnea, cough and shortness of breath. Cardiovascular: Negative for chest pain. Gastrointestinal: Negative for abdominal distention, abdominal pain, anal bleeding, nausea and vomiting. Endocrine: Negative for cold intolerance, heat intolerance and polyphagia. Genitourinary: Positive for frequency. Negative for dysuria and genital sores. Musculoskeletal: Negative for back pain, joint swelling, neck pain and neck stiffness. Skin: Negative for color change and pallor. Allergic/Immunologic: Negative for immunocompromised state. Neurological: Negative for seizures and facial asymmetry. Hematological: Does not bruise/bleed easily.    Psychiatric/Behavioral: Negative for behavioral problems, self-injury and sleep disturbance. All other systems reviewed and are negative. Except as noted above the remainder of the review of systems was reviewed and negative. PAST MEDICAL HISTORY   No past medical history on file. SURGICALHISTORY     No past surgical history on file. CURRENT MEDICATIONS       Previous Medications    LOSARTAN (COZAAR) 100 MG TABLET    Take 100 mg by mouth daily    METFORMIN (GLUCOPHAGE) 500 MG TABLET    Take 500 mg by mouth 2 times daily (with meals)       ALLERGIES     Patient has no known allergies. FAMILY HISTORY     No family history on file. SOCIAL HISTORY       Social History     Socioeconomic History    Marital status: Single     Spouse name: Not on file    Number of children: Not on file    Years of education: Not on file    Highest education level: Not on file   Occupational History    Not on file   Tobacco Use    Smoking status: Never Smoker    Smokeless tobacco: Never Used   Substance and Sexual Activity    Alcohol use: No    Drug use: No    Sexual activity: Not on file   Other Topics Concern    Not on file   Social History Narrative    Not on file     Social Determinants of Health     Financial Resource Strain:     Difficulty of Paying Living Expenses:    Food Insecurity:     Worried About Running Out of Food in the Last Year:     [address] in the Last Year:    Transportation Needs:     Lack of Transportation (Medical):      Lack of Transportation (Non-Medical):    Physical Activity:     Days of Exercise per Week:     Minutes of Exercise per Session:    Stress:     Feeling of Stress :    Social Connections:     Frequency of Communication with Friends and Family:     Frequency of Social Gatherings with Friends and Family:     Attends [employer] Services:     Active Member of Clubs or Organizations:     Attends Club or Organization Meetings:     Marital Status:    Intimate Partner Violence:     Fear of Current or Ex-Partner:     Emotionally Abused:     Physically Abused:     Sexually Abused:        SCREENINGS      @FLOW([id number])@      PHYSICAL EXAM    (up to 7 for level 4, 8 or more for level 5)     ED Triage Vitals [07/30/21 1721]   BP Temp Temp Source Pulse Resp SpO2 Height Weight   134/76 99.5 °F (37.5 °C) Oral 118 18 95 % 6' 8\" (2.032 m) (!) 530 lb (240.4 kg)       Physical Exam  Vitals and nursing note reviewed. Constitutional:       General: He is not in acute distress. Appearance: He is well-developed. He is obese. He is not ill-appearing. HENT:      Head: Normocephalic and atraumatic. Right Ear: Tympanic membrane and external ear normal.      Left Ear: Tympanic membrane and external ear normal.      Nose: Nose normal.      Mouth/Throat:      Mouth: Mucous membranes are moist.   Eyes:      General:         Right eye: No discharge. Left eye: No discharge. Extraocular Movements: Extraocular movements intact. Pupils: Pupils are equal, round, and reactive to light. Cardiovascular:      Rate and Rhythm: Regular rhythm. Tachycardia present. Pulses: Normal pulses. Heart sounds: Normal heart sounds. Pulmonary:      Effort: Pulmonary effort is normal. No respiratory distress. Breath sounds: Normal breath sounds. No stridor. Abdominal:      General: Bowel sounds are normal. There is no distension. Palpations: Abdomen is soft. Tenderness: There is no abdominal tenderness. Musculoskeletal:         General: Normal range of motion. Cervical back: Normal range of motion and neck supple. No rigidity or tenderness. Right lower leg: Edema present. Left lower leg: Edema present. Comments: Negative tender including hips/legs/ knees. Skin:     General: Skin is warm. Findings: No erythema. Neurological:      Mental Status: He is alert and oriented to person, place, and time.    Psychiatric:         Mood and Affect: Mood normal.         DIAGNOSTIC RESULTS     EKG: All EKG's are interpreted by the Emergency Department Physician who either signs or Co-signsthis chart in the absence of a cardiologist.         RADIOLOGY:   Charma Bourdon such as CT, Ultrasound and MRI are read by the radiologist. Plain radiographic images are visualized and preliminarily interpreted by the emergency physician with the below findings:     neg        Interpretation per the Radiologist below, if available at the time ofthis note:    XR CHEST PORTABLE    (Results Pending)         ED BEDSIDE ULTRASOUND:   Performed by ED Physician - none    LABS:  Labs Reviewed   COMPREHENSIVE METABOLIC PANEL - Abnormal; Notable for the following components:       Result Value    Potassium 5.0 (*)     Glucose 233 (*)     Total Protein 8.2 (*)     Alkaline Phosphatase 152 (*)     Globulin 4.0 (*)     All other components within normal limits   CBC WITH AUTO DIFFERENTIAL - Abnormal; Notable for the following components:    WBC 16.2 (*)     Hemoglobin 13.7 (*)     Hematocrit 41.5 (*)     MCHC 32.9 (*)     Neutrophils Absolute 13.7 (*)     All other components within normal limits   LACTIC ACID, PLASMA - Abnormal; Notable for the following components:    Lactic Acid 3.0 (*)     All other components within normal limits    Narrative:     Sanju Soliman  [facility] tel. [phone],  Lactic Acid results called to and read back by Gorge Hernandez RN, 07/30/2021  18:46, by [address] - Abnormal; Notable for the following components:    Magnesium 1.6 (*)     All other components within normal limits   COVID-19, RAPID   CULTURE, BLOOD 1   CULTURE, BLOOD 2   URINE RT REFLEX TO CULTURE   BRAIN NATRIURETIC PEPTIDE   LACTIC ACID, PLASMA       All other labs were within normal range or not returned as of this dictation.     EMERGENCY DEPARTMENT COURSE and DIFFERENTIAL DIAGNOSIS/MDM:   Vitals:    Vitals:    07/30/21 1721 07/30/21 1827 07/30/21 1931 07/30/21 2052   BP: 134/76 117/79 114/74 126/77   Pulse: 118 107 111 108   Resp: 18 16 16 16   Temp: 99.5 °F (37.5 °C)      TempSrc: Oral      SpO2: 95% 98% 96% 96%   Weight: (!) 530 lb (240.4 kg)      Height: 6' 8\" (2.032 m)               MDM  Number of Diagnoses or Management Options  Diagnosis management comments: MIRNA Lazcano. Discussed with Dr. Liliana Loya, he  cannot accept patient secondary bariatric status. Transfer discussed with Dr. Luther Orourke at [facility] he accepts patient. We discussed patient's presentation history labs vital signs. He requests repeat lactic acid after fluids are done. Amount and/or Complexity of Data Reviewed  Clinical lab tests: reviewed  Tests in the radiology section of CPT®: reviewed and ordered  Discuss the patient with other providers: yes        CRITICAL CARE TIME       CONSULTS:  None    PROCEDURES:  Unless otherwise noted below, none     Procedures    FINAL IMPRESSION      1. Leukocytosis, unspecified type    2. Tachycardia    3. Hypomagnesemia          DISPOSITION/PLAN   DISPOSITION Decision To Transfer 07/30/2021 08:34:05 PM      PATIENT REFERRED TO:  No follow-up provider specified.     DISCHARGE MEDICATIONS:  New Prescriptions    No medications on file          (Please note that portions of this note were completed with a voice recognition program.  Efforts were made to edit the dictations but occasionally words are mis-transcribed.)    Carmelo Hyde PA-C (electronically signed)  Attending Emergency Physician       Carmelo Hyde PA-C  07/30/21 [address] [place], [place] [address]ANTIONE  07/30/21 [address] [place] [address], ANTIONE  07/30/21 3712

## 2021-07-31 LAB — LACTIC ACID: 2.5 MMOL/L (ref 0.5–2.2)

## 2021-07-31 PROCEDURE — 83605 ASSAY OF LACTIC ACID: CPT

## 2021-07-31 PROCEDURE — 36415 COLL VENOUS BLD VENIPUNCTURE: CPT

## 2021-07-31 PROCEDURE — 87040 BLOOD CULTURE FOR BACTERIA: CPT

## 2021-07-31 NOTE — ED NOTES
Wilfrid Santos called and stated that the [facility] will be on their way will let the PA and RN know      Jess Georgetown A Page  07/31/21 0038

## 2021-07-31 NOTE — ED NOTES
[facility] ([facility]) arrived to  patient, they stated that they were unable to transport due to the size and weight of patient. Informed ROSS Holman of this. I called [facility] to set up transportation spoke to Arianna Sweet who stated that he was going to run it by his  and would call me back.      Maite Duran A Page  07/31/21 0025

## 2021-07-31 NOTE — ED NOTES
Patient resting in bed, eating sandwich.    VS updated and pt is aware that  time for [facility] transfer is at [address]  07/30/21 5437

## 2021-08-05 LAB
BLOOD CULTURE, ROUTINE: NORMAL
CULTURE, BLOOD 2: NORMAL

## 2024-07-01 ENCOUNTER — HOSPITAL ENCOUNTER (EMERGENCY)
Age: 34
Discharge: HOME | End: 2024-07-01
Payer: COMMERCIAL

## 2024-07-01 ENCOUNTER — APPOINTMENT (OUTPATIENT)
Dept: GENERAL RADIOLOGY | Age: 34
End: 2024-07-01
Payer: COMMERCIAL

## 2024-07-01 VITALS
RESPIRATION RATE: 19 BRPM | OXYGEN SATURATION: 99 % | WEIGHT: 315 LBS | SYSTOLIC BLOOD PRESSURE: 150 MMHG | HEIGHT: 75 IN | BODY MASS INDEX: 39.17 KG/M2 | DIASTOLIC BLOOD PRESSURE: 90 MMHG | HEART RATE: 101 BPM | TEMPERATURE: 98 F

## 2024-07-01 DIAGNOSIS — L03.116 CELLULITIS OF LEFT LOWER EXTREMITY: Primary | ICD-10-CM

## 2024-07-01 DIAGNOSIS — E11.65 TYPE 2 DIABETES MELLITUS WITH HYPERGLYCEMIA, WITHOUT LONG-TERM CURRENT USE OF INSULIN (HCC): ICD-10-CM

## 2024-07-01 LAB
ALBUMIN SERPL-MCNC: 4.1 G/DL (ref 3.5–4.6)
ALP SERPL-CCNC: 140 U/L (ref 35–104)
ALT SERPL-CCNC: 69 U/L (ref 0–41)
ANION GAP SERPL CALCULATED.3IONS-SCNC: 12 MEQ/L (ref 9–15)
AST SERPL-CCNC: 52 U/L (ref 0–40)
BASOPHILS # BLD: 0 K/UL (ref 0–0.2)
BASOPHILS NFR BLD: 0.3 %
BILIRUB SERPL-MCNC: 0.4 MG/DL (ref 0.2–0.7)
BUN SERPL-MCNC: 19 MG/DL (ref 6–20)
CALCIUM SERPL-MCNC: 9.5 MG/DL (ref 8.5–9.9)
CHLORIDE SERPL-SCNC: 101 MEQ/L (ref 95–107)
CO2 SERPL-SCNC: 22 MEQ/L (ref 20–31)
CREAT SERPL-MCNC: 0.69 MG/DL (ref 0.7–1.2)
EOSINOPHIL # BLD: 0.3 K/UL (ref 0–0.7)
EOSINOPHIL NFR BLD: 2.9 %
ERYTHROCYTE [DISTWIDTH] IN BLOOD BY AUTOMATED COUNT: 14.6 % (ref 11.5–14.5)
GLOBULIN SER CALC-MCNC: 3.7 G/DL (ref 2.3–3.5)
GLUCOSE SERPL-MCNC: 180 MG/DL (ref 70–99)
HCT VFR BLD AUTO: 45.1 % (ref 42–52)
HGB BLD-MCNC: 14.1 G/DL (ref 14–18)
LACTATE BLDV-SCNC: 2.1 MMOL/L (ref 0.5–2.2)
LYMPHOCYTES # BLD: 2.6 K/UL (ref 1–4.8)
LYMPHOCYTES NFR BLD: 21.6 %
MCH RBC QN AUTO: 27.1 PG (ref 27–31.3)
MCHC RBC AUTO-ENTMCNC: 31.3 % (ref 33–37)
MCV RBC AUTO: 86.7 FL (ref 79–92.2)
MONOCYTES # BLD: 0.8 K/UL (ref 0.2–0.8)
MONOCYTES NFR BLD: 6.3 %
NEUTROPHILS # BLD: 8.1 K/UL (ref 1.4–6.5)
NEUTS SEG NFR BLD: 68.6 %
PLATELET # BLD AUTO: 269 K/UL (ref 130–400)
POTASSIUM SERPL-SCNC: 4.6 MEQ/L (ref 3.4–4.9)
PROT SERPL-MCNC: 7.8 G/DL (ref 6.3–8)
RBC # BLD AUTO: 5.2 M/UL (ref 4.7–6.1)
SODIUM SERPL-SCNC: 135 MEQ/L (ref 135–144)
WBC # BLD AUTO: 11.9 K/UL (ref 4.8–10.8)

## 2024-07-01 PROCEDURE — 80053 COMPREHEN METABOLIC PANEL: CPT

## 2024-07-01 PROCEDURE — 36415 COLL VENOUS BLD VENIPUNCTURE: CPT

## 2024-07-01 PROCEDURE — 85025 COMPLETE CBC W/AUTO DIFF WBC: CPT

## 2024-07-01 PROCEDURE — 99284 EMERGENCY DEPT VISIT MOD MDM: CPT

## 2024-07-01 PROCEDURE — 83605 ASSAY OF LACTIC ACID: CPT

## 2024-07-01 PROCEDURE — 73590 X-RAY EXAM OF LOWER LEG: CPT

## 2024-07-01 RX ORDER — CEPHALEXIN 500 MG/1
500 CAPSULE ORAL 4 TIMES DAILY
Qty: 28 CAPSULE | Refills: 0 | Status: SHIPPED | OUTPATIENT
Start: 2024-07-01 | End: 2024-07-08

## 2024-07-01 RX ORDER — SULFAMETHOXAZOLE AND TRIMETHOPRIM 800; 160 MG/1; MG/1
1 TABLET ORAL 2 TIMES DAILY
Qty: 14 TABLET | Refills: 0 | Status: SHIPPED | OUTPATIENT
Start: 2024-07-01 | End: 2024-07-08

## 2024-07-01 ASSESSMENT — PAIN SCALES - GENERAL: PAINLEVEL_OUTOF10: 2

## 2024-07-01 ASSESSMENT — ENCOUNTER SYMPTOMS
ABDOMINAL DISTENTION: 0
VOICE CHANGE: 0
COLOR CHANGE: 1
EYE DISCHARGE: 0
APNEA: 0
ANAL BLEEDING: 0

## 2024-07-01 ASSESSMENT — PAIN DESCRIPTION - PAIN TYPE: TYPE: ACUTE PAIN

## 2024-07-01 ASSESSMENT — PAIN - FUNCTIONAL ASSESSMENT
PAIN_FUNCTIONAL_ASSESSMENT: 0-10
PAIN_FUNCTIONAL_ASSESSMENT: NONE - DENIES PAIN

## 2024-07-01 ASSESSMENT — PAIN DESCRIPTION - LOCATION: LOCATION: LEG

## 2024-07-01 ASSESSMENT — LIFESTYLE VARIABLES
HOW MANY STANDARD DRINKS CONTAINING ALCOHOL DO YOU HAVE ON A TYPICAL DAY: PATIENT DOES NOT DRINK
HOW OFTEN DO YOU HAVE A DRINK CONTAINING ALCOHOL: NEVER

## 2024-07-01 ASSESSMENT — PAIN DESCRIPTION - ORIENTATION: ORIENTATION: LEFT

## 2024-07-02 NOTE — ED PROVIDER NOTES
[facility] ED  EMERGENCY DEPARTMENT ENCOUNTER      Pt Name: Attila Salinas  MRN: [id number]  Birthdate 1990  Date of evaluation: 7/1/2024  Provider: Ryne Jones PA-C  8:43 PM EDT    CHIEF COMPLAINT       Chief Complaint   Patient presents with    Leg Pain     Left leg            HISTORY OF PRESENT ILLNESS   (Location/Symptom, Timing/Onset, Context/Setting, Quality, Duration, Modifying Factors, Severity)  Note limiting factors.   Attila Salinas is a 34 y.o. male who presents to the emergency department patient has left leg pain redness and bruising states started a few days ago results while scratching center of the bruised area.  Does have some drainage at the site.  Denies fever chills nausea vomiting chest pain shortness of breath.  Denies any blood thinner use.  Patient was seen by urgent care telemedicine and started on doxycycline and mupirocin.  Symptoms mild to moderate severity.    HPI    Nursing Notes were reviewed.    REVIEW OF SYSTEMS    (2-9 systems for level 4, 10 or more for level 5)     Review of Systems   Constitutional:  Negative for chills and fever.   HENT:  Negative for ear discharge, nosebleeds and voice change.    Eyes:  Negative for discharge.   Respiratory:  Negative for apnea.    Cardiovascular:  Negative for chest pain.   Gastrointestinal:  Negative for abdominal distention and anal bleeding.   Genitourinary:  Negative for hematuria.   Skin:  Positive for color change and wound. Negative for pallor and rash.   Neurological:  Negative for seizures and facial asymmetry.   Hematological:  Does not bruise/bleed easily.   Psychiatric/Behavioral:  Negative for self-injury.    All other systems reviewed and are negative.      Except as noted above the remainder of the review of systems was reviewed and negative.       PAST MEDICAL HISTORY     Past Medical History:   Diagnosis Date    CHF (congestive heart failure) (HCC)     Diabetes mellitus (HCC)     Hyperlipidemia

## 2024-07-02 NOTE — DISCHARGE INSTRUCTIONS
Discontinue doxycycline.  Follow-up with primary care return to if any symptoms worsen or new symptoms develop   This [facility] message has been forwarded to you from a monitored pool.  Please do not reply to this message.  All responses should be sent directly to the patient.

## 2024-12-14 ENCOUNTER — APPOINTMENT (OUTPATIENT)
Dept: GENERAL RADIOLOGY | Age: 34
End: 2024-12-14
Payer: COMMERCIAL

## 2024-12-14 ENCOUNTER — HOSPITAL ENCOUNTER (EMERGENCY)
Age: 34
Discharge: HOME | End: 2024-12-14
Attending: EMERGENCY MEDICINE
Payer: COMMERCIAL

## 2024-12-14 VITALS
TEMPERATURE: 97.5 F | OXYGEN SATURATION: 94 % | DIASTOLIC BLOOD PRESSURE: 99 MMHG | HEIGHT: 78 IN | WEIGHT: 315 LBS | BODY MASS INDEX: 36.45 KG/M2 | HEART RATE: 97 BPM | RESPIRATION RATE: 20 BRPM | SYSTOLIC BLOOD PRESSURE: 141 MMHG

## 2024-12-14 DIAGNOSIS — B34.8 RHINOVIRUS: Primary | ICD-10-CM

## 2024-12-14 DIAGNOSIS — E66.01 MORBID OBESITY: ICD-10-CM

## 2024-12-14 LAB
ALBUMIN SERPL-MCNC: 3.9 G/DL (ref 3.5–4.6)
ALP SERPL-CCNC: 144 U/L (ref 35–104)
ALT SERPL-CCNC: 22 U/L (ref 0–41)
ANION GAP SERPL CALCULATED.3IONS-SCNC: 13 MEQ/L (ref 9–15)
AST SERPL-CCNC: 20 U/L (ref 0–40)
B PARAP IS1001 DNA NPH QL NAA+NON-PROBE: NOT DETECTED
B PERT.PT PRMT NPH QL NAA+NON-PROBE: NOT DETECTED
BASOPHILS # BLD: 0 K/UL (ref 0–0.2)
BASOPHILS NFR BLD: 0.2 %
BILIRUB SERPL-MCNC: 0.5 MG/DL (ref 0.2–0.7)
BUN SERPL-MCNC: 10 MG/DL (ref 6–20)
C PNEUM DNA NPH QL NAA+NON-PROBE: NOT DETECTED
CALCIUM SERPL-MCNC: 8.7 MG/DL (ref 8.5–9.9)
CHLORIDE SERPL-SCNC: 98 MEQ/L (ref 95–107)
CO2 SERPL-SCNC: 25 MEQ/L (ref 20–31)
CREAT SERPL-MCNC: 0.66 MG/DL (ref 0.7–1.2)
EOSINOPHIL # BLD: 0.3 K/UL (ref 0–0.7)
EOSINOPHIL NFR BLD: 2.1 %
ERYTHROCYTE [DISTWIDTH] IN BLOOD BY AUTOMATED COUNT: 14.6 % (ref 11.5–14.5)
FLUAV RNA NPH QL NAA+NON-PROBE: NOT DETECTED
FLUBV RNA NPH QL NAA+NON-PROBE: NOT DETECTED
GLOBULIN SER CALC-MCNC: 3.4 G/DL (ref 2.3–3.5)
GLUCOSE SERPL-MCNC: 227 MG/DL (ref 70–99)
HADV DNA NPH QL NAA+NON-PROBE: NOT DETECTED
HCOV 229E RNA NPH QL NAA+NON-PROBE: NOT DETECTED
HCOV HKU1 RNA NPH QL NAA+NON-PROBE: NOT DETECTED
HCOV NL63 RNA NPH QL NAA+NON-PROBE: NOT DETECTED
HCOV OC43 RNA NPH QL NAA+NON-PROBE: NOT DETECTED
HCT VFR BLD AUTO: 43.6 % (ref 42–52)
HGB BLD-MCNC: 13.9 G/DL (ref 14–18)
HMPV RNA NPH QL NAA+NON-PROBE: NOT DETECTED
HPIV1 RNA NPH QL NAA+NON-PROBE: NOT DETECTED
HPIV2 RNA NPH QL NAA+NON-PROBE: NOT DETECTED
HPIV3 RNA NPH QL NAA+NON-PROBE: NOT DETECTED
HPIV4 RNA NPH QL NAA+NON-PROBE: NOT DETECTED
LACTIC ACID, SEPSIS: 3.2 MMOL/L (ref 0.5–1.9)
LYMPHOCYTES # BLD: 2 K/UL (ref 1–4.8)
LYMPHOCYTES NFR BLD: 16 %
M PNEUMO DNA NPH QL NAA+NON-PROBE: NOT DETECTED
MCH RBC QN AUTO: 26.9 PG (ref 27–31.3)
MCHC RBC AUTO-ENTMCNC: 31.9 % (ref 33–37)
MCV RBC AUTO: 84.3 FL (ref 79–92.2)
MONOCYTES # BLD: 0.7 K/UL (ref 0.2–0.8)
MONOCYTES NFR BLD: 5.1 %
NEUTROPHILS # BLD: 9.6 K/UL (ref 1.4–6.5)
NEUTS SEG NFR BLD: 76 %
PLATELET # BLD AUTO: 283 K/UL (ref 130–400)
POTASSIUM SERPL-SCNC: 4.6 MEQ/L (ref 3.4–4.9)
PROCALCITONIN SERPL IA-MCNC: 0.06 NG/ML (ref 0–0.15)
PROT SERPL-MCNC: 7.3 G/DL (ref 6.3–8)
RBC # BLD AUTO: 5.17 M/UL (ref 4.7–6.1)
RSV RNA NPH QL NAA+NON-PROBE: NOT DETECTED
RV+EV RNA NPH QL NAA+NON-PROBE: DETECTED
SARS-COV-2 RNA NPH QL NAA+NON-PROBE: NOT DETECTED
SODIUM SERPL-SCNC: 136 MEQ/L (ref 135–144)
WBC # BLD AUTO: 12.7 K/UL (ref 4.8–10.8)

## 2024-12-14 PROCEDURE — 36415 COLL VENOUS BLD VENIPUNCTURE: CPT

## 2024-12-14 PROCEDURE — 71046 X-RAY EXAM CHEST 2 VIEWS: CPT

## 2024-12-14 PROCEDURE — 99284 EMERGENCY DEPT VISIT MOD MDM: CPT

## 2024-12-14 PROCEDURE — 80053 COMPREHEN METABOLIC PANEL: CPT

## 2024-12-14 PROCEDURE — 84145 PROCALCITONIN (PCT): CPT

## 2024-12-14 PROCEDURE — 94640 AIRWAY INHALATION TREATMENT: CPT

## 2024-12-14 PROCEDURE — 83036 HEMOGLOBIN GLYCOSYLATED A1C: CPT

## 2024-12-14 PROCEDURE — 6370000000 HC RX 637 (ALT 250 FOR IP): Performed by: EMERGENCY MEDICINE

## 2024-12-14 PROCEDURE — 83605 ASSAY OF LACTIC ACID: CPT

## 2024-12-14 PROCEDURE — 0202U NFCT DS 22 TRGT SARS-COV-2: CPT

## 2024-12-14 PROCEDURE — 87040 BLOOD CULTURE FOR BACTERIA: CPT

## 2024-12-14 PROCEDURE — 85025 COMPLETE CBC W/AUTO DIFF WBC: CPT

## 2024-12-14 RX ORDER — IPRATROPIUM BROMIDE AND ALBUTEROL SULFATE 2.5; .5 MG/3ML; MG/3ML
1 SOLUTION RESPIRATORY (INHALATION) PRN
Status: DISCONTINUED | OUTPATIENT
Start: 2024-12-14 | End: 2024-12-14 | Stop reason: HOSPADM

## 2024-12-14 RX ORDER — ALBUTEROL SULFATE 90 UG/1
2 INHALANT RESPIRATORY (INHALATION) 4 TIMES DAILY PRN
Qty: 18 G | Refills: 0 | Status: SHIPPED | OUTPATIENT
Start: 2024-12-14

## 2024-12-14 RX ADMIN — IPRATROPIUM BROMIDE AND ALBUTEROL SULFATE 1 DOSE: 2.5; .5 SOLUTION RESPIRATORY (INHALATION) at 10:59

## 2024-12-14 RX ADMIN — IPRATROPIUM BROMIDE AND ALBUTEROL SULFATE 1 DOSE: 2.5; .5 SOLUTION RESPIRATORY (INHALATION) at 11:01

## 2024-12-14 RX ADMIN — IPRATROPIUM BROMIDE AND ALBUTEROL SULFATE 1 DOSE: 2.5; .5 SOLUTION RESPIRATORY (INHALATION) at 10:58

## 2024-12-14 ASSESSMENT — ENCOUNTER SYMPTOMS
SHORTNESS OF BREATH: 1
CHEST TIGHTNESS: 0
EYE PAIN: 0
VOMITING: 0
NAUSEA: 0
SORE THROAT: 0
COUGH: 1
ABDOMINAL PAIN: 0

## 2024-12-14 ASSESSMENT — PAIN - FUNCTIONAL ASSESSMENT
PAIN_FUNCTIONAL_ASSESSMENT: NONE - DENIES PAIN
PAIN_FUNCTIONAL_ASSESSMENT: 0-10

## 2024-12-14 ASSESSMENT — PAIN SCALES - GENERAL: PAINLEVEL_OUTOF10: 0

## 2024-12-14 NOTE — ED PROVIDER NOTES
Psychiatric/Behavioral:  Negative for confusion and sleep disturbance.        Except as noted above the remainder of the review of systems was reviewed and negative.       PAST MEDICAL HISTORY     Past Medical History:   Diagnosis Date    CHF (congestive heart failure) (HCC)     Diabetes mellitus (HCC)     Hyperlipidemia     Hypertension          SURGICAL HISTORY     History reviewed. No pertinent surgical history.      CURRENT MEDICATIONS       Previous Medications    ATORVASTATIN (LIPITOR) 10 MG TABLET    Take 1 tablet by mouth nightly    LOSARTAN (COZAAR) 100 MG TABLET    Take 100 mg by mouth daily    METFORMIN (GLUCOPHAGE) 500 MG TABLET    Take 500 mg by mouth 2 times daily (with meals)       ALLERGIES     Patient has no known allergies.    FAMILY HISTORY     History reviewed. No pertinent family history.       SOCIAL HISTORY       Social History     Socioeconomic History    Marital status: Single     Spouse name: None    Number of children: None    Years of education: None    Highest education level: None   Tobacco Use    Smoking status: Never    Smokeless tobacco: Never   Vaping Use    Vaping status: Never Used   Substance and Sexual Activity    Alcohol use: No    Drug use: No    Sexual activity: Yes     Partners: Female     Social Determinants of Health     Financial Resource Strain: Low Risk  (3/29/2024)    Received from [facility]    Overall Financial Resource Strain (CARDIA)     Difficulty of Paying Living Expenses: Not hard at all   Food Insecurity: No Food Insecurity (3/29/2024)    Received from [facility]    Hunger Vital Sign     Worried About Running Out of Food in the Last Year: Never true     Ran Out of Food in the Last Year: Never true   Transportation Needs: Unmet Transportation Needs (3/29/2024)    Received from [facility]    PRAPARE - Transportation     Lack of Transportation (Medical): Yes     Lack of Transportation  out of control.  Blood sugar today 227.  Hemoglobin A1c pending.  Patient is on metformin 1000 mg twice daily.  I believe at this point patient should see endocrinology.  He tells me that he put on a lot of weight after having COVID because he developed neuropathy in his feet that prevent him from walking very much.  Patient is agreeable to follow-up with endocrinology and I have provided phone number of .  Symptomatic care for his rhinovirus.  Will send inhaler to his pharmacy    Medical Decision Making  Amount and/or Complexity of Data Reviewed  Labs: ordered.  Radiology: ordered.    Risk  Prescription drug management.            REASSESSMENT          CRITICAL CARE TIME   None    CONSULTS:  None    PROCEDURES:  Unless otherwise noted below, none     Procedures      FINAL IMPRESSION      1. Rhinovirus    2. Morbid obesity          DISPOSITION/PLAN   DISPOSITION Decision To Discharge 12/14/2024 11:52:45 AM      PATIENT REFERRED TO:  Ra Cruz MD  [address] [place] [address]  [phone]    Schedule an appointment as soon as possible for a visit   for your diabetes      DISCHARGE MEDICATIONS:  New Prescriptions    ALBUTEROL SULFATE HFA (VENTOLIN HFA) 108 (90 BASE) MCG/ACT INHALER    Inhale 2 puffs into the lungs 4 times daily as needed for Wheezing     Controlled Substances Monitoring:          No data to display                (Please note that portions of this note were completed with a voice recognition program.  Efforts were made to edit the dictations but occasionally words are mis-transcribed.)    Raina Calvo DO (electronically signed)  Attending Emergency Physician    Supervising Attending Physician: Dr. Calvo.       Raina Calvo DO  12/14/24 1918

## 2024-12-14 NOTE — ED TRIAGE NOTES
Pt c/o sob, cough x past 3 days, pt states his O2 sats. dropped to 87% on ra today. Was advised by his  To go to er. Pt a&ox4, skin w/d/pink, sats. in triage noted 93% on ra while sitting, 0 cough noted at this time, pt has steady gait, but feels more sob while walking per pt.

## 2024-12-15 LAB
BACTERIA BLD CULT ORG #2: NORMAL
BACTERIA BLD CULT: NORMAL
ESTIMATED AVERAGE GLUCOSE: 246 MG/DL
HBA1C MFR BLD: 10.2 % (ref 4–6)

## 2024-12-19 LAB
BACTERIA BLD CULT ORG #2: NORMAL
BACTERIA BLD CULT: NORMAL

## 2025-01-28 ENCOUNTER — HOSPITAL ENCOUNTER (INPATIENT)
Age: 35
LOS: 1 days | Discharge: HOME | DRG: 137 | End: 2025-01-30
Attending: INTERNAL MEDICINE | Admitting: INTERNAL MEDICINE
Payer: COMMERCIAL

## 2025-01-28 ENCOUNTER — APPOINTMENT (OUTPATIENT)
Dept: GENERAL RADIOLOGY | Age: 35
DRG: 137 | End: 2025-01-28
Payer: COMMERCIAL

## 2025-01-28 DIAGNOSIS — J12.82 PNEUMONIA DUE TO COVID-19 VIRUS: Primary | ICD-10-CM

## 2025-01-28 DIAGNOSIS — R09.02 HYPOXEMIA: ICD-10-CM

## 2025-01-28 DIAGNOSIS — R05.8 PRODUCTIVE COUGH: ICD-10-CM

## 2025-01-28 DIAGNOSIS — U07.1 PNEUMONIA DUE TO COVID-19 VIRUS: Primary | ICD-10-CM

## 2025-01-28 DIAGNOSIS — U07.1 COVID-19: ICD-10-CM

## 2025-01-28 LAB
ALBUMIN SERPL-MCNC: 3.9 G/DL (ref 3.5–4.6)
ALP SERPL-CCNC: 139 U/L (ref 35–104)
ALT SERPL-CCNC: 20 U/L (ref 0–41)
ANION GAP SERPL CALCULATED.3IONS-SCNC: 13 MEQ/L (ref 9–15)
AST SERPL-CCNC: 19 U/L (ref 0–40)
BASOPHILS # BLD: 0 K/UL (ref 0–0.2)
BASOPHILS NFR BLD: 0.3 %
BILIRUB SERPL-MCNC: 1 MG/DL (ref 0.2–0.7)
BUN SERPL-MCNC: 11 MG/DL (ref 6–20)
CALCIUM SERPL-MCNC: 8.8 MG/DL (ref 8.5–9.9)
CHLORIDE SERPL-SCNC: 96 MEQ/L (ref 95–107)
CO2 SERPL-SCNC: 22 MEQ/L (ref 20–31)
CREAT SERPL-MCNC: 0.7 MG/DL (ref 0.7–1.2)
EOSINOPHIL # BLD: 0.3 K/UL (ref 0–0.7)
EOSINOPHIL NFR BLD: 2.8 %
ERYTHROCYTE [DISTWIDTH] IN BLOOD BY AUTOMATED COUNT: 14.5 % (ref 11.5–14.5)
GLOBULIN SER CALC-MCNC: 3.5 G/DL (ref 2.3–3.5)
GLUCOSE SERPL-MCNC: 215 MG/DL (ref 70–99)
HCT VFR BLD AUTO: 41.7 % (ref 42–52)
HGB BLD-MCNC: 13.4 G/DL (ref 14–18)
INFLUENZA A BY PCR: NEGATIVE
INFLUENZA B BY PCR: NEGATIVE
LACTATE BLDV-SCNC: 2.5 MMOL/L (ref 0.5–2.2)
LYMPHOCYTES # BLD: 1.6 K/UL (ref 1–4.8)
LYMPHOCYTES NFR BLD: 17.4 %
MAGNESIUM SERPL-MCNC: 1.7 MG/DL (ref 1.7–2.4)
MCH RBC QN AUTO: 26.4 PG (ref 27–31.3)
MCHC RBC AUTO-ENTMCNC: 32.1 % (ref 33–37)
MCV RBC AUTO: 82.2 FL (ref 79–92.2)
MONOCYTES # BLD: 0.8 K/UL (ref 0.2–0.8)
MONOCYTES NFR BLD: 8.4 %
NEUTROPHILS # BLD: 6.4 K/UL (ref 1.4–6.5)
NEUTS SEG NFR BLD: 70.8 %
PLATELET # BLD AUTO: 252 K/UL (ref 130–400)
POTASSIUM SERPL-SCNC: 4.7 MEQ/L (ref 3.4–4.9)
PROT SERPL-MCNC: 7.4 G/DL (ref 6.3–8)
RBC # BLD AUTO: 5.07 M/UL (ref 4.7–6.1)
SARS-COV-2 RDRP RESP QL NAA+PROBE: DETECTED
SODIUM SERPL-SCNC: 131 MEQ/L (ref 135–144)
TROPONIN, HIGH SENSITIVITY: 16 NG/L (ref 0–19)
WBC # BLD AUTO: 9 K/UL (ref 4.8–10.8)

## 2025-01-28 PROCEDURE — 84484 ASSAY OF TROPONIN QUANT: CPT

## 2025-01-28 PROCEDURE — 85025 COMPLETE CBC W/AUTO DIFF WBC: CPT

## 2025-01-28 PROCEDURE — 99285 EMERGENCY DEPT VISIT HI MDM: CPT

## 2025-01-28 PROCEDURE — 87635 SARS-COV-2 COVID-19 AMP PRB: CPT

## 2025-01-28 PROCEDURE — 87502 INFLUENZA DNA AMP PROBE: CPT

## 2025-01-28 PROCEDURE — 71045 X-RAY EXAM CHEST 1 VIEW: CPT

## 2025-01-28 PROCEDURE — 87040 BLOOD CULTURE FOR BACTERIA: CPT

## 2025-01-28 PROCEDURE — 83605 ASSAY OF LACTIC ACID: CPT

## 2025-01-28 PROCEDURE — 85379 FIBRIN DEGRADATION QUANT: CPT

## 2025-01-28 PROCEDURE — 96374 THER/PROPH/DIAG INJ IV PUSH: CPT

## 2025-01-28 PROCEDURE — 83880 ASSAY OF NATRIURETIC PEPTIDE: CPT

## 2025-01-28 PROCEDURE — 36415 COLL VENOUS BLD VENIPUNCTURE: CPT

## 2025-01-28 PROCEDURE — 6360000002 HC RX W HCPCS: Performed by: PHYSICIAN ASSISTANT

## 2025-01-28 PROCEDURE — 94640 AIRWAY INHALATION TREATMENT: CPT

## 2025-01-28 PROCEDURE — 83735 ASSAY OF MAGNESIUM: CPT

## 2025-01-28 PROCEDURE — 6370000000 HC RX 637 (ALT 250 FOR IP): Performed by: PHYSICIAN ASSISTANT

## 2025-01-28 PROCEDURE — 93005 ELECTROCARDIOGRAM TRACING: CPT | Performed by: PHYSICIAN ASSISTANT

## 2025-01-28 PROCEDURE — 80053 COMPREHEN METABOLIC PANEL: CPT

## 2025-01-28 RX ORDER — METHYLPREDNISOLONE SODIUM SUCCINATE 125 MG/2ML
125 INJECTION INTRAMUSCULAR; INTRAVENOUS ONCE
Status: COMPLETED | OUTPATIENT
Start: 2025-01-28 | End: 2025-01-28

## 2025-01-28 RX ORDER — IPRATROPIUM BROMIDE AND ALBUTEROL SULFATE 2.5; .5 MG/3ML; MG/3ML
1 SOLUTION RESPIRATORY (INHALATION) ONCE
Status: COMPLETED | OUTPATIENT
Start: 2025-01-28 | End: 2025-01-28

## 2025-01-28 RX ADMIN — IPRATROPIUM BROMIDE AND ALBUTEROL SULFATE 1 DOSE: 2.5; .5 SOLUTION RESPIRATORY (INHALATION) at 21:03

## 2025-01-28 RX ADMIN — METHYLPREDNISOLONE SODIUM SUCCINATE 125 MG: 125 INJECTION INTRAMUSCULAR; INTRAVENOUS at 21:43

## 2025-01-28 ASSESSMENT — ENCOUNTER SYMPTOMS
COUGH: 1
ANAL BLEEDING: 0
SORE THROAT: 1
NAUSEA: 1
SHORTNESS OF BREATH: 1
ABDOMINAL DISTENTION: 0
EYE DISCHARGE: 0
VOICE CHANGE: 0
VOMITING: 0

## 2025-01-28 ASSESSMENT — LIFESTYLE VARIABLES
HOW OFTEN DO YOU HAVE A DRINK CONTAINING ALCOHOL: NEVER
HOW MANY STANDARD DRINKS CONTAINING ALCOHOL DO YOU HAVE ON A TYPICAL DAY: PATIENT DOES NOT DRINK

## 2025-01-29 ENCOUNTER — APPOINTMENT (OUTPATIENT)
Dept: CT IMAGING | Age: 35
DRG: 137 | End: 2025-01-29
Attending: INTERNAL MEDICINE
Payer: COMMERCIAL

## 2025-01-29 PROBLEM — U07.1 COVID-19: Status: ACTIVE | Noted: 2025-01-29

## 2025-01-29 LAB
ALBUMIN SERPL-MCNC: 4 G/DL (ref 3.5–4.6)
ALP SERPL-CCNC: 144 U/L (ref 35–104)
ALT SERPL-CCNC: 21 U/L (ref 0–41)
ANION GAP SERPL CALCULATED.3IONS-SCNC: 12 MEQ/L (ref 9–15)
AST SERPL-CCNC: 17 U/L (ref 0–40)
BASOPHILS # BLD: 0 K/UL (ref 0–0.2)
BASOPHILS NFR BLD: 0.2 %
BILIRUB SERPL-MCNC: 0.8 MG/DL (ref 0.2–0.7)
BNP BLD-MCNC: 595 PG/ML
BUN SERPL-MCNC: 13 MG/DL (ref 6–20)
CALCIUM SERPL-MCNC: 9 MG/DL (ref 8.5–9.9)
CHLORIDE SERPL-SCNC: 94 MEQ/L (ref 95–107)
CO2 SERPL-SCNC: 22 MEQ/L (ref 20–31)
CREAT SERPL-MCNC: 0.83 MG/DL (ref 0.7–1.2)
D DIMER PPP FEU-MCNC: 0.83 MG/L FEU (ref 0–0.5)
EKG ATRIAL RATE: 121 BPM
EKG P AXIS: 51 DEGREES
EKG P-R INTERVAL: 156 MS
EKG Q-T INTERVAL: 334 MS
EKG QRS DURATION: 104 MS
EKG QTC CALCULATION (BAZETT): 474 MS
EKG R AXIS: -37 DEGREES
EKG T AXIS: 63 DEGREES
EKG VENTRICULAR RATE: 121 BPM
EOSINOPHIL # BLD: 0 K/UL (ref 0–0.7)
EOSINOPHIL NFR BLD: 0.2 %
ERYTHROCYTE [DISTWIDTH] IN BLOOD BY AUTOMATED COUNT: 14.6 % (ref 11.5–14.5)
GLOBULIN SER CALC-MCNC: 3.7 G/DL (ref 2.3–3.5)
GLUCOSE BLD-MCNC: 262 MG/DL (ref 70–99)
GLUCOSE BLD-MCNC: 306 MG/DL (ref 70–99)
GLUCOSE BLD-MCNC: 306 MG/DL (ref 70–99)
GLUCOSE BLD-MCNC: 310 MG/DL (ref 70–99)
GLUCOSE SERPL-MCNC: 349 MG/DL (ref 70–99)
HCT VFR BLD AUTO: 42.5 % (ref 42–52)
HGB BLD-MCNC: 13.5 G/DL (ref 14–18)
LYMPHOCYTES # BLD: 0.8 K/UL (ref 1–4.8)
LYMPHOCYTES NFR BLD: 13.2 %
MCH RBC QN AUTO: 25.9 PG (ref 27–31.3)
MCHC RBC AUTO-ENTMCNC: 31.8 % (ref 33–37)
MCV RBC AUTO: 81.4 FL (ref 79–92.2)
MONOCYTES # BLD: 0.1 K/UL (ref 0.2–0.8)
MONOCYTES NFR BLD: 2 %
NEUTROPHILS # BLD: 5 K/UL (ref 1.4–6.5)
NEUTS SEG NFR BLD: 83.9 %
PERFORMED ON: ABNORMAL
PLATELET # BLD AUTO: 275 K/UL (ref 130–400)
POTASSIUM SERPL-SCNC: 5 MEQ/L (ref 3.4–4.9)
PROT SERPL-MCNC: 7.7 G/DL (ref 6.3–8)
RBC # BLD AUTO: 5.22 M/UL (ref 4.7–6.1)
SODIUM SERPL-SCNC: 128 MEQ/L (ref 135–144)
WBC # BLD AUTO: 5.9 K/UL (ref 4.8–10.8)

## 2025-01-29 PROCEDURE — 94761 N-INVAS EAR/PLS OXIMETRY MLT: CPT

## 2025-01-29 PROCEDURE — 6370000000 HC RX 637 (ALT 250 FOR IP): Performed by: INTERNAL MEDICINE

## 2025-01-29 PROCEDURE — 6370000000 HC RX 637 (ALT 250 FOR IP)

## 2025-01-29 PROCEDURE — 71275 CT ANGIOGRAPHY CHEST: CPT

## 2025-01-29 PROCEDURE — 6360000004 HC RX CONTRAST MEDICATION: Performed by: INTERNAL MEDICINE

## 2025-01-29 PROCEDURE — 99223 1ST HOSP IP/OBS HIGH 75: CPT | Performed by: INTERNAL MEDICINE

## 2025-01-29 PROCEDURE — 6360000002 HC RX W HCPCS

## 2025-01-29 PROCEDURE — 96375 TX/PRO/DX INJ NEW DRUG ADDON: CPT

## 2025-01-29 PROCEDURE — 1210000000 HC MED SURG R&B

## 2025-01-29 PROCEDURE — 6360000002 HC RX W HCPCS: Performed by: INTERNAL MEDICINE

## 2025-01-29 PROCEDURE — 2500000003 HC RX 250 WO HCPCS

## 2025-01-29 PROCEDURE — 85025 COMPLETE CBC W/AUTO DIFF WBC: CPT

## 2025-01-29 PROCEDURE — 93010 ELECTROCARDIOGRAM REPORT: CPT | Performed by: INTERNAL MEDICINE

## 2025-01-29 PROCEDURE — 36415 COLL VENOUS BLD VENIPUNCTURE: CPT

## 2025-01-29 PROCEDURE — 80053 COMPREHEN METABOLIC PANEL: CPT

## 2025-01-29 RX ORDER — ALBUTEROL SULFATE 90 UG/1
2 INHALANT RESPIRATORY (INHALATION) 4 TIMES DAILY PRN
Status: DISCONTINUED | OUTPATIENT
Start: 2025-01-29 | End: 2025-01-30 | Stop reason: HOSPADM

## 2025-01-29 RX ORDER — ATORVASTATIN CALCIUM 10 MG/1
10 TABLET, FILM COATED ORAL ONCE
Status: COMPLETED | OUTPATIENT
Start: 2025-01-29 | End: 2025-01-29

## 2025-01-29 RX ORDER — POTASSIUM CHLORIDE 7.45 MG/ML
10 INJECTION INTRAVENOUS PRN
Status: DISCONTINUED | OUTPATIENT
Start: 2025-01-29 | End: 2025-01-30 | Stop reason: HOSPADM

## 2025-01-29 RX ORDER — GUAIFENESIN 600 MG/1
600 TABLET, EXTENDED RELEASE ORAL 2 TIMES DAILY
Status: DISCONTINUED | OUTPATIENT
Start: 2025-01-29 | End: 2025-01-30 | Stop reason: HOSPADM

## 2025-01-29 RX ORDER — BENZONATATE 100 MG/1
100 CAPSULE ORAL 3 TIMES DAILY PRN
Status: DISCONTINUED | OUTPATIENT
Start: 2025-01-29 | End: 2025-01-30 | Stop reason: HOSPADM

## 2025-01-29 RX ORDER — MAGNESIUM SULFATE IN WATER 40 MG/ML
2000 INJECTION, SOLUTION INTRAVENOUS PRN
Status: DISCONTINUED | OUTPATIENT
Start: 2025-01-29 | End: 2025-01-30 | Stop reason: HOSPADM

## 2025-01-29 RX ORDER — METOPROLOL TARTRATE 25 MG/1
12.5 TABLET, FILM COATED ORAL ONCE
Status: COMPLETED | OUTPATIENT
Start: 2025-01-29 | End: 2025-01-29

## 2025-01-29 RX ORDER — MAGNESIUM SULFATE IN WATER 40 MG/ML
2000 INJECTION, SOLUTION INTRAVENOUS ONCE
Status: COMPLETED | OUTPATIENT
Start: 2025-01-29 | End: 2025-01-29

## 2025-01-29 RX ORDER — LOSARTAN POTASSIUM 25 MG/1
25 TABLET ORAL DAILY
COMMUNITY
Start: 2024-12-26

## 2025-01-29 RX ORDER — SODIUM CHLORIDE 0.9 % (FLUSH) 0.9 %
5-40 SYRINGE (ML) INJECTION PRN
Status: DISCONTINUED | OUTPATIENT
Start: 2025-01-29 | End: 2025-01-30 | Stop reason: HOSPADM

## 2025-01-29 RX ORDER — DEXTROSE MONOHYDRATE 100 MG/ML
INJECTION, SOLUTION INTRAVENOUS CONTINUOUS PRN
Status: DISCONTINUED | OUTPATIENT
Start: 2025-01-29 | End: 2025-01-30 | Stop reason: HOSPADM

## 2025-01-29 RX ORDER — METOPROLOL TARTRATE 25 MG/1
12.5 TABLET, FILM COATED ORAL 2 TIMES DAILY
Status: DISCONTINUED | OUTPATIENT
Start: 2025-01-29 | End: 2025-01-30 | Stop reason: HOSPADM

## 2025-01-29 RX ORDER — METOPROLOL TARTRATE 25 MG/1
12.5 TABLET, FILM COATED ORAL 2 TIMES DAILY
COMMUNITY

## 2025-01-29 RX ORDER — ONDANSETRON 2 MG/ML
4 INJECTION INTRAMUSCULAR; INTRAVENOUS EVERY 6 HOURS PRN
Status: DISCONTINUED | OUTPATIENT
Start: 2025-01-29 | End: 2025-01-30 | Stop reason: HOSPADM

## 2025-01-29 RX ORDER — ONDANSETRON 4 MG/1
4 TABLET, ORALLY DISINTEGRATING ORAL EVERY 8 HOURS PRN
Status: DISCONTINUED | OUTPATIENT
Start: 2025-01-29 | End: 2025-01-30 | Stop reason: HOSPADM

## 2025-01-29 RX ORDER — ENOXAPARIN SODIUM 100 MG/ML
60 INJECTION SUBCUTANEOUS 2 TIMES DAILY
Status: DISCONTINUED | OUTPATIENT
Start: 2025-01-30 | End: 2025-01-30 | Stop reason: HOSPADM

## 2025-01-29 RX ORDER — POTASSIUM CHLORIDE 1500 MG/1
40 TABLET, EXTENDED RELEASE ORAL PRN
Status: DISCONTINUED | OUTPATIENT
Start: 2025-01-29 | End: 2025-01-30 | Stop reason: HOSPADM

## 2025-01-29 RX ORDER — IOPAMIDOL 612 MG/ML
125 INJECTION, SOLUTION INTRAVASCULAR
Status: COMPLETED | OUTPATIENT
Start: 2025-01-29 | End: 2025-01-29

## 2025-01-29 RX ORDER — LOSARTAN POTASSIUM 25 MG/1
25 TABLET ORAL DAILY
Status: DISCONTINUED | OUTPATIENT
Start: 2025-01-29 | End: 2025-01-30 | Stop reason: HOSPADM

## 2025-01-29 RX ORDER — ACETAMINOPHEN 325 MG/1
650 TABLET ORAL EVERY 6 HOURS PRN
Status: DISCONTINUED | OUTPATIENT
Start: 2025-01-29 | End: 2025-01-30 | Stop reason: HOSPADM

## 2025-01-29 RX ORDER — SODIUM CHLORIDE 9 MG/ML
INJECTION, SOLUTION INTRAVENOUS PRN
Status: DISCONTINUED | OUTPATIENT
Start: 2025-01-29 | End: 2025-01-30 | Stop reason: HOSPADM

## 2025-01-29 RX ORDER — DEXAMETHASONE SODIUM PHOSPHATE 4 MG/ML
6 INJECTION, SOLUTION INTRA-ARTICULAR; INTRALESIONAL; INTRAMUSCULAR; INTRAVENOUS; SOFT TISSUE EVERY 24 HOURS
Status: DISCONTINUED | OUTPATIENT
Start: 2025-01-29 | End: 2025-01-30 | Stop reason: HOSPADM

## 2025-01-29 RX ORDER — POLYETHYLENE GLYCOL 3350 17 G/17G
17 POWDER, FOR SOLUTION ORAL DAILY PRN
Status: DISCONTINUED | OUTPATIENT
Start: 2025-01-29 | End: 2025-01-30 | Stop reason: HOSPADM

## 2025-01-29 RX ORDER — ATORVASTATIN CALCIUM 10 MG/1
10 TABLET, FILM COATED ORAL NIGHTLY
Status: DISCONTINUED | OUTPATIENT
Start: 2025-01-30 | End: 2025-01-30 | Stop reason: HOSPADM

## 2025-01-29 RX ORDER — INSULIN LISPRO 100 [IU]/ML
0-8 INJECTION, SOLUTION INTRAVENOUS; SUBCUTANEOUS
Status: DISCONTINUED | OUTPATIENT
Start: 2025-01-29 | End: 2025-01-30 | Stop reason: HOSPADM

## 2025-01-29 RX ORDER — ACETAMINOPHEN 650 MG/1
650 SUPPOSITORY RECTAL EVERY 6 HOURS PRN
Status: DISCONTINUED | OUTPATIENT
Start: 2025-01-29 | End: 2025-01-30 | Stop reason: HOSPADM

## 2025-01-29 RX ORDER — GLUCAGON 1 MG/ML
1 KIT INJECTION PRN
Status: DISCONTINUED | OUTPATIENT
Start: 2025-01-29 | End: 2025-01-30 | Stop reason: HOSPADM

## 2025-01-29 RX ORDER — SODIUM CHLORIDE 0.9 % (FLUSH) 0.9 %
5-40 SYRINGE (ML) INJECTION EVERY 12 HOURS SCHEDULED
Status: DISCONTINUED | OUTPATIENT
Start: 2025-01-29 | End: 2025-01-30 | Stop reason: HOSPADM

## 2025-01-29 RX ADMIN — INSULIN LISPRO 4 UNITS: 100 INJECTION, SOLUTION INTRAVENOUS; SUBCUTANEOUS at 12:12

## 2025-01-29 RX ADMIN — APIXABAN 10 MG: 5 TABLET, FILM COATED ORAL at 03:46

## 2025-01-29 RX ADMIN — ATORVASTATIN CALCIUM 10 MG: 10 TABLET, FILM COATED ORAL at 01:51

## 2025-01-29 RX ADMIN — METFORMIN HYDROCHLORIDE 500 MG: 500 TABLET ORAL at 01:51

## 2025-01-29 RX ADMIN — LOSARTAN POTASSIUM 25 MG: 25 TABLET, FILM COATED ORAL at 11:12

## 2025-01-29 RX ADMIN — SODIUM CHLORIDE, PRESERVATIVE FREE 10 ML: 5 INJECTION INTRAVENOUS at 21:13

## 2025-01-29 RX ADMIN — METOPROLOL TARTRATE 12.5 MG: 25 TABLET, FILM COATED ORAL at 11:12

## 2025-01-29 RX ADMIN — MAGNESIUM SULFATE HEPTAHYDRATE 2000 MG: 40 INJECTION, SOLUTION INTRAVENOUS at 01:59

## 2025-01-29 RX ADMIN — INSULIN LISPRO 6 UNITS: 100 INJECTION, SOLUTION INTRAVENOUS; SUBCUTANEOUS at 09:42

## 2025-01-29 RX ADMIN — INSULIN LISPRO 6 UNITS: 100 INJECTION, SOLUTION INTRAVENOUS; SUBCUTANEOUS at 21:13

## 2025-01-29 RX ADMIN — GUAIFENESIN 600 MG: 600 TABLET ORAL at 09:40

## 2025-01-29 RX ADMIN — METOPROLOL TARTRATE 12.5 MG: 25 TABLET, FILM COATED ORAL at 21:12

## 2025-01-29 RX ADMIN — IOPAMIDOL 125 ML: 612 INJECTION, SOLUTION INTRAVENOUS at 10:46

## 2025-01-29 RX ADMIN — DEXAMETHASONE SODIUM PHOSPHATE 6 MG: 4 INJECTION INTRA-ARTICULAR; INTRALESIONAL; INTRAMUSCULAR; INTRAVENOUS; SOFT TISSUE at 09:41

## 2025-01-29 RX ADMIN — Medication 3 MG: at 21:12

## 2025-01-29 RX ADMIN — METOPROLOL TARTRATE 12.5 MG: 25 TABLET, FILM COATED ORAL at 01:51

## 2025-01-29 RX ADMIN — GUAIFENESIN 600 MG: 600 TABLET ORAL at 21:12

## 2025-01-29 ASSESSMENT — PAIN DESCRIPTION - ORIENTATION: ORIENTATION: RIGHT;LEFT

## 2025-01-29 ASSESSMENT — PAIN DESCRIPTION - DESCRIPTORS: DESCRIPTORS: NUMBNESS;SHOOTING;SHARP

## 2025-01-29 ASSESSMENT — PAIN DESCRIPTION - LOCATION: LOCATION: LEG

## 2025-01-29 ASSESSMENT — PAIN SCALES - GENERAL
PAINLEVEL_OUTOF10: 8
PAINLEVEL_OUTOF10: 0

## 2025-01-29 NOTE — CARE COORDINATION
Case Management Assessment  Initial Evaluation    Date/Time of Evaluation: 1/29/2025 3:45 PM  Assessment Completed by: Krista Odom RN    If patient is discharged prior to next notation, then this note serves as note for discharge by case management.    Patient Name: Attila Salinas                   YOB: 1990  Diagnosis: Hypoxemia [R09.02]  Productive cough [R05.8]  Pneumonia due to COVID-19 virus [U07.1, J12.82]  COVID-19 [U07.1]                   Date / Time: 1/28/2025  8:30 PM    Patient Admission Status: Inpatient   Readmission Risk (Low < 19, Mod (19-27), High > 27): Readmission Risk Score: 10    Current PCP: Carissa Bello RD  PCP verified by CM? Yes    Chart Reviewed: Yes      History Provided by: Patient  Patient Orientation: Alert and Oriented, Person, Place, Situation, Self    Patient Cognition: Alert    Hospitalization in the last 30 days (Readmission):  No    If yes, Readmission Assessment in CM Navigator will be completed.    Advance Directives:      Code Status: Full Code   Patient's Primary Decision Maker is: Legal Next of Kin      Discharge Planning:    Patient lives with: Spouse/Significant Other, Family Members Type of Home: House  Primary Care Giver: Self  Patient Support Systems include: Spouse/Significant Other, Family Members   Current Financial resources:    Current community resources:    Current services prior to admission: None            Current DME:              Type of Home Care services:  None    ADLS  Prior functional level: Independent in ADLs/IADLs  Current functional level: Independent in ADLs/IADLs    PT AM-PAC:   /24  OT AM-PAC:   /24    Family can provide assistance at DC: Yes  Would you like Case Management to discuss the discharge plan with any other family members/significant others, and if so, who? No  Plans to Return to Present Housing: Yes  Other Identified Issues/Barriers to RETURNING to current housing: NO  Potential Assistance needed at discharge:

## 2025-01-29 NOTE — PLAN OF CARE
Problem: Chronic Conditions and Co-morbidities  Goal: Patient's chronic conditions and co-morbidity symptoms are monitored and maintained or improved  1/29/2025 1029 by Carol Ann Oneal RN  Outcome: Progressing  1/29/2025 0649 by Indira Mccarty RN  Outcome: Progressing     Problem: Discharge Planning  Goal: Discharge to home or other facility with appropriate resources  1/29/2025 1029 by Carol Ann Oneal RN  Outcome: Progressing  1/29/2025 0649 by Indira Mccarty RN  Outcome: Progressing  Flowsheets (Taken 1/29/2025 0528)  Discharge to home or other facility with appropriate resources:   Identify barriers to discharge with patient and caregiver   Identify discharge learning needs (meds, wound care, etc)   Refer to discharge planning if patient needs post-hospital services based on physician order or complex needs related to functional status, cognitive ability or social support system   Arrange for needed discharge resources and transportation as appropriate   Arrange for interpreters to assist at discharge as needed     Problem: Safety - Adult  Goal: Free from fall injury  1/29/2025 1029 by aCrol Ann Oneal RN  Outcome: Progressing  1/29/2025 0649 by Indira Mccarty RN  Outcome: Progressing     Problem: Pain  Goal: Verbalizes/displays adequate comfort level or baseline comfort level  1/29/2025 1029 by Carol Ann Oneal RN  Outcome: Progressing  1/29/2025 0649 by Indira Mccarty RN  Outcome: Progressing     Problem: Respiratory - Adult  Goal: Achieves optimal ventilation and oxygenation  1/29/2025 1029 by Carol Ann Oneal RN  Outcome: Progressing  1/29/2025 0649 by Indira Mccarty RN  Outcome: Progressing     Problem: Cardiovascular - Adult  Goal: Maintains optimal cardiac output and hemodynamic stability  1/29/2025 1029 by Carol Ann Oneal RN  Outcome: Progressing  1/29/2025 0649 by Indira Mccarty RN  Outcome: Progressing  Goal: Absence of cardiac dysrhythmias or at baseline  1/29/2025 1029 by Carol Ann Oneal RN  Outcome:

## 2025-01-29 NOTE — PLAN OF CARE
Problem: Chronic Conditions and Co-morbidities  Goal: Patient's chronic conditions and co-morbidity symptoms are monitored and maintained or improved  Outcome: Progressing     Problem: Discharge Planning  Goal: Discharge to home or other facility with appropriate resources  Outcome: Progressing  Flowsheets (Taken 1/29/2025 8388)  Discharge to home or other facility with appropriate resources:   Identify barriers to discharge with patient and caregiver   Identify discharge learning needs (meds, wound care, etc)   Refer to discharge planning if patient needs post-hospital services based on physician order or complex needs related to functional status, cognitive ability or social support system   Arrange for needed discharge resources and transportation as appropriate   Arrange for interpreters to assist at discharge as needed     Problem: Safety - Adult  Goal: Free from fall injury  Outcome: Progressing     Problem: Pain  Goal: Verbalizes/displays adequate comfort level or baseline comfort level  Outcome: Progressing     Problem: Respiratory - Adult  Goal: Achieves optimal ventilation and oxygenation  Outcome: Progressing     Problem: Cardiovascular - Adult  Goal: Maintains optimal cardiac output and hemodynamic stability  Outcome: Progressing  Goal: Absence of cardiac dysrhythmias or at baseline  Outcome: Progressing     Problem: Skin/Tissue Integrity - Adult  Goal: Skin integrity remains intact  Outcome: Progressing  Goal: Incisions, wounds, or drain sites healing without S/S of infection  Outcome: Progressing  Goal: Oral mucous membranes remain intact  Outcome: Progressing     Problem: Gastrointestinal - Adult  Goal: Minimal or absence of nausea and vomiting  Outcome: Progressing  Goal: Maintains or returns to baseline bowel function  Outcome: Progressing  Goal: Maintains adequate nutritional intake  Outcome: Progressing     Problem: Infection - Adult  Goal: Absence of infection at discharge  Outcome:  0

## 2025-01-29 NOTE — CONSULTS
alert, oriented, normal speech, no focal findings or movement disorder noted.  Musculoskeletal - no joint tenderness, deformity or swelling  Extremities - peripheral pulses normal, no pedal edema, no clubbing or cyanosis  Skin - normal coloration and turgor, no rashes   Psych: Normal mood           Recent Labs     01/28/25 2135 01/29/25  0533   WBC 9.0 5.9   HGB 13.4* 13.5*   HCT 41.7* 42.5    275     BMP:    Recent Labs     01/28/25 2135 01/29/25  0533   * 128*   K 4.7 5.0*   CL 96 94*   CO2 22 22   BUN 11 13   CREATININE 0.70 0.83   GLUCOSE 215* 349*   CALCIUM 8.8 9.0   MG 1.7  --      HEPATIC:   Recent Labs     01/28/25 2135 01/29/25  0533   AST 19 17   ALT 20 21   BILITOT 1.0* 0.8*   ALKPHOS 139* 144*     LACTATE:   Recent Labs     01/28/25 2135   LACTA 2.5*            Radiology Review:    CXR portable: Results for orders placed during the hospital encounter of 01/28/25    XR CHEST PORTABLE    Narrative  EXAMINATION:  ONE XRAY VIEW OF THE CHEST    1/28/2025 9:03 pm    COMPARISON:  12/14/2024    HISTORY:  ORDERING SYSTEM PROVIDED HISTORY: SOB/cough  TECHNOLOGIST PROVIDED HISTORY:  Reason for exam:->SOB/cough  What reading provider will be dictating this exam?->CRC    FINDINGS:  Two views of the chest demonstrate patchy alveolar infiltrate in the right  lung base with increased markings.  There is mild cardiomegaly without  evidence of a pneumothorax.  The soft tissues and osseous structures appear  unremarkable.    Impression  1. Patchy alveolar infiltrate in the right lung base with increased markings.  2. Mild cardiomegaly.    Echo:            Impression:    -  Acute hypoxic respiratory failure due to COVID-19 infection.  Oxygen saturation was below 88%.  -COVID-19 pneumonia.  -Bilateral pulm infiltrates due to COVID-19 pneumonia  -Mild lactic acidosis.  - hypokalemia  -Morbid obesity.  Likely sleep disordered breathing.  -Elevated D-dimer.             Recommendations:    - Continue close

## 2025-01-29 NOTE — H&P
Hospitalist Group   History and Physical    Attending: Dr. Mims    CHIEF COMPLAINT:  SOB    History of Present Illness:  Attila Salinas is a 34 y.o. male with a PMHx of CHF, DM2, HLD, HTN, and morbid obesity per the patient and EMR review, presents with a chief complaint of SOB x 4 days.  Reports that for 4 days, he has been short of breath, wheezing, with a productive cough (yellow sputum) that has been keeping him up at night.  Endorses orthopnea and SCRUGGS.  Subjective fevers and chills at home.  States that he has multiple family members with similar symptoms.  Endorses intermittent nausea.  Denies vomiting and diarrhea.  Denies chest pain, headaches, lightheadedness, dizziness, and myalgias.  Denies any UTI-like symptoms.  Typically on room air at home.  Ambulatory oxygen test in ED-patient SpO2 dropped to 88% while ambulating.  Currently requiring 2 L nasal cannula.    D-dimer positive in ED.  However, due to patient's body habitus/weight, unable to complete CTA chest.  ED provider reported that they called several surrounding hospitals who are also unable to accommodate.    REVIEW OF SYSTEMS:  No fevers, chills, cp, sob, n/v, ha, vision/hearing changes, wt changes, hot/cold flashes, other open skin lesions, diarrhea, constipation, dysuria/hematuria unless noted in HPI. Complete ROS performed with the patient and is otherwise negative.    PMH:  Past Medical History:   Diagnosis Date    CHF (congestive heart failure) (HCC)     Diabetes mellitus (HCC)     Hyperlipidemia     Hypertension        Surgical History:  History reviewed. No pertinent surgical history.    Medications Prior to Admission:    Prior to Admission medications    Medication Sig Start Date End Date Taking? Authorizing Provider   albuterol sulfate HFA (VENTOLIN HFA) 108 (90 Base) MCG/ACT inhaler Inhale 2 puffs into the lungs 4 times daily as needed for Wheezing 12/14/24   Raina Calvo DO   metFORMIN (GLUCOPHAGE) 500 MG tablet Take 500 mg

## 2025-01-29 NOTE — ED NOTES
Been trying to call report to [address] [place] and just keep getting put on hold. [facility]  states to just give bedside report the nurse is still busy.

## 2025-01-29 NOTE — ED PROVIDER NOTES
[facility] EMERGENCY DEPARTMENT  EMERGENCY DEPARTMENT ENCOUNTER      Pt Name: Attila Salinas  MRN: [id number]  Birthdate 1990  Date of evaluation: 1/28/2025  Provider: Ryne Jones PA-C  8:46 PM EST    CHIEF COMPLAINT       Chief Complaint   Patient presents with    Shortness of Breath         HISTORY OF PRESENT ILLNESS   (Location/Symptom, Timing/Onset, Context/Setting, Quality, Duration, Modifying Factors, Severity)  Note limiting factors.   Attila Salinas is a 34 y.o. male who presents to the emergency department patient presents with 4-day history of productive cough feeling hot and cold nauseousness decreased appetite shortness of breath wheezing.  Patient states he has multiple family members with similar symptoms.  Patient does not use oxygen at home.  Symptoms moderate severity nothing improves or worsen symptoms patient denies ear pain, urinary bleeding, rectal bleeding.    HPI    Nursing Notes were reviewed.    REVIEW OF SYSTEMS    (2-9 systems for level 4, 10 or more for level 5)     Review of Systems   Constitutional:  Positive for chills and fever. Negative for activity change, appetite change and unexpected weight change.   HENT:  Positive for congestion and sore throat. Negative for ear discharge, ear pain, nosebleeds and voice change.    Eyes:  Negative for discharge.   Respiratory:  Positive for cough and shortness of breath.    Cardiovascular:  Negative for chest pain.   Gastrointestinal:  Positive for nausea. Negative for abdominal distention, anal bleeding and vomiting.   Genitourinary:  Negative for dysuria and hematuria.   Skin:  Negative for pallor.   Neurological:  Negative for seizures and facial asymmetry.   Psychiatric/Behavioral:  Negative for behavioral problems, self-injury and sleep disturbance.    All other systems reviewed and are negative.      Except as noted above the remainder of the review of systems was reviewed and negative.       PAST MEDICAL HISTORY     Past

## 2025-01-30 ENCOUNTER — APPOINTMENT (OUTPATIENT)
Dept: ULTRASOUND IMAGING | Age: 35
DRG: 137 | End: 2025-01-30
Payer: COMMERCIAL

## 2025-01-30 VITALS
OXYGEN SATURATION: 96 % | SYSTOLIC BLOOD PRESSURE: 128 MMHG | DIASTOLIC BLOOD PRESSURE: 93 MMHG | WEIGHT: 315 LBS | RESPIRATION RATE: 18 BRPM | HEART RATE: 89 BPM | BODY MASS INDEX: 36.45 KG/M2 | TEMPERATURE: 98.1 F | HEIGHT: 78 IN

## 2025-01-30 LAB
ALBUMIN SERPL-MCNC: 3.6 G/DL (ref 3.5–4.6)
ALP SERPL-CCNC: 121 U/L (ref 35–104)
ALT SERPL-CCNC: 18 U/L (ref 0–41)
ANION GAP SERPL CALCULATED.3IONS-SCNC: 12 MEQ/L (ref 9–15)
AST SERPL-CCNC: 14 U/L (ref 0–40)
BACTERIA BLD CULT ORG #2: NORMAL
BACTERIA BLD CULT: NORMAL
BASOPHILS # BLD: 0 K/UL (ref 0–0.2)
BASOPHILS NFR BLD: 0.3 %
BILIRUB SERPL-MCNC: 0.6 MG/DL (ref 0.2–0.7)
BUN SERPL-MCNC: 21 MG/DL (ref 6–20)
CALCIUM SERPL-MCNC: 8.9 MG/DL (ref 8.5–9.9)
CHLORIDE SERPL-SCNC: 99 MEQ/L (ref 95–107)
CO2 SERPL-SCNC: 23 MEQ/L (ref 20–31)
CREAT SERPL-MCNC: 0.78 MG/DL (ref 0.7–1.2)
EOSINOPHIL # BLD: 0.1 K/UL (ref 0–0.7)
EOSINOPHIL NFR BLD: 0.6 %
ERYTHROCYTE [DISTWIDTH] IN BLOOD BY AUTOMATED COUNT: 14.5 % (ref 11.5–14.5)
GLOBULIN SER CALC-MCNC: 3.4 G/DL (ref 2.3–3.5)
GLUCOSE BLD-MCNC: 259 MG/DL (ref 70–99)
GLUCOSE BLD-MCNC: 271 MG/DL (ref 70–99)
GLUCOSE SERPL-MCNC: 278 MG/DL (ref 70–99)
HCT VFR BLD AUTO: 40 % (ref 42–52)
HGB BLD-MCNC: 12.6 G/DL (ref 14–18)
LYMPHOCYTES # BLD: 2.3 K/UL (ref 1–4.8)
LYMPHOCYTES NFR BLD: 23.7 %
MCH RBC QN AUTO: 26.3 PG (ref 27–31.3)
MCHC RBC AUTO-ENTMCNC: 31.5 % (ref 33–37)
MCV RBC AUTO: 83.5 FL (ref 79–92.2)
MONOCYTES # BLD: 0.8 K/UL (ref 0.2–0.8)
MONOCYTES NFR BLD: 8.3 %
NEUTROPHILS # BLD: 6.3 K/UL (ref 1.4–6.5)
NEUTS SEG NFR BLD: 66.5 %
PERFORMED ON: ABNORMAL
PERFORMED ON: ABNORMAL
PLATELET # BLD AUTO: 271 K/UL (ref 130–400)
POTASSIUM SERPL-SCNC: 4.8 MEQ/L (ref 3.4–4.9)
PROT SERPL-MCNC: 7 G/DL (ref 6.3–8)
RBC # BLD AUTO: 4.79 M/UL (ref 4.7–6.1)
SODIUM SERPL-SCNC: 134 MEQ/L (ref 135–144)
WBC # BLD AUTO: 9.5 K/UL (ref 4.8–10.8)

## 2025-01-30 PROCEDURE — 93970 EXTREMITY STUDY: CPT

## 2025-01-30 PROCEDURE — 2700000000 HC OXYGEN THERAPY PER DAY

## 2025-01-30 PROCEDURE — 2500000003 HC RX 250 WO HCPCS

## 2025-01-30 PROCEDURE — 6370000000 HC RX 637 (ALT 250 FOR IP)

## 2025-01-30 PROCEDURE — 85025 COMPLETE CBC W/AUTO DIFF WBC: CPT

## 2025-01-30 PROCEDURE — 80053 COMPREHEN METABOLIC PANEL: CPT

## 2025-01-30 PROCEDURE — 6360000002 HC RX W HCPCS

## 2025-01-30 PROCEDURE — 6360000002 HC RX W HCPCS: Performed by: INTERNAL MEDICINE

## 2025-01-30 PROCEDURE — 94761 N-INVAS EAR/PLS OXIMETRY MLT: CPT

## 2025-01-30 PROCEDURE — 36415 COLL VENOUS BLD VENIPUNCTURE: CPT

## 2025-01-30 PROCEDURE — 6370000000 HC RX 637 (ALT 250 FOR IP): Performed by: INTERNAL MEDICINE

## 2025-01-30 RX ORDER — DEXAMETHASONE 6 MG/1
6 TABLET ORAL
Qty: 8 TABLET | Refills: 0 | Status: SHIPPED | OUTPATIENT
Start: 2025-01-30 | End: 2025-02-07

## 2025-01-30 RX ORDER — GUAIFENESIN 600 MG/1
600 TABLET, EXTENDED RELEASE ORAL 2 TIMES DAILY
Qty: 30 TABLET | Refills: 0 | Status: SHIPPED | OUTPATIENT
Start: 2025-01-30 | End: 2025-02-14

## 2025-01-30 RX ADMIN — SODIUM CHLORIDE, PRESERVATIVE FREE 10 ML: 5 INJECTION INTRAVENOUS at 09:35

## 2025-01-30 RX ADMIN — ENOXAPARIN SODIUM 60 MG: 100 INJECTION SUBCUTANEOUS at 09:37

## 2025-01-30 RX ADMIN — DEXAMETHASONE SODIUM PHOSPHATE 6 MG: 4 INJECTION INTRA-ARTICULAR; INTRALESIONAL; INTRAMUSCULAR; INTRAVENOUS; SOFT TISSUE at 09:33

## 2025-01-30 RX ADMIN — GUAIFENESIN 600 MG: 600 TABLET ORAL at 09:34

## 2025-01-30 RX ADMIN — METOPROLOL TARTRATE 12.5 MG: 25 TABLET, FILM COATED ORAL at 09:34

## 2025-01-30 RX ADMIN — INSULIN LISPRO 4 UNITS: 100 INJECTION, SOLUTION INTRAVENOUS; SUBCUTANEOUS at 12:23

## 2025-01-30 RX ADMIN — INSULIN LISPRO 4 UNITS: 100 INJECTION, SOLUTION INTRAVENOUS; SUBCUTANEOUS at 09:39

## 2025-01-30 RX ADMIN — LOSARTAN POTASSIUM 25 MG: 25 TABLET, FILM COATED ORAL at 09:34

## 2025-01-30 ASSESSMENT — PAIN SCALES - GENERAL: PAINLEVEL_OUTOF10: 0

## 2025-01-30 NOTE — DISCHARGE INSTRUCTIONS
Follow up with primary care physician in the next 7 days or sooner if needed. If you do not have a Primary care physician, please schedule an appointment with one. Please ask prior to discharge about a list of local providers.     Please return to ER or call 911 if you develop any significant signs or symptoms.    I may not have addressed all of your medical illnesses or the abnormal blood work or imaging therefore please ask your PCP to obtain [facility] record to follow up on all of the abnormal labs, imaging and findings that I have and have not addressed during your hospitalization.     Discharging you from the hospital does not mean that your medical care ends here and now. You may still need additional work up, investigation, monitoring, and treatment to be handled from this point on by outside providers including your PCP, Specialists and other healthcare providers.     For medication questions, contact your retail pharmacy and your PCP.    Your medical team at [facility] appreciates the opportunity to work with you to get well!    Dahlia Park DO  10:36 AM

## 2025-01-30 NOTE — DISCHARGE INSTR - DIET

## 2025-01-30 NOTE — DISCHARGE SUMMARY
Hospital Medicine Discharge Summary    Attila Salinas  :  1990  MRN:  [id number]    Admit date:  2025  Discharge date:  2025    Admitting Physician:  Chantal Mims MD  Primary Care Physician:  Carissa Bello, RD      Discharge Diagnoses:      COVID-19 pneumonia  Acute hypoxic respiratory failure in the setting of COVID-19 pneumonia  Hypertension morbid obesity  Diabetes type 2    Chief Complaint   Patient presents with    Shortness of Breath     Hospital Course:     Patient is a 34-year-old male who was hospitalized for COVID-19 pneumonia and respiratory failure in the setting of COVID-19 pneumonia.  He was admitted to medical floor and started on Decadron.  He was seen by pulm medicine.  Patient improved.  CTA was negative for PE.  Patient was discharged home in a stable condition.  He was given a prescription for Decadron to complete 10-day course.    Exam on discharge:   /69   Pulse 91   Temp 97.5 °F (36.4 °C) (Oral)   Resp 18   Ht 2.032 m (6' 8\")   Wt (!) 272.2 kg (600 lb)   SpO2 95%   BMI 65.91 kg/m²   General appearance: No apparent distress, appears stated age and cooperative.  HEENT: Pupils equal, round, and reactive to light. Conjunctivae/corneas clear.  Neck: Supple, with full range of motion. No jugular venous distention. Trachea midline.  Respiratory:  Normal respiratory effort. Clear to auscultation, bilaterally without Rales/Wheezes/Rhonchi.  Cardiovascular: Regular rate and rhythm with normal S1/S2 without murmurs, rubs or gallops.  Abdomen: Soft, non-tender, non-distended with normal bowel sounds.  Musculoskeletal: No clubbing, cyanosis or edema bilaterally.  Full range of motion without deformity.  Skin: Skin color, texture, turgor normal.  No rashes or lesions.  Neuro: Non Focal. Symetrical motor and tone. Nl Comprehension, Alert,awake and oriented. NL CN. Symetrical tone and reflexes.  Psychiatric: Alert and oriented, thought content appropriate, normal

## 2025-01-30 NOTE — FLOWSHEET NOTE
Am nursing  assessment completed.    Pt : awake and sitting at bedside              Alert and oriented.      Diet: tolerating  Code Status:fc        Oxygen: 2l NC  Complaints of:  denies                       Pain:  IV:   sl           patent/ flushed/ capped, no signs of infiltration noted, dressing clean/dry/intact.  TELE:     sr            Dressings:                           Precautions:  Droplet plus            Falls:   35     Neftaly: 20  Chart and meds reviewed.           Noted Labs: na 134 k 4.8 bun 21 cr0.78 w 9.5 h 12.6/40  Plan for today:    Bed wheels locked and in lowest position. Call light and bedside table within reach.   NOTES: Dr Park by for rounds. Pt for DVT study and home 02 eval today. Electronically signed by Iris Villafana RN on 1/30/2025 at 10:25 AM    1414 returned from ultrasound. O2 off for home o2 eval. Electronically signed by Iris Villafana RN on 1/30/2025 at 2:15 PM    1620 Iv and tele removed for discharge. Care plan completed for discharge. Appropriate education addressed in discharge instructions. Instructions completed and reviewed with patient. Verbalize understanding of instructions and follow up. Personal belongings gathered. No complaints. Declines transport. Electronically signed by Iris Villafana RN on 1/30/2025 at 4:20 PM

## 2025-01-30 NOTE — CARE COORDINATION
Patient admitted with COVID and Pneumonia. Due to isolation status, Pneumonia and COVID booklets, Pneumonia zone pamphlet information was mailed by CTN to patient's residence for their review.

## 2025-01-30 NOTE — PROGRESS NOTES
Pharmacist Review and Automatic Dose Adjustment of Prophylactic Enoxaparin         The reviewing pharmacist has made an adjustment to the ordered enoxaparin dose or converted to UFH per the approved [facility] protocol and table as identified below.        Attila Salinas is a 34 y.o. male.     Recent Labs     01/28/25 2135 01/29/25  0533   CREATININE 0.70 0.83       Estimated Creatinine Clearance: 293 mL/min (based on SCr of 0.83 mg/dL).    Recent Labs     01/28/25 2135 01/29/25  0533   HGB 13.4* 13.5*   HCT 41.7* 42.5    275     No results for input(s): \"INR\" in the last 72 hours.    Height:   Ht Readings from Last 1 Encounters:   01/28/25 2.032 m (6' 8\")     Weight:  Wt Readings from Last 1 Encounters:   01/28/25 (!) 272.2 kg (600 lb)               Plan: Based upon the patient's weight and renal function    Ordered: Enoxaparin 30mg SUBQ BID    Changed/converted to    New Order: Enoxaparin 60mg SUBQ BID      Thank you,  Billy Hawthorne, [facility]  1/29/2025, 3:51 PM    
   01/29/25 1600   RT Protocol   History Pulmonary Disease 0   Respiratory pattern 0   Breath sounds 2   Cough 0   Indications for Bronchodilator Therapy On home bronchodilators   Bronchodilator Assessment Score 2       
   01/30/25 1539   Resting (Room Air)   SpO2 96      During Walk (Room Air)   SpO2 93      Walk/Assistance Device Ambulation   Rate of Dyspnea 0   After Walk   SpO2 94      Does the Patient Qualify for Home O2 No   Does the Patient Need Portable Oxygen Tanks No       
  Physician Progress Note      PATIENT:               TELLY HOWARD  CSN #:                  [id number]  :                       1990  ADMIT DATE:       2025 8:30 PM  DISCH DATE:  RESPONDING  PROVIDER #:        Dahlia Park DO          QUERY TEXT:    Patient admitted with COVID. Noted documentation of acute respiratory failure   in H&P. In order to support the diagnosis of acute respiratory failure, please   include additional clinical indicators in your documentation.  Or please   document if the diagnosis of acute respiratory failure has been ruled out   after further study.    The medical record reflects the following:  Risk Factors: COVID, CHF  Clinical Indicators: SPO2 88-97% on RA-2L, RR 20-29, cxray Patchy alveolar   infiltrate in the right lung base with increased markings.  Treatment: 2L nasal cannula, Tessalon, Decadron, Mucinex, Duoneb, Magnesium,   Solumedrol  Options provided:  -- Acute Respiratory Failure as evidenced by, Please document evidence.  -- Acute Respiratory Failure ruled out after study  -- Other - I will add my own diagnosis  -- Disagree - Not applicable / Not valid  -- Disagree - Clinically unable to determine / Unknown  -- Refer to Clinical Documentation Reviewer    PROVIDER RESPONSE TEXT:    Acute Respiratory Failure has been ruled out after study.    Query created by: Linda Castle on 2025 8:14 AM      Electronically signed by:  Dahlia Park DO 2025 10:52 AM          
Occupational Therapy  Facility/Department: [facility] MED SUR[facility] W272/W272-01  Interdisciplinary Communication Note      To the referring provider,     While we thank you for your referral, Attila Salinas was not seen for an evaluation as:     The patient was observed as IND in the room and does not require skilled services. Pt observed ambulating to transport cart without difficulty and able to get onto cart without LOB. Pt states he feels baseline and has no concerns for functional mobility for return home. Declines need for OT eval at this time. Please reorder if pt's status changes.    Thank you,    Electronically signed by ROB Cardona/L on 1/29/25 at 11:13 AM EST    [facility].  
Physical Therapy Missed Treatment   Facility/Department: [facility] MED SURG W272/W272-01    NAME: Attila Salinas    : 1990 (34 y.o.)  MRN: [id number]    Account: [id number]  Gender: male      Pt not evaluated for PT evaluation, observed walking Indep in and out of room and getting onto cart for testing.  Pt verbalized functioning at baseline and no LOB or impairment noted during observation.   New orders can be initiated for any changes or needs.     Thank you,    Yasmin Duvall, PT, 25 at 11:14 AM    
STAT CTA order received and nursing communication. This RN called CT and informed them that the pt is 6'8\" per Dr Park's order. CT states they will bring pt down and attempt. Dr Park notified.  
indicated  Neurological - alert, oriented, normal speech, no focal findings or movement disorder noted.  Musculoskeletal - no joint tenderness, deformity or swelling  Extremities - peripheral pulses normal, no pedal edema, no clubbing or cyanosis  Skin - normal coloration and turgor, no rashes   Psych: Normal mood             BMP:    Recent Labs     01/28/25  2135 01/29/25  0533 01/30/25  0508   * 128* 134*   K 4.7 5.0* 4.8   CL 96 94* 99   CO2 22 22 23   BUN 11 13 21*   CREATININE 0.70 0.83 0.78   GLUCOSE 215* 349* 278*   MG 1.7  --   --     .  MG:3,PHOS:3)@  Ionized Calcium: No components found for: \"IONCA\"  CBC:   Recent Labs     01/29/25  0533 01/30/25  0508   WBC 5.9 9.5   HGB 13.5* 12.6*    271      ABG: No results for input(s): \"PH\", \"PCO2\", \"PO2\" in the last 72 hours.        Assessment and Plan:       -  Acute hypoxic respiratory failure due to COVID-19 infection.  Oxygen saturation was below 88%.  -COVID-19 pneumonia.  -Bilateral pulm infiltrates due to COVID-19 pneumonia  -Mild lactic acidosis.  - hypokalemia  -Morbid obesity.  Likely sleep disordered breathing.  -Elevated D-dimer.               Recommendations:     - Continue close monitoring of hemodynamic and respiratory status in droplet plus isolation.   - C/w supplemental Oxygen to maintain pulse oximetry values above 90%  -Steroids for 10 days  -Check lower extremity venous Doppler.  -  Glycemic control, maintain FSG between 140-180 mg/dL   -Venous thromboembolism prophylaxis   - attempt to keep on dry side.   - strict I/O          Electronically signed by Marilu Gamez MD on 1/30/2025 at 4:08 PM

## 2025-02-03 LAB
BACTERIA BLD CULT ORG #2: NORMAL
BACTERIA BLD CULT: NORMAL

## 2025-02-10 ENCOUNTER — OFFICE VISIT (OUTPATIENT)
Dept: ENDOCRINOLOGY | Age: 35
End: 2025-02-10
Payer: COMMERCIAL

## 2025-02-10 VITALS
BODY MASS INDEX: 65.91 KG/M2 | HEART RATE: 93 BPM | DIASTOLIC BLOOD PRESSURE: 89 MMHG | HEIGHT: 78 IN | SYSTOLIC BLOOD PRESSURE: 129 MMHG

## 2025-02-10 DIAGNOSIS — E88.819 INSULIN RESISTANCE: ICD-10-CM

## 2025-02-10 DIAGNOSIS — E66.01 MORBID OBESITY: ICD-10-CM

## 2025-02-10 DIAGNOSIS — E11.9 NEW ONSET TYPE 2 DIABETES MELLITUS (HCC): Primary | ICD-10-CM

## 2025-02-10 LAB
CHP ED QC CHECK: NORMAL
GLUCOSE BLD-MCNC: 167 MG/DL

## 2025-02-10 PROCEDURE — 1111F DSCHRG MED/CURRENT MED MERGE: CPT | Performed by: INTERNAL MEDICINE

## 2025-02-10 PROCEDURE — 3046F HEMOGLOBIN A1C LEVEL >9.0%: CPT | Performed by: INTERNAL MEDICINE

## 2025-02-10 PROCEDURE — G8417 CALC BMI ABV UP PARAM F/U: HCPCS | Performed by: INTERNAL MEDICINE

## 2025-02-10 PROCEDURE — G8427 DOCREV CUR MEDS BY ELIG CLIN: HCPCS | Performed by: INTERNAL MEDICINE

## 2025-02-10 PROCEDURE — 99203 OFFICE O/P NEW LOW 30 MIN: CPT | Performed by: INTERNAL MEDICINE

## 2025-02-10 PROCEDURE — 2022F DILAT RTA XM EVC RTNOPTHY: CPT | Performed by: INTERNAL MEDICINE

## 2025-02-10 PROCEDURE — 1036F TOBACCO NON-USER: CPT | Performed by: INTERNAL MEDICINE

## 2025-02-10 PROCEDURE — 82962 GLUCOSE BLOOD TEST: CPT | Performed by: INTERNAL MEDICINE

## 2025-02-10 RX ORDER — LANCETS 30 GAUGE
EACH MISCELLANEOUS
Qty: 100 EACH | Refills: 3 | Status: SHIPPED | OUTPATIENT
Start: 2025-02-10

## 2025-02-10 RX ORDER — GLUCOSAMINE HCL/CHONDROITIN SU 500-400 MG
CAPSULE ORAL
Qty: 100 STRIP | Refills: 3 | Status: SHIPPED | OUTPATIENT
Start: 2025-02-10

## 2025-02-10 NOTE — PROGRESS NOTES
>200 mg/dl.     Past Medical History:   Diagnosis Date    CHF (congestive heart failure) (HCC)     Diabetes mellitus (HCC)     Hyperlipidemia     Hypertension      History reviewed. No pertinent surgical history.  Social History     Socioeconomic History    Marital status: Single     Spouse name: Not on file    Number of children: Not on file    Years of education: Not on file    Highest education level: Not on file   Occupational History    Not on file   Tobacco Use    Smoking status: Never    Smokeless tobacco: Never   Vaping Use    Vaping status: Never Used   Substance and Sexual Activity    Alcohol use: No    Drug use: No    Sexual activity: Yes     Partners: Female   Other Topics Concern    Not on file   Social History Narrative    Not on file     Social Determinants of Health     Financial Resource Strain: Low Risk  (3/29/2024)    Received from [facility]    Overall Financial Resource Strain (CARDIA)     Difficulty of Paying Living Expenses: Not hard at all   Food Insecurity: No Food Insecurity (1/29/2025)    Hunger Vital Sign     Worried About Running Out of Food in the Last Year: Never true     Ran Out of Food in the Last Year: Never true   Transportation Needs: No Transportation Needs (1/29/2025)    PRAPARE - Transportation     Lack of Transportation (Medical): No     Lack of Transportation (Non-Medical): No   Physical Activity: Insufficiently Active (3/29/2024)    Received from [facility]    Exercise Vital Sign     Days of Exercise per Week: 3 days     Minutes of Exercise per Session: 30 min   Stress: No Stress Concern Present (3/29/2024)    Received from [facility]    [employer] - Occupational Stress Questionnaire     Feeling of Stress : Not at all   Social Connections: Moderately Isolated (3/29/2024)    Received from [facility]    Social Connection and Isolation Panel [NHANES]

## 2025-02-11 ASSESSMENT — ENCOUNTER SYMPTOMS
RESPIRATORY NEGATIVE: 1
VISUAL CHANGE: 0

## 2025-02-25 ENCOUNTER — TELEPHONE (OUTPATIENT)
Dept: ENDOCRINOLOGY | Age: 35
End: 2025-02-25

## 2025-02-26 NOTE — TELEPHONE ENCOUNTER
[facility] insurance would not go through for the Ozempic. Need  most recent insurance insormation.

## 2025-02-27 NOTE — TELEPHONE ENCOUNTER
Ozempic 0.25 ir 0.5 mg/dose prior authorization request submitted online ([email or web address] to Caroline), clinical uploaded, medication denied for the following reason(s):    -Coverage is provided when the member meets all the followin. Member has a history of at least 120 days of therapy with THREE preferred medications in this UPDL category. ONE of the 120 day trials must be with a drug in the same drug class, Victoza (18 MG/3 ML PEN) (Brand name is preferred by the plan) or Trulicity (0.75 mg, 1.5 mg, 3 mg, and 4.5 mg)]. Other trials may include but are not limited to: Farxiga 5 and 10 mg (Brand name is preferred by the plan), Glimepiride, Glipizide, Januvia, Jardiance 10 and 25 mg and Metformin  mg, 850 mg, 1000 mg and ER (generics of Glucophage); 2. Member has had an inadequate clinical response (the inability to reach A1C goal (less than 7%) after at least 120 days of current regimen, with use of two or more drugs concomitantly per ADA (American Diabetes Association) guidelines and;   3. Member has documented adherence and appropriate dose escalation (must achieve maximum recommended dose or document that maximum recommended dose is not tolerated or is clinically inappropriate) and;   4. Documentation includes a patient specific A1C goal if less than 7% and must include current A1C (within the last 6 months).     (Key: [id number])  Ozempic (0.25 or 0.5 MG/DOSE) 2MG/3ML pen-injectors

## 2025-03-01 ENCOUNTER — APPOINTMENT (OUTPATIENT)
Dept: GENERAL RADIOLOGY | Age: 35
End: 2025-03-01
Payer: COMMERCIAL

## 2025-03-01 ENCOUNTER — HOSPITAL ENCOUNTER (EMERGENCY)
Age: 35
Discharge: HOME | End: 2025-03-01
Attending: STUDENT IN AN ORGANIZED HEALTH CARE EDUCATION/TRAINING PROGRAM
Payer: COMMERCIAL

## 2025-03-01 ENCOUNTER — APPOINTMENT (OUTPATIENT)
Dept: ULTRASOUND IMAGING | Age: 35
End: 2025-03-01
Payer: COMMERCIAL

## 2025-03-01 VITALS
HEIGHT: 78 IN | BODY MASS INDEX: 36.45 KG/M2 | TEMPERATURE: 97.5 F | SYSTOLIC BLOOD PRESSURE: 122 MMHG | OXYGEN SATURATION: 96 % | DIASTOLIC BLOOD PRESSURE: 93 MMHG | RESPIRATION RATE: 20 BRPM | HEART RATE: 98 BPM | WEIGHT: 315 LBS

## 2025-03-01 DIAGNOSIS — L03.116 CELLULITIS OF LEFT LOWER EXTREMITY: Primary | ICD-10-CM

## 2025-03-01 DIAGNOSIS — I73.9 PERIPHERAL ARTERY DISEASE: ICD-10-CM

## 2025-03-01 LAB
ALBUMIN SERPL-MCNC: 3.5 G/DL (ref 3.5–4.6)
ALP SERPL-CCNC: 110 U/L (ref 35–104)
ALT SERPL-CCNC: 34 U/L (ref 0–41)
ANION GAP SERPL CALCULATED.3IONS-SCNC: 13 MEQ/L (ref 9–15)
APTT PPP: 31 SEC (ref 24.4–36.8)
AST SERPL-CCNC: 20 U/L (ref 0–40)
BASOPHILS # BLD: 0 K/UL (ref 0–0.2)
BASOPHILS NFR BLD: 0.6 %
BILIRUB SERPL-MCNC: 0.3 MG/DL (ref 0.2–0.7)
BNP BLD-MCNC: 528 PG/ML
BUN SERPL-MCNC: 11 MG/DL (ref 6–20)
CALCIUM SERPL-MCNC: 8.2 MG/DL (ref 8.5–9.9)
CHLORIDE SERPL-SCNC: 103 MEQ/L (ref 95–107)
CK SERPL-CCNC: 50 U/L (ref 0–190)
CO2 SERPL-SCNC: 21 MEQ/L (ref 20–31)
CREAT SERPL-MCNC: 0.72 MG/DL (ref 0.7–1.2)
CRP SERPL HS-MCNC: 21.7 MG/L (ref 0–5)
EOSINOPHIL # BLD: 0.4 K/UL (ref 0–0.7)
EOSINOPHIL NFR BLD: 5.9 %
ERYTHROCYTE [DISTWIDTH] IN BLOOD BY AUTOMATED COUNT: 14.7 % (ref 11.5–14.5)
ERYTHROCYTE [SEDIMENTATION RATE] IN BLOOD BY WESTERGREN METHOD: 48 MM (ref 0–10)
GLOBULIN SER CALC-MCNC: 2.5 G/DL (ref 2.3–3.5)
GLUCOSE SERPL-MCNC: 176 MG/DL (ref 70–99)
HCT VFR BLD AUTO: 40.5 % (ref 42–52)
HGB BLD-MCNC: 13.4 G/DL (ref 14–18)
INR PPP: 1.1
LACTIC ACID, SEPSIS: 2.4 MMOL/L (ref 0.5–1.9)
LACTIC ACID, SEPSIS: 2.7 MMOL/L (ref 0.5–1.9)
LYMPHOCYTES # BLD: 2 K/UL (ref 1–4.8)
LYMPHOCYTES NFR BLD: 29.9 %
MAGNESIUM SERPL-MCNC: 1.3 MG/DL (ref 1.7–2.4)
MCH RBC QN AUTO: 27.7 PG (ref 27–31.3)
MCHC RBC AUTO-ENTMCNC: 33.1 % (ref 33–37)
MCV RBC AUTO: 83.9 FL (ref 79–92.2)
MONOCYTES # BLD: 0.5 K/UL (ref 0.2–0.8)
MONOCYTES NFR BLD: 7.4 %
NEUTROPHILS # BLD: 3.8 K/UL (ref 1.4–6.5)
NEUTS SEG NFR BLD: 55.6 %
PLATELET # BLD AUTO: 215 K/UL (ref 130–400)
POTASSIUM SERPL-SCNC: 4.1 MEQ/L (ref 3.4–4.9)
PROT SERPL-MCNC: 6 G/DL (ref 6.3–8)
PROTHROMBIN TIME: 14.2 SEC (ref 12.3–14.9)
RBC # BLD AUTO: 4.83 M/UL (ref 4.7–6.1)
SODIUM SERPL-SCNC: 137 MEQ/L (ref 135–144)
WBC # BLD AUTO: 6.8 K/UL (ref 4.8–10.8)

## 2025-03-01 PROCEDURE — 83735 ASSAY OF MAGNESIUM: CPT

## 2025-03-01 PROCEDURE — 83880 ASSAY OF NATRIURETIC PEPTIDE: CPT

## 2025-03-01 PROCEDURE — 80053 COMPREHEN METABOLIC PANEL: CPT

## 2025-03-01 PROCEDURE — 85610 PROTHROMBIN TIME: CPT

## 2025-03-01 PROCEDURE — 82550 ASSAY OF CK (CPK): CPT

## 2025-03-01 PROCEDURE — 96367 TX/PROPH/DG ADDL SEQ IV INF: CPT

## 2025-03-01 PROCEDURE — 36415 COLL VENOUS BLD VENIPUNCTURE: CPT

## 2025-03-01 PROCEDURE — 96365 THER/PROPH/DIAG IV INF INIT: CPT

## 2025-03-01 PROCEDURE — 85730 THROMBOPLASTIN TIME PARTIAL: CPT

## 2025-03-01 PROCEDURE — 71045 X-RAY EXAM CHEST 1 VIEW: CPT

## 2025-03-01 PROCEDURE — 6370000000 HC RX 637 (ALT 250 FOR IP): Performed by: STUDENT IN AN ORGANIZED HEALTH CARE EDUCATION/TRAINING PROGRAM

## 2025-03-01 PROCEDURE — 2580000003 HC RX 258: Performed by: STUDENT IN AN ORGANIZED HEALTH CARE EDUCATION/TRAINING PROGRAM

## 2025-03-01 PROCEDURE — 93926 LOWER EXTREMITY STUDY: CPT

## 2025-03-01 PROCEDURE — 83605 ASSAY OF LACTIC ACID: CPT

## 2025-03-01 PROCEDURE — 85025 COMPLETE CBC W/AUTO DIFF WBC: CPT

## 2025-03-01 PROCEDURE — 85652 RBC SED RATE AUTOMATED: CPT

## 2025-03-01 PROCEDURE — 2500000003 HC RX 250 WO HCPCS: Performed by: STUDENT IN AN ORGANIZED HEALTH CARE EDUCATION/TRAINING PROGRAM

## 2025-03-01 PROCEDURE — 73590 X-RAY EXAM OF LOWER LEG: CPT

## 2025-03-01 PROCEDURE — 6360000002 HC RX W HCPCS: Performed by: STUDENT IN AN ORGANIZED HEALTH CARE EDUCATION/TRAINING PROGRAM

## 2025-03-01 PROCEDURE — 86140 C-REACTIVE PROTEIN: CPT

## 2025-03-01 PROCEDURE — 99284 EMERGENCY DEPT VISIT MOD MDM: CPT

## 2025-03-01 RX ORDER — ACETAMINOPHEN 500 MG
1000 TABLET ORAL ONCE
Status: COMPLETED | OUTPATIENT
Start: 2025-03-01 | End: 2025-03-01

## 2025-03-01 RX ORDER — DOXYCYCLINE HYCLATE 100 MG
100 TABLET ORAL 2 TIMES DAILY
Qty: 20 TABLET | Refills: 0 | Status: SHIPPED | OUTPATIENT
Start: 2025-03-01 | End: 2025-03-11

## 2025-03-01 RX ORDER — MAGNESIUM SULFATE IN WATER 40 MG/ML
2000 INJECTION, SOLUTION INTRAVENOUS ONCE
Status: COMPLETED | OUTPATIENT
Start: 2025-03-01 | End: 2025-03-01

## 2025-03-01 RX ADMIN — DOXYCYCLINE 100 MG: 100 INJECTION, POWDER, LYOPHILIZED, FOR SOLUTION INTRAVENOUS at 13:32

## 2025-03-01 RX ADMIN — ACETAMINOPHEN 1000 MG: 500 TABLET ORAL at 12:55

## 2025-03-01 RX ADMIN — MAGNESIUM SULFATE HEPTAHYDRATE 2000 MG: 40 INJECTION, SOLUTION INTRAVENOUS at 12:58

## 2025-03-01 ASSESSMENT — PAIN - FUNCTIONAL ASSESSMENT: PAIN_FUNCTIONAL_ASSESSMENT: NONE - DENIES PAIN

## 2025-03-01 NOTE — ED PROVIDER NOTES
Basic Information   Time Seen: 10:45 AM   Primary Care Provider: Carissa Bello RD     Chief Complaint   Patient presents with    Leg Problem     Redness to lower left leg. Pt placed on keflex 4 days ago but not getting better      HPI   Attila Salinas is a 35 yrs male who presents with left lower leg redness and swelling.  Patient states that he has been treating for cellulitis for the past 4 days on Keflex but is not getting any better.  He states that he is able to walk on it with some discomfort.  His antibiotics are done tomorrow.  Denies chest pain, shortness of breath, abdominal pain, headache, fever, chills, nausea, vomiting.   Physical Exam     BP (!) 150/96 (03/01/25 1022)    Temp 97.5 °F (36.4 °C) (03/01/25 1022)    Pulse (!) 115 (03/01/25 1022)   Resp 20 (03/01/25 1022)    SpO2 97 % (03/01/25 1022)       General: Awake and Alert, no acute distress   CV: RRR, S1, S2   Resp: LCTAB, even and non labored   Other: Unable to get distal pulses on bilateral lower extremities.  Doppler used with no success.     Impression and Plan     Labs Reviewed   CBC WITH AUTO DIFFERENTIAL   MAGNESIUM   COMPREHENSIVE METABOLIC PANEL   PROTIME-INR   APTT   LACTATE, SEPSIS   LACTATE, SEPSIS        No orders to display      Final Impression   I have performed a medical screening exam on Attila Salinas. Based on this patient's chief complaint/symptoms of   Chief Complaint   Patient presents with    Leg Problem     Redness to lower left leg. Pt placed on keflex 4 days ago but not getting better    and my focused exam, their care will be started and transitioned to provider when room is available       Tyler Sams PA-C  03/01/25 1044

## 2025-03-01 NOTE — ED PROVIDER NOTES
[facility] EMERGENCY DEPARTMENT  eMERGENCY dEPARTMENT eNCOUnter      Pt Name: Attila Salinas  MRN: [id number]  Birthdate 1990  Date of evaluation: 3/1/2025  Provider: Richard Kilgore MD  Note Started: 3/1/25 1:39 PM EST    HISTORY OF PRESENT ILLNESS      Chief Complaint   Patient presents with    Leg Problem     Redness to lower left leg. Pt placed on keflex 4 days ago but not getting better       The history is provided by the Patient.  Attila Salinas is a 35 y.o. male with a PMH clinically significant for HTN, HLD, DM II, and HF presenting to the ED via Self c/o redness, swelling and pain to the left lower leg ongoing for the past week with redness seeming to extend last night after being on antibiotics for 4 days.  States that he has been taking Keflex regularly.  Initially was having pain associated with the leg, but pain significantly improved and states he is no pain currently.  Only came today because the redness seem to move more towards the posterior aspect of the leg.  Has otherwise been feeling well.  Does have a history of neuropathy as well.  Denies any history of heart failure as far as he is aware.  Denies any associated: F/C/D, HA, Cough, Hemoptysis, SOB, CP, Abd pain, N/V/D/C, Dysuria, Hematuria, or Difficulty urinating.  Denies preceding Trauma. nahpihx: States history of similar prior episode Taking all medications as indicated States they have otherwise been feeling well.  Denies any hx of DVT/PE.    Per Chart Review: PMH as noted above obtained via outpatient chart review.     REVIEW OF SYSTEMS       Review of Systems  Otherwise as noted in HPI    PAST MEDICAL HISTORY     Past Medical History:   Diagnosis Date    Cellulitis     CHF (congestive heart failure) (HCC)     Diabetes mellitus (HCC)     Hyperlipidemia     Hypertension        SURGICAL HISTORY     History reviewed. No pertinent surgical history.    FAMILY HISTORY     History reviewed. No pertinent family history.    SOCIAL HISTORY  Details   doxycycline hyclate (VIBRA-TABS) 100 MG tablet Take 1 tablet by mouth 2 times daily for 10 days, Disp-20 tablet, R-0Normal              Richard Kilgore MD  Emergency Medicine Attending Physician    (Please note that portions of this note were completed with a voice recognition program.  Efforts were made to edit the dictations but occasionally words are mis-transcribed.)     Richard Kilgore MD  03/01/25 0341

## 2025-03-01 NOTE — ED TRIAGE NOTES
Pt comes to er with c/o  infection to lower left leg.  Pt had cellulitis in the same spot last year. Pt was placed on antibiotics 4 days ago but it seems to be getting larger in size

## 2025-03-23 ENCOUNTER — APPOINTMENT (OUTPATIENT)
Dept: GENERAL RADIOLOGY | Age: 35
DRG: 194 | End: 2025-03-23
Payer: COMMERCIAL

## 2025-03-23 ENCOUNTER — HOSPITAL ENCOUNTER (INPATIENT)
Age: 35
LOS: 4 days | Discharge: HOME | DRG: 194 | End: 2025-03-28
Attending: INTERNAL MEDICINE | Admitting: INTERNAL MEDICINE
Payer: COMMERCIAL

## 2025-03-23 ENCOUNTER — APPOINTMENT (OUTPATIENT)
Dept: CT IMAGING | Age: 35
DRG: 194 | End: 2025-03-23
Payer: COMMERCIAL

## 2025-03-23 DIAGNOSIS — I50.23 ACUTE ON CHRONIC HFREF (HEART FAILURE WITH REDUCED EJECTION FRACTION) (HCC): ICD-10-CM

## 2025-03-23 DIAGNOSIS — R06.02 SHORTNESS OF BREATH: ICD-10-CM

## 2025-03-23 DIAGNOSIS — I50.21 ACUTE SYSTOLIC CONGESTIVE HEART FAILURE (HCC): ICD-10-CM

## 2025-03-23 DIAGNOSIS — J96.01 ACUTE RESPIRATORY FAILURE WITH HYPOXIA: Primary | ICD-10-CM

## 2025-03-23 LAB
ALBUMIN SERPL-MCNC: 3.6 G/DL (ref 3.5–4.6)
ALP SERPL-CCNC: 123 U/L (ref 35–104)
ALT SERPL-CCNC: 25 U/L (ref 0–41)
ANION GAP SERPL CALCULATED.3IONS-SCNC: 16 MEQ/L (ref 9–15)
AST SERPL-CCNC: 25 U/L (ref 0–40)
B PARAP IS1001 DNA NPH QL NAA+NON-PROBE: NOT DETECTED
B PERT.PT PRMT NPH QL NAA+NON-PROBE: NOT DETECTED
BASOPHILS # BLD: 0 K/UL (ref 0–0.2)
BASOPHILS NFR BLD: 0.2 %
BILIRUB SERPL-MCNC: 0.5 MG/DL (ref 0.2–0.7)
BNP BLD-MCNC: 1748 PG/ML
BUN SERPL-MCNC: 16 MG/DL (ref 6–20)
C PNEUM DNA NPH QL NAA+NON-PROBE: NOT DETECTED
CALCIUM SERPL-MCNC: 8.6 MG/DL (ref 8.5–9.9)
CHLORIDE SERPL-SCNC: 102 MEQ/L (ref 95–107)
CO2 SERPL-SCNC: 20 MEQ/L (ref 20–31)
CREAT SERPL-MCNC: 0.62 MG/DL (ref 0.7–1.2)
EOSINOPHIL # BLD: 0.2 K/UL (ref 0–0.7)
EOSINOPHIL NFR BLD: 1.5 %
ERYTHROCYTE [DISTWIDTH] IN BLOOD BY AUTOMATED COUNT: 16.1 % (ref 11.5–14.5)
FLUAV RNA NPH QL NAA+NON-PROBE: NOT DETECTED
FLUBV RNA NPH QL NAA+NON-PROBE: NOT DETECTED
GLOBULIN SER CALC-MCNC: 2.6 G/DL (ref 2.3–3.5)
GLUCOSE SERPL-MCNC: 200 MG/DL (ref 70–99)
HADV DNA NPH QL NAA+NON-PROBE: NOT DETECTED
HCOV 229E RNA NPH QL NAA+NON-PROBE: NOT DETECTED
HCOV HKU1 RNA NPH QL NAA+NON-PROBE: NOT DETECTED
HCOV NL63 RNA NPH QL NAA+NON-PROBE: NOT DETECTED
HCOV OC43 RNA NPH QL NAA+NON-PROBE: NOT DETECTED
HCT VFR BLD AUTO: 36.6 % (ref 42–52)
HGB BLD-MCNC: 12.1 G/DL (ref 14–18)
HMPV RNA NPH QL NAA+NON-PROBE: NOT DETECTED
HPIV1 RNA NPH QL NAA+NON-PROBE: NOT DETECTED
HPIV2 RNA NPH QL NAA+NON-PROBE: NOT DETECTED
HPIV3 RNA NPH QL NAA+NON-PROBE: NOT DETECTED
HPIV4 RNA NPH QL NAA+NON-PROBE: NOT DETECTED
INR PPP: 1.2
LACTIC ACID, SEPSIS: 1.8 MMOL/L (ref 0.5–1.9)
LACTIC ACID, SEPSIS: 2 MMOL/L (ref 0.5–1.9)
LYMPHOCYTES # BLD: 1.9 K/UL (ref 1–4.8)
LYMPHOCYTES NFR BLD: 19.1 %
M PNEUMO DNA NPH QL NAA+NON-PROBE: NOT DETECTED
MAGNESIUM SERPL-MCNC: 1.6 MG/DL (ref 1.7–2.4)
MCH RBC QN AUTO: 27.6 PG (ref 27–31.3)
MCHC RBC AUTO-ENTMCNC: 33.1 % (ref 33–37)
MCV RBC AUTO: 83.6 FL (ref 79–92.2)
MONOCYTES # BLD: 0.5 K/UL (ref 0.2–0.8)
MONOCYTES NFR BLD: 4.7 %
NEUTROPHILS # BLD: 7.5 K/UL (ref 1.4–6.5)
NEUTS SEG NFR BLD: 74.1 %
PLATELET # BLD AUTO: 333 K/UL (ref 130–400)
POTASSIUM SERPL-SCNC: 3.5 MEQ/L (ref 3.4–4.9)
PROCALCITONIN SERPL IA-MCNC: 0.05 NG/ML (ref 0–0.15)
PROT SERPL-MCNC: 6.2 G/DL (ref 6.3–8)
PROTHROMBIN TIME: 15.2 SEC (ref 12.3–14.9)
RBC # BLD AUTO: 4.38 M/UL (ref 4.7–6.1)
RSV RNA NPH QL NAA+NON-PROBE: NOT DETECTED
RV+EV RNA NPH QL NAA+NON-PROBE: NOT DETECTED
SARS-COV-2 RNA NPH QL NAA+NON-PROBE: NOT DETECTED
SODIUM SERPL-SCNC: 138 MEQ/L (ref 135–144)
TROPONIN, HIGH SENSITIVITY: 21 NG/L (ref 0–19)
TROPONIN, HIGH SENSITIVITY: 21 NG/L (ref 0–19)
WBC # BLD AUTO: 10.1 K/UL (ref 4.8–10.8)

## 2025-03-23 PROCEDURE — 71045 X-RAY EXAM CHEST 1 VIEW: CPT

## 2025-03-23 PROCEDURE — 6360000002 HC RX W HCPCS: Performed by: PHYSICIAN ASSISTANT

## 2025-03-23 PROCEDURE — 6360000004 HC RX CONTRAST MEDICATION: Performed by: PHYSICIAN ASSISTANT

## 2025-03-23 PROCEDURE — 71275 CT ANGIOGRAPHY CHEST: CPT

## 2025-03-23 PROCEDURE — 83605 ASSAY OF LACTIC ACID: CPT

## 2025-03-23 PROCEDURE — 0202U NFCT DS 22 TRGT SARS-COV-2: CPT

## 2025-03-23 PROCEDURE — 84145 PROCALCITONIN (PCT): CPT

## 2025-03-23 PROCEDURE — 83735 ASSAY OF MAGNESIUM: CPT

## 2025-03-23 PROCEDURE — 85025 COMPLETE CBC W/AUTO DIFF WBC: CPT

## 2025-03-23 PROCEDURE — 93005 ELECTROCARDIOGRAM TRACING: CPT | Performed by: PHYSICIAN ASSISTANT

## 2025-03-23 PROCEDURE — 96374 THER/PROPH/DIAG INJ IV PUSH: CPT

## 2025-03-23 PROCEDURE — 84484 ASSAY OF TROPONIN QUANT: CPT

## 2025-03-23 PROCEDURE — 83880 ASSAY OF NATRIURETIC PEPTIDE: CPT

## 2025-03-23 PROCEDURE — 99285 EMERGENCY DEPT VISIT HI MDM: CPT

## 2025-03-23 PROCEDURE — 80053 COMPREHEN METABOLIC PANEL: CPT

## 2025-03-23 PROCEDURE — 85610 PROTHROMBIN TIME: CPT

## 2025-03-23 RX ORDER — FUROSEMIDE 10 MG/ML
40 INJECTION INTRAMUSCULAR; INTRAVENOUS ONCE
Status: COMPLETED | OUTPATIENT
Start: 2025-03-23 | End: 2025-03-23

## 2025-03-23 RX ORDER — IOPAMIDOL 755 MG/ML
100 INJECTION, SOLUTION INTRAVASCULAR
Status: COMPLETED | OUTPATIENT
Start: 2025-03-23 | End: 2025-03-23

## 2025-03-23 RX ADMIN — IOPAMIDOL 100 ML: 755 INJECTION, SOLUTION INTRAVENOUS at 22:47

## 2025-03-23 RX ADMIN — FUROSEMIDE 40 MG: 10 INJECTION, SOLUTION INTRAMUSCULAR; INTRAVENOUS at 23:45

## 2025-03-24 ENCOUNTER — APPOINTMENT (OUTPATIENT)
Age: 35
DRG: 194 | End: 2025-03-24
Payer: COMMERCIAL

## 2025-03-24 PROBLEM — I50.23 ACUTE ON CHRONIC HFREF (HEART FAILURE WITH REDUCED EJECTION FRACTION) (HCC): Status: ACTIVE | Noted: 2025-03-24

## 2025-03-24 PROBLEM — I50.41 CHF (CONGESTIVE HEART FAILURE), NYHA CLASS IV, ACUTE, COMBINED (HCC): Status: ACTIVE | Noted: 2025-03-24

## 2025-03-24 LAB
ALBUMIN SERPL-MCNC: 3.3 G/DL (ref 3.5–4.6)
ALP SERPL-CCNC: 117 U/L (ref 35–104)
ALT SERPL-CCNC: 22 U/L (ref 0–41)
ANION GAP SERPL CALCULATED.3IONS-SCNC: 13 MEQ/L (ref 9–15)
AST SERPL-CCNC: 20 U/L (ref 0–40)
BASOPHILS # BLD: 0 K/UL (ref 0–0.2)
BASOPHILS NFR BLD: 0.3 %
BILIRUB SERPL-MCNC: 0.7 MG/DL (ref 0.2–0.7)
BUN SERPL-MCNC: 13 MG/DL (ref 6–20)
CALCIUM SERPL-MCNC: 8.7 MG/DL (ref 8.5–9.9)
CHLORIDE SERPL-SCNC: 103 MEQ/L (ref 95–107)
CO2 SERPL-SCNC: 23 MEQ/L (ref 20–31)
CREAT SERPL-MCNC: 0.65 MG/DL (ref 0.7–1.2)
ECHO AO ROOT DIAM: 3.6 CM
ECHO AO ROOT INDEX: 0.97 CM/M2
ECHO AV AREA PEAK VELOCITY: 3.2 CM2
ECHO AV AREA VTI: 3.3 CM2
ECHO AV AREA/BSA PEAK VELOCITY: 0.9 CM2/M2
ECHO AV AREA/BSA VTI: 0.9 CM2/M2
ECHO AV CUSP MM: 2.3 CM
ECHO AV MEAN GRADIENT: 5 MMHG
ECHO AV MEAN VELOCITY: 1.1 M/S
ECHO AV PEAK GRADIENT: 8 MMHG
ECHO AV PEAK VELOCITY: 1.5 M/S
ECHO AV VELOCITY RATIO: 0.67
ECHO AV VTI: 29.1 CM
ECHO BSA: 3.92 M2
ECHO LA DIAMETER INDEX: 1.42 CM/M2
ECHO LA DIAMETER: 5.3 CM
ECHO LA TO AORTIC ROOT RATIO: 1.47
ECHO LA VOL A-L A2C: 145 ML (ref 18–58)
ECHO LA VOL A-L A4C: 158 ML (ref 18–58)
ECHO LA VOL MOD A2C: 134 ML (ref 18–58)
ECHO LA VOL MOD A4C: 148 ML (ref 18–58)
ECHO LA VOLUME AREA LENGTH: 158 ML
ECHO LA VOLUME INDEX A-L A2C: 39 ML/M2 (ref 16–34)
ECHO LA VOLUME INDEX A-L A4C: 42 ML/M2 (ref 16–34)
ECHO LA VOLUME INDEX AREA LENGTH: 42 ML/M2 (ref 16–34)
ECHO LA VOLUME INDEX MOD A2C: 36 ML/M2 (ref 16–34)
ECHO LA VOLUME INDEX MOD A4C: 40 ML/M2 (ref 16–34)
ECHO LV E' LATERAL VELOCITY: 9.4 CM/S
ECHO LV E' SEPTAL VELOCITY: 8.9 CM/S
ECHO LV EDV A2C: 264 ML
ECHO LV EDV A4C: 312 ML
ECHO LV EDV BP: 292 ML (ref 67–155)
ECHO LV EDV INDEX A4C: 84 ML/M2
ECHO LV EDV INDEX BP: 78 ML/M2
ECHO LV EDV NDEX A2C: 71 ML/M2
ECHO LV EF PHYSICIAN: 30 %
ECHO LV EJECTION FRACTION A2C: 47 %
ECHO LV EJECTION FRACTION A4C: 25 %
ECHO LV EJECTION FRACTION BIPLANE: 35 % (ref 55–100)
ECHO LV ESV A2C: 140 ML
ECHO LV ESV A4C: 232 ML
ECHO LV ESV BP: 188 ML (ref 22–58)
ECHO LV ESV INDEX A2C: 38 ML/M2
ECHO LV ESV INDEX A4C: 62 ML/M2
ECHO LV ESV INDEX BP: 51 ML/M2
ECHO LV FRACTIONAL SHORTENING: 17 % (ref 28–44)
ECHO LV INTERNAL DIMENSION DIASTOLE INDEX: 1.88 CM/M2
ECHO LV INTERNAL DIMENSION DIASTOLIC: 7 CM (ref 4.2–5.9)
ECHO LV INTERNAL DIMENSION SYSTOLIC INDEX: 1.56 CM/M2
ECHO LV INTERNAL DIMENSION SYSTOLIC: 5.8 CM
ECHO LV IVSD: 1 CM (ref 0.6–1)
ECHO LV IVSS: 1.6 CM
ECHO LV MASS 2D: 384.4 G (ref 88–224)
ECHO LV MASS INDEX 2D: 103.3 G/M2 (ref 49–115)
ECHO LV POSTERIOR WALL DIASTOLIC: 1.3 CM (ref 0.6–1)
ECHO LV POSTERIOR WALL SYSTOLIC: 1.6 CM
ECHO LV RELATIVE WALL THICKNESS RATIO: 0.37
ECHO LVOT AREA: 4.5 CM2
ECHO LVOT AV VTI INDEX: 0.74
ECHO LVOT DIAM: 2.4 CM
ECHO LVOT MEAN GRADIENT: 2 MMHG
ECHO LVOT PEAK GRADIENT: 4 MMHG
ECHO LVOT PEAK VELOCITY: 1 M/S
ECHO LVOT STROKE VOLUME INDEX: 26.3 ML/M2
ECHO LVOT SV: 97.7 ML
ECHO LVOT VTI: 21.6 CM
ECHO MV AREA VTI: 3.2 CM2
ECHO MV E VELOCITY: 1.37 M/S
ECHO MV E/E' LATERAL: 14.57
ECHO MV E/E' RATIO (AVERAGED): 14.98
ECHO MV E/E' SEPTAL: 15.39
ECHO MV LVOT VTI INDEX: 1.42
ECHO MV MAX VELOCITY: 1.5 M/S
ECHO MV MEAN GRADIENT: 4 MMHG
ECHO MV MEAN VELOCITY: 1 M/S
ECHO MV PEAK GRADIENT: 9 MMHG
ECHO MV VTI: 30.6 CM
ECHO PULMONARY ARTERY END DIASTOLIC PRESSURE: 18 MMHG
ECHO PV MAX VELOCITY: 1.3 M/S
ECHO PV PEAK GRADIENT: 7 MMHG
ECHO PV REGURGITANT MAX VELOCITY: 2.1 M/S
ECHO RV INTERNAL DIMENSION: 4.8 CM
ECHO RV TAPSE: 2.9 CM (ref 1.7–?)
EKG ATRIAL RATE: 109 BPM
EKG P AXIS: 52 DEGREES
EKG P-R INTERVAL: 158 MS
EKG Q-T INTERVAL: 384 MS
EKG QRS DURATION: 106 MS
EKG QTC CALCULATION (BAZETT): 517 MS
EKG R AXIS: -38 DEGREES
EKG T AXIS: 78 DEGREES
EKG VENTRICULAR RATE: 109 BPM
EOSINOPHIL # BLD: 0.2 K/UL (ref 0–0.7)
EOSINOPHIL NFR BLD: 2.1 %
ERYTHROCYTE [DISTWIDTH] IN BLOOD BY AUTOMATED COUNT: 16.2 % (ref 11.5–14.5)
GLOBULIN SER CALC-MCNC: 3.3 G/DL (ref 2.3–3.5)
GLUCOSE BLD-MCNC: 163 MG/DL (ref 70–99)
GLUCOSE BLD-MCNC: 169 MG/DL (ref 70–99)
GLUCOSE BLD-MCNC: 171 MG/DL (ref 70–99)
GLUCOSE BLD-MCNC: 172 MG/DL (ref 70–99)
GLUCOSE BLD-MCNC: 174 MG/DL (ref 70–99)
GLUCOSE SERPL-MCNC: 158 MG/DL (ref 70–99)
HCT VFR BLD AUTO: 36.3 % (ref 42–52)
HGB BLD-MCNC: 11.7 G/DL (ref 14–18)
LYMPHOCYTES # BLD: 2 K/UL (ref 1–4.8)
LYMPHOCYTES NFR BLD: 20.6 %
MAGNESIUM SERPL-MCNC: 1.8 MG/DL (ref 1.7–2.4)
MCH RBC QN AUTO: 26.7 PG (ref 27–31.3)
MCHC RBC AUTO-ENTMCNC: 32.2 % (ref 33–37)
MCV RBC AUTO: 82.7 FL (ref 79–92.2)
MONOCYTES # BLD: 0.6 K/UL (ref 0.2–0.8)
MONOCYTES NFR BLD: 5.8 %
NEUTROPHILS # BLD: 6.8 K/UL (ref 1.4–6.5)
NEUTS SEG NFR BLD: 70.8 %
PERFORMED ON: ABNORMAL
PLATELET # BLD AUTO: 327 K/UL (ref 130–400)
POC CREATININE: 0.7 MG/DL (ref 0.8–1.3)
POC SAMPLE TYPE: ABNORMAL
POTASSIUM SERPL-SCNC: 3.5 MEQ/L (ref 3.4–4.9)
PROT SERPL-MCNC: 6.6 G/DL (ref 6.3–8)
RBC # BLD AUTO: 4.39 M/UL (ref 4.7–6.1)
SODIUM SERPL-SCNC: 139 MEQ/L (ref 135–144)
WBC # BLD AUTO: 9.6 K/UL (ref 4.8–10.8)

## 2025-03-24 PROCEDURE — 2060000000 HC ICU INTERMEDIATE R&B

## 2025-03-24 PROCEDURE — 85025 COMPLETE CBC W/AUTO DIFF WBC: CPT

## 2025-03-24 PROCEDURE — 36415 COLL VENOUS BLD VENIPUNCTURE: CPT

## 2025-03-24 PROCEDURE — 6370000000 HC RX 637 (ALT 250 FOR IP): Performed by: INTERNAL MEDICINE

## 2025-03-24 PROCEDURE — C8929 TTE W OR WO FOL WCON,DOPPLER: HCPCS

## 2025-03-24 PROCEDURE — 99223 1ST HOSP IP/OBS HIGH 75: CPT | Performed by: INTERNAL MEDICINE

## 2025-03-24 PROCEDURE — APPSS45 APP SPLIT SHARED TIME 31-45 MINUTES

## 2025-03-24 PROCEDURE — 80053 COMPREHEN METABOLIC PANEL: CPT

## 2025-03-24 PROCEDURE — 93010 ELECTROCARDIOGRAM REPORT: CPT | Performed by: INTERNAL MEDICINE

## 2025-03-24 PROCEDURE — 6360000002 HC RX W HCPCS: Performed by: NURSE PRACTITIONER

## 2025-03-24 PROCEDURE — 83735 ASSAY OF MAGNESIUM: CPT

## 2025-03-24 PROCEDURE — 6370000000 HC RX 637 (ALT 250 FOR IP): Performed by: NURSE PRACTITIONER

## 2025-03-24 PROCEDURE — 6360000002 HC RX W HCPCS: Performed by: INTERNAL MEDICINE

## 2025-03-24 PROCEDURE — 2500000003 HC RX 250 WO HCPCS: Performed by: NURSE PRACTITIONER

## 2025-03-24 PROCEDURE — 93306 TTE W/DOPPLER COMPLETE: CPT | Performed by: INTERNAL MEDICINE

## 2025-03-24 RX ORDER — ASPIRIN 81 MG/1
81 TABLET, CHEWABLE ORAL DAILY
Status: DISCONTINUED | OUTPATIENT
Start: 2025-03-24 | End: 2025-03-28 | Stop reason: HOSPADM

## 2025-03-24 RX ORDER — GLUCAGON 1 MG/ML
1 KIT INJECTION PRN
Status: DISCONTINUED | OUTPATIENT
Start: 2025-03-24 | End: 2025-03-28 | Stop reason: HOSPADM

## 2025-03-24 RX ORDER — ONDANSETRON 4 MG/1
4 TABLET, ORALLY DISINTEGRATING ORAL EVERY 8 HOURS PRN
Status: DISCONTINUED | OUTPATIENT
Start: 2025-03-24 | End: 2025-03-28 | Stop reason: HOSPADM

## 2025-03-24 RX ORDER — INSULIN LISPRO 100 [IU]/ML
0-8 INJECTION, SOLUTION INTRAVENOUS; SUBCUTANEOUS
Status: DISCONTINUED | OUTPATIENT
Start: 2025-03-24 | End: 2025-03-28 | Stop reason: HOSPADM

## 2025-03-24 RX ORDER — FUROSEMIDE 10 MG/ML
20 INJECTION INTRAMUSCULAR; INTRAVENOUS ONCE
Status: COMPLETED | OUTPATIENT
Start: 2025-03-24 | End: 2025-03-24

## 2025-03-24 RX ORDER — SODIUM CHLORIDE 9 MG/ML
INJECTION, SOLUTION INTRAVENOUS PRN
Status: DISCONTINUED | OUTPATIENT
Start: 2025-03-24 | End: 2025-03-28 | Stop reason: HOSPADM

## 2025-03-24 RX ORDER — ALBUTEROL SULFATE 90 UG/1
2 INHALANT RESPIRATORY (INHALATION) EVERY 6 HOURS PRN
Status: DISCONTINUED | OUTPATIENT
Start: 2025-03-24 | End: 2025-03-28 | Stop reason: HOSPADM

## 2025-03-24 RX ORDER — POTASSIUM CHLORIDE 1500 MG/1
40 TABLET, EXTENDED RELEASE ORAL PRN
Status: DISCONTINUED | OUTPATIENT
Start: 2025-03-24 | End: 2025-03-28 | Stop reason: HOSPADM

## 2025-03-24 RX ORDER — FUROSEMIDE 10 MG/ML
80 INJECTION INTRAMUSCULAR; INTRAVENOUS 2 TIMES DAILY
Status: DISCONTINUED | OUTPATIENT
Start: 2025-03-24 | End: 2025-03-28 | Stop reason: HOSPADM

## 2025-03-24 RX ORDER — DEXTROSE MONOHYDRATE 100 MG/ML
INJECTION, SOLUTION INTRAVENOUS CONTINUOUS PRN
Status: DISCONTINUED | OUTPATIENT
Start: 2025-03-24 | End: 2025-03-28 | Stop reason: HOSPADM

## 2025-03-24 RX ORDER — MAGNESIUM SULFATE IN WATER 40 MG/ML
2000 INJECTION, SOLUTION INTRAVENOUS ONCE
Status: COMPLETED | OUTPATIENT
Start: 2025-03-24 | End: 2025-03-24

## 2025-03-24 RX ORDER — FUROSEMIDE 10 MG/ML
40 INJECTION INTRAMUSCULAR; INTRAVENOUS 2 TIMES DAILY
Status: DISCONTINUED | OUTPATIENT
Start: 2025-03-24 | End: 2025-03-24

## 2025-03-24 RX ORDER — LABETALOL HYDROCHLORIDE 5 MG/ML
10 INJECTION, SOLUTION INTRAVENOUS EVERY 4 HOURS PRN
Status: DISCONTINUED | OUTPATIENT
Start: 2025-03-24 | End: 2025-03-28 | Stop reason: HOSPADM

## 2025-03-24 RX ORDER — ATORVASTATIN CALCIUM 10 MG/1
10 TABLET, FILM COATED ORAL NIGHTLY
Status: DISCONTINUED | OUTPATIENT
Start: 2025-03-24 | End: 2025-03-28 | Stop reason: HOSPADM

## 2025-03-24 RX ORDER — POTASSIUM CHLORIDE 7.45 MG/ML
10 INJECTION INTRAVENOUS PRN
Status: DISCONTINUED | OUTPATIENT
Start: 2025-03-24 | End: 2025-03-28 | Stop reason: HOSPADM

## 2025-03-24 RX ORDER — LOSARTAN POTASSIUM 25 MG/1
25 TABLET ORAL DAILY
Status: DISCONTINUED | OUTPATIENT
Start: 2025-03-24 | End: 2025-03-24

## 2025-03-24 RX ORDER — ACETAMINOPHEN 325 MG/1
650 TABLET ORAL EVERY 6 HOURS PRN
Status: DISCONTINUED | OUTPATIENT
Start: 2025-03-24 | End: 2025-03-28 | Stop reason: HOSPADM

## 2025-03-24 RX ORDER — CARVEDILOL 3.12 MG/1
3.12 TABLET ORAL 2 TIMES DAILY WITH MEALS
Status: DISCONTINUED | OUTPATIENT
Start: 2025-03-24 | End: 2025-03-28 | Stop reason: HOSPADM

## 2025-03-24 RX ORDER — ONDANSETRON 2 MG/ML
4 INJECTION INTRAMUSCULAR; INTRAVENOUS EVERY 6 HOURS PRN
Status: DISCONTINUED | OUTPATIENT
Start: 2025-03-24 | End: 2025-03-28 | Stop reason: HOSPADM

## 2025-03-24 RX ORDER — ENOXAPARIN SODIUM 100 MG/ML
60 INJECTION SUBCUTANEOUS 2 TIMES DAILY
Status: DISCONTINUED | OUTPATIENT
Start: 2025-03-24 | End: 2025-03-28 | Stop reason: HOSPADM

## 2025-03-24 RX ORDER — SODIUM CHLORIDE 0.9 % (FLUSH) 0.9 %
5-40 SYRINGE (ML) INJECTION EVERY 12 HOURS SCHEDULED
Status: DISCONTINUED | OUTPATIENT
Start: 2025-03-24 | End: 2025-03-28 | Stop reason: HOSPADM

## 2025-03-24 RX ORDER — MAGNESIUM SULFATE IN WATER 40 MG/ML
2000 INJECTION, SOLUTION INTRAVENOUS PRN
Status: DISCONTINUED | OUTPATIENT
Start: 2025-03-24 | End: 2025-03-28 | Stop reason: HOSPADM

## 2025-03-24 RX ORDER — SODIUM CHLORIDE 0.9 % (FLUSH) 0.9 %
5-40 SYRINGE (ML) INJECTION PRN
Status: DISCONTINUED | OUTPATIENT
Start: 2025-03-24 | End: 2025-03-28 | Stop reason: HOSPADM

## 2025-03-24 RX ORDER — ACETAMINOPHEN 650 MG/1
650 SUPPOSITORY RECTAL EVERY 6 HOURS PRN
Status: DISCONTINUED | OUTPATIENT
Start: 2025-03-24 | End: 2025-03-28 | Stop reason: HOSPADM

## 2025-03-24 RX ORDER — POLYETHYLENE GLYCOL 3350 17 G/17G
17 POWDER, FOR SOLUTION ORAL DAILY PRN
Status: DISCONTINUED | OUTPATIENT
Start: 2025-03-24 | End: 2025-03-28 | Stop reason: HOSPADM

## 2025-03-24 RX ORDER — METOPROLOL TARTRATE 25 MG/1
12.5 TABLET, FILM COATED ORAL 2 TIMES DAILY
Status: DISCONTINUED | OUTPATIENT
Start: 2025-03-24 | End: 2025-03-24

## 2025-03-24 RX ADMIN — CARVEDILOL 3.12 MG: 3.12 TABLET, FILM COATED ORAL at 21:04

## 2025-03-24 RX ADMIN — FUROSEMIDE 20 MG: 10 INJECTION, SOLUTION INTRAMUSCULAR; INTRAVENOUS at 02:17

## 2025-03-24 RX ADMIN — Medication 10 ML: at 21:07

## 2025-03-24 RX ADMIN — FUROSEMIDE 40 MG: 10 INJECTION, SOLUTION INTRAMUSCULAR; INTRAVENOUS at 11:57

## 2025-03-24 RX ADMIN — FUROSEMIDE 80 MG: 10 INJECTION, SOLUTION INTRAMUSCULAR; INTRAVENOUS at 18:27

## 2025-03-24 RX ADMIN — LABETALOL HYDROCHLORIDE 10 MG: 5 INJECTION, SOLUTION INTRAVENOUS at 01:33

## 2025-03-24 RX ADMIN — MAGNESIUM SULFATE HEPTAHYDRATE 2000 MG: 40 INJECTION, SOLUTION INTRAVENOUS at 01:42

## 2025-03-24 RX ADMIN — METOPROLOL TARTRATE 12.5 MG: 25 TABLET, FILM COATED ORAL at 02:18

## 2025-03-24 RX ADMIN — LOSARTAN POTASSIUM 25 MG: 25 TABLET, FILM COATED ORAL at 09:26

## 2025-03-24 RX ADMIN — METOPROLOL TARTRATE 12.5 MG: 25 TABLET, FILM COATED ORAL at 09:26

## 2025-03-24 RX ADMIN — ATORVASTATIN CALCIUM 10 MG: 10 TABLET, FILM COATED ORAL at 02:18

## 2025-03-24 RX ADMIN — SACUBITRIL AND VALSARTAN 1 TABLET: 24; 26 TABLET, FILM COATED ORAL at 21:03

## 2025-03-24 RX ADMIN — ENOXAPARIN SODIUM 60 MG: 100 INJECTION SUBCUTANEOUS at 09:25

## 2025-03-24 RX ADMIN — ASPIRIN 81 MG: 81 TABLET, CHEWABLE ORAL at 02:18

## 2025-03-24 RX ADMIN — Medication 10 ML: at 09:26

## 2025-03-24 RX ADMIN — ENOXAPARIN SODIUM 60 MG: 100 INJECTION SUBCUTANEOUS at 21:04

## 2025-03-24 RX ADMIN — ATORVASTATIN CALCIUM 10 MG: 10 TABLET, FILM COATED ORAL at 21:04

## 2025-03-24 ASSESSMENT — ENCOUNTER SYMPTOMS
BLOOD IN STOOL: 0
WHEEZING: 0
CHEST TIGHTNESS: 0
RHINORRHEA: 0
ABDOMINAL PAIN: 0
NAUSEA: 0
SORE THROAT: 0
DIARRHEA: 0
COUGH: 0
SHORTNESS OF BREATH: 1
BACK PAIN: 0
VOMITING: 0

## 2025-03-24 ASSESSMENT — PAIN SCALES - GENERAL
PAINLEVEL_OUTOF10: 4
PAINLEVEL_OUTOF10: 0

## 2025-03-24 NOTE — ED PROVIDER NOTES
[facility] EMERGENCY DEPARTMENT  eMERGENCYdEPARTMENT eNCOUnter        Pt Name: Attila Salinas  MRN: [id number]  Birthdate 1990of evaluation: 3/23/2025  Provider:Lionel Ayers PA-C  8:53 PM EDT    CHIEF COMPLAINT       Chief Complaint   Patient presents with    Shortness of Breath         HISTORY OF PRESENT ILLNESS  (Location/Symptom, Timing/Onset, Context/Setting, Quality, Duration, Modifying Factors, Severity.)   Attila Salinas is a 35 y.o. male who presents to the emergency department      With chief complaint of shortness of breath.  He states this has been ongoing for approximately 9 days and progressively worsening.  He states it began as a GI bug which she describes as cough, crampy abdominal pain and diarrhea but the majority of the symptoms have resolved.  His main complaint now is shortness of breath.  He has a history of hypoxemic respiratory failure attributed to COVID-19 requiring intubation in the past.  He does report lower extremity edema which is somewhat stable to the knees down but over the past week he has had swelling extending up into the upper thighs which is new for him.  He states he is able to breathe easier sitting upright.  Difficulty lying flat.  He was previously recommended to be on BiPAP though was not covered by insurance.  He has a known history of HFrEF dating back to 202. has an upcoming appointment with cardiology        TTE 4/6/2021:  Impression:   1. Dilated Four Chamber Cardiomyopathy.   2. LVEF less than 20%.   3. Mild LV hypertrophy with diastolic dysfunction.   4. No significant valvular heart disease.   5. Normal pericardium.   6. Moderate PHTN, RVSP 47.   Comments:   Clinical correlation is advised.      Nursing Notes were reviewed and I agree.    REVIEW OF SYSTEMS    (2-9 systems for level 4, 10 or more for level 5)     Review of Systems   All other systems reviewed and are negative.       as noted above the remainder of the review of systems was reviewed and       Right Ear: Tympanic membrane, ear canal and external ear normal.      Left Ear: Tympanic membrane, ear canal and external ear normal.      Nose: Nose normal. No congestion.      Mouth/Throat:      Mouth: Mucous membranes are moist.      Pharynx: Oropharynx is clear.   Eyes:      General: No scleral icterus.     Extraocular Movements: Extraocular movements intact.      Conjunctiva/sclera: Conjunctivae normal.      Pupils: Pupils are equal, round, and reactive to light.   Cardiovascular:      Rate and Rhythm: Normal rate.      Pulses: Normal pulses.      Heart sounds: Normal heart sounds. No murmur heard.     No friction rub. No gallop.   Pulmonary:      Effort: Pulmonary effort is normal. No respiratory distress.      Breath sounds: Normal breath sounds. No stridor.   Abdominal:      General: Abdomen is flat. Bowel sounds are normal.      Palpations: Abdomen is soft.      Tenderness: There is no abdominal tenderness. There is no guarding.   Musculoskeletal:         General: No swelling, tenderness, deformity or signs of injury. Normal range of motion.      Cervical back: Normal range of motion and neck supple. No rigidity or tenderness.      Right lower leg: No edema.      Left lower leg: No edema.   Lymphadenopathy:      Cervical: No cervical adenopathy.   Skin:     General: Skin is warm and dry.      Capillary Refill: Capillary refill takes less than 2 seconds.      Coloration: Skin is not jaundiced or pale.      Findings: No lesion.   Neurological:      General: No focal deficit present.      Mental Status: He is alert and oriented to person, place, and time. Mental status is at baseline.      Cranial Nerves: No cranial nerve deficit.      Sensory: No sensory deficit.      Motor: No weakness.      Coordination: Coordination normal.   Psychiatric:         Mood and Affect: Mood normal.         Behavior: Behavior normal.         Thought Content: Thought content normal.         Judgment: Judgment normal.

## 2025-03-24 NOTE — CONSULTS
UROLOGY TRANSFER OF CARE:    INDICATION FOR CARE TRANSFER: Hematuria  REQUESTING PRACTITIONER:  Jeaneth Ray MD  CONSULTANT:    Dr. Coffey  DATE:     3/11/2025    HISTORY:  Samara Gutiérrez is a 60 year old female with past medical history significant for endometrial cancer with peritoneal carcinamatosis S/p open SCOTT/BSO with tumor debulking and large bowel resection with diverting ileostomy (1/13/25) with post operative DARWIN S/p bilateral ureteral stent placement (1/14/25), and history of atrial fibrillation on Eliquis, T2DM, legally blind, and HTN who was admitted with Altered mental status [R41.82]  Vaginal bleeding [N93.9]  Weakness [R53.1]  Altered mental status, unspecified altered mental status type [R41.82]  Nontraumatic intracerebral hemorrhage, unspecified cerebral location, unspecified laterality  (CMD) [I61.9]  Intracerebral hemorrhage, nontraumatic  (CMD) [I61.9]. In the ED, vitals were stable. Labs revealed Na+ 131, WBC: 16.1. CT head concerning for left occipital lobe lesion with small volume blood products versus necrosis, chronic infarct vs neoplasm/metastasis.    Urology is asked to accept transfer of care for patient with hematuria. UA obtained showed red, turbid urine and therefore unable to interpret multiple values. >100 erythrocytes,  leukocytes, and large bacteria. Urine and blood culture are pending as well as ID consult given history of recent bacteremia. CTA chest and CT Abd/pelvis showed bilateral ureteral stents in position and extensive emphysematous cystitis with air into the ureters and kidneys. RN reports patient has been incontinent. PVR late morning was 0.     Patient saw Dr. Cotton in clinic for local cystoscopy/stent removal on 3/4/25 which she did not tolerate so plan was made to remove stents on March 17 with MAC IV sedation with plan for pre-op urine culture 1 week prior.    Kelsie is seen resting in bed. She is somnolent and provides very limited history. She is  uncertain if she had hematuria prior to admission.    Patient Active Problem List    Diagnosis Date Noted    Chest discomfort 2023     Priority: Medium     Myocardial perfusion scan: 2023 - LVEF 55%, normal scans.       Eliquis anticoagulation 10/26/2023     Priority: Medium     Started 10/26/2023.  On 5 mg BID.  History of AF.       Atrial fibrillation, recurrent, paroxysmal 10/20/2023     Priority: Medium     On Toprol XL, eliquis.   Noted at [facility] ER: 2023.   Myocardial perfusion scan: 2023 - LVEF 55%, normal scans.   ECG ([facility]): 2023 - AF at 112 bpm.     CHADS VASc Score    Congestive heart failure: 0 (YES: 1, NO: 0)  Hypertension: 1 (YES: 1, NO: 0)  Age: 0 (<65: 0, 65-74: 1, >75: 2)  Diabetes mellitus: 1 (YES: 1, NO: 0)  Stroke/TIA (transient ischemic attack)/thromboembolism: 0 (YES: 2, NO: 0)  Vascular disease: 0 (YES: 1, NO: 0)  Sex: 1 (Male: 0, Female: 1)    Total Score: 3    CHADSVASC clinical risk estimation. Adapted from Reinaldo et al.   [id number]   SCORE ADJUSTED STROKE RATE (% year)   0 0%   1 1,3%   2 2,2%   3 3,2%   4 4,0%   5 6,7%   6 9,8%   7 9,6%   8 6,7%   9 15,2%           LBBB (left bundle branch block), rate dependent 2023     Priority: Medium    Syncope, recurrent 2023     Priority: Medium     On Toprol XL (2023).   Admitted to [facility] 3/20/2023 following 2 syncopal episodes on 3 days.  ECHO ([facility]): 3/21/2023 - LVEF 60%, MARYJANE 48.6 ml.   Stress ECHO: 2021 - LVEF 62%, rate dependent LBBB @114 bpm, no ischemia.   Myocardial perfusion scan([facility]): 2020: probably normal scans.   EC2022 - SB at 56 bpm.  No Holter in Epic.       Altered mental status 2025     Priority: Low    Intracerebral hemorrhage, nontraumatic  (CMD) 2025     Priority: Low    Malignant neoplasm of corpus uteri, except isthmus  (CMD) 2025     Priority: Low    Bacterial infection due to Morganella morganii 2025     Priority: Low     Candidiasis 03/03/2025     Priority: Low    Post-op pain 01/13/2025     Priority: Low    History of cold sores 09/11/2024     Priority: Low    Primary insomnia 09/11/2024     Priority: Low    S/P bariatric surgery 09/12/2023     Priority: Low     10/2021.       Generalized weakness 10/22/2021     Priority: Low    Obesity (BMI 30-39.9) 10/04/2021     Priority: Low    Type 2 diabetes mellitus with diabetic polyneuropathy, without long-term current use of insulin (CMD) 10/04/2021     Priority: Low    Stage 3a chronic kidney disease  (CMD) 08/12/2021     Priority: Low    Cervical strain 05/20/2019     Priority: Low    Cervical spondylosis without myelopathy 05/20/2019     Priority: Low    Arthritis of left knee 10/19/2018     Priority: Low    Family history of breast cancer in mother 01/05/2018     Priority: Low    Mixed hyperlipidemia 06/16/2017     Priority: Low    Lichen sclerosus et atrophicus 02/14/2017     Priority: Low    Dysplastic nevus 02/14/2017     Priority: Low    Major depressive disorder, recurrent episode, moderate (CMS/HCC) 03/04/2016     Priority: Low    Essential hypertension 02/21/2016     Priority: Low    Benign essential tremor 07/29/2015     Priority: Low    Family history of premature CAD 04/14/2015     Priority: Low    GERD (gastroesophageal reflux disease) 01/23/2015     Priority: Low    Microalbuminuria 10/21/2014     Priority: Low    Legal blindness OS>OD 08/11/2014     Priority: Low     OS>OD      Pars planitis of both eyes 08/11/2014     Priority: Low     s/p Cryo? OU, quiet        Nuclear senile cataract of left eye 08/11/2014     Priority: Low    PCO (posterior capsular opacification), right 08/11/2014     Priority: Low    Hyperlipidemia 07/01/2014     Priority: Low    RYLIE (generalized anxiety disorder) 06/05/2014     Priority: Low    Diabetic neuropathy  (CMD) 06/05/2014     Priority: Low     Past Medical History:   Diagnosis Date    A-fib  (CMD)     Acid reflux     Alcohol  Last Admin    sodium chloride flush 0.9 % injection 5-40 mL  5-40 mL IntraVENous 2 times per day Joy-Latisha Hand APRN - CNP   10 mL at 03/24/25 0926    sodium chloride flush 0.9 % injection 5-40 mL  5-40 mL IntraVENous PRN Joy-Latisha Hand APRN - CNP        0.9 % sodium chloride infusion   IntraVENous PRN Claudian-Latisha Hand APRN - CNP        potassium chloride (KLOR-CON M) extended release tablet 40 mEq  40 mEq Oral PRN Antoniettazan-Latisha Hand APRN - CRISTY        Or    potassium bicarb-citric acid (EFFER-K) effervescent tablet 40 mEq  40 mEq Oral PRN Antoniettazan-Latisha Hand APRN - CRISTY        Or    potassium chloride 10 mEq/100 mL IVPB (Peripheral Line)  10 mEq IntraVENous PRN Joy-Latisha Hand APRN - CNP        magnesium sulfate 2000 mg in 50 mL IVPB premix  2,000 mg IntraVENous PRN Latisha Low APRN - CNP        enoxaparin (LOVENOX) injection 60 mg  60 mg SubCUTAneous BID Latisha Low APRN - CNP   60 mg at 03/24/25 0925    ondansetron (ZOFRAN-ODT) disintegrating tablet 4 mg  4 mg Oral Q8H PRN Latisha Low APRN - CNP        Or    ondansetron (ZOFRAN) injection 4 mg  4 mg IntraVENous Q6H PRN Joy-Latisha Hand APRN - CRISTY        polyethylene glycol (GLYCOLAX) packet 17 g  17 g Oral Daily PRN Latisha Low APRN - CRISTY        acetaminophen (TYLENOL) tablet 650 mg  650 mg Oral Q6H PRN Joy-Latisha Hand APRN - CNP        Or    acetaminophen (TYLENOL) suppository 650 mg  650 mg Rectal Q6H PRN Latisha Low APRN - CNP        insulin lispro (HUMALOG,ADMELOG) injection vial 0-8 Units  0-8 Units SubCUTAneous 4x Daily AC & HS Joy-Latisha Hand APRN - CNP        glucose chewable tablet 16 g  4 tablet Oral PRN Latisha Low APRN - CNP        dextrose bolus 10% 125 mL  125 mL IntraVENous PRN Latisha Low APRN - CNP        Or    dextrose bolus 10% 250 mL  250 mL IntraVENous PRN Latisha Low,  intoxication (CMD)     Allergy     Anxiety disorder 06/05/2014    Arthritis     Cervical spondylosis without myelopathy     Cervical strain     Chronic kidney disease     stage 3    Cognitive impairment     Controlled type 2 diabetes mellitus with diabetic polyneuropathy, without long-term current use of insulin (CMD) 10/04/2021    Depressive disorder     Diabetes mellitus, type II  (CMD) 06/05/2014    Diabetic neuropathy  (CMD) 06/05/2014    Dizziness     Dysplastic nevus     Essential tremor 07/29/2015    Exertional dyspnea     Eye disorder     bilateral vision loss, legally blind, does not drive    Fall at home 01/01/2025    Face Abrasion    Family history of premature CAD 04/14/2015    Fibromyalgia     Former smoker     Gastroesophageal reflux disease     Glaucoma     inflammatory and angle closure    Herpes     Hyperlipidemia 07/01/2014    Irregular heart rhythm     Legally blind in left eye, as defined in [place]     and poor vision in right eye - only can read with a magnifying glass    Lichen sclerosus et atrophicus     Long term (current) use of anticoagulants     ELIQUIS    Memory loss     Microalbuminuria 10/21/2014    Nuclear senile cataract of left eye     Obesity, unspecified 06/06/2014    Ovarian mass     PAF (paroxysmal atrial fibrillation)  (CMD)     Pars planitis of both eyes     PCO (posterior capsular opacification), right     Personal history of other colon polyps     Stage 3a chronic kidney disease (CKD)  (CMD)     STD (female)     Suicidal ideation     Tremor     Type 2 diabetes mellitus with diabetic polyneuropathy, with long-term current use of insulin (CMD) 06/05/2014     Past Surgical History:   Procedure Laterality Date    Colon surgery  01/13/2025    LAR with proctective ileostomy per Dr. Pagan    Colonoscopy  09/22/2017    with 5 yr recall     Colonoscopy  09/26/2024    Cystoscopy w/ ureteral stent placement Bilateral 01/14/2025    Extracapsular cataract removal w insert io lens prosth wo  ecp  1993    IOL (James [id number] 24.0D) OD, [facility], Dr Garrett (and LPI 2 wks earlier)    Foot surgery  2009    glass removed    Gallbladder surgery  2012    Joint replacement      Laparoscopy gastrc restrictive longitudinal  10/12/2021    Brendan; Robotic sleeve with hiatal hernia repair    Total abdominal hysterectomy w/ bilateral salpingoophorectomy  01/13/2025    laparoscopy, SCOTT BSO debulking per Rivas    Total knee replacement Left 10/21/2019       Medications/Infusions:  Scheduled:   Current Facility-Administered Medications   Medication Dose Route Frequency Provider Last Rate Last Admin    sodium chloride 0.9 % injection 2 mL  2 mL Intracatheter 2 times per day Renetta Hollins DO   2 mL at 03/11/25 0913    Potassium Standard Replacement Protocol (Levels 3.5 and lower)   Does not apply See Admin Instructions Renetta Hollins DO        Magnesium Standard Replacement Protocol   Does not apply See Admin Instructions Renetta Hollins DO        Phosphorus Standard Replacement Protocol   Does not apply See Admin Instructions Renetta Hollins DO        insulin lispro (ADMELOG,HumaLOG) - Correction Dose   Subcutaneous 4 times per day Renetta Hollins DO        sodium chloride 0.9 % injection 2 mL  2 mL Intracatheter 2 times per day Renetta Hollins DO   2 mL at 03/11/25 0912       Continuous Infusions:   Current Facility-Administered Medications   Medication Dose Route Frequency Provider Last Rate Last Admin       ALLERGIES:   Allergen Reactions    Cefuroxime DIZZINESS, NAUSEA and Other (See Comments)     Dehydration    1/11/2025 received cefazolin in 10/2021 Winston Tejeda RPH     Amoxicillin RASH     1/11/2025 received cefazolin in 10/2021 Winston Tejeda RPH     Bactrim Ds HIVES    Celexa RASH    Ciprofloxacin RASH     Severe yeast infection    Lisinopril Cough    Melatonin Other (See Comments)     Night terrors    Oxycodone Other (See Comments)     Terrible nightmares, has used Hydrocodone in past with no issues  per pt    Oxycodone Hcl Other (See Comments)     Terrible nightmares    Penicillins RASH and Other (See Comments)     Hives  *tolerated Cefazolin during total knee procedure on 10/21/19    Sulfa Antibiotics RASH    Tramadol PRURITUS     Social History     Tobacco Use    Smoking status: Former     Current packs/day: 0.00     Average packs/day: 0.3 packs/day for 27.0 years (6.8 ttl pk-yrs)     Types: Cigarettes     Start date:      Quit date:      Years since quittin.1    Smokeless tobacco: Never    Tobacco comments:     quit in - restarted 2019-quit again in 2019 started again    Substance Use Topics    Alcohol use: Yes     Alcohol/week: 2.0 standard drinks of alcohol     Types: 2 Standard drinks or equivalent per week     Comment: occ     Family History   Problem Relation Age of Onset    Depression Mother     Cancer, Breast Mother     Myocardial Infarction Father         heart attack    Genitourinary Father         kidney stones    Diabetes Brother     Alcohol Abuse Brother     Cancer, Lung Brother     Cancer Brother         Bone marrow Ca    Patient is unaware of any medical problems Maternal Grandmother     Patient is unaware of any medical problems Maternal Grandfather     Diabetes Paternal Grandmother     Myocardial Infarction Paternal Grandmother     Patient is unaware of any medical problems Paternal Grandfather     Bipolar disorder Maternal Aunt     Cancer, Thyroid Maternal Aunt     Cancer, Thyroid Maternal Cousin     Cancer, Thyroid Maternal Cousin     Cancer, Thyroid Maternal Cousin        Review of Systems  A complete review of systems was performed with pertinent positives per HPI    Objective:    Vitals:    25 1600   BP:    Pulse:    Resp:    Temp: 98.3 °F (36.8 °C)       Intake/Output Summary (Last 24 hours) at 3/11/2025 1717  Last data filed at 3/11/2025 1600  Gross per 24 hour   Intake --   Output 50 ml   Net -50 ml     I/O this shift:  In: -   Out: 50 [Urine:50]    No  intake/output data recorded.      PHYSICAL EXAMINATION  General: Ill appearing female resting in bed, in no acute distress, appears stated age.   Neurologic: Somnolent. Called out in pain, reported feeling urge to void with gentle manipulation of lucas.  Respiratory: Respiratory effort normal.   Cardiovascular: Regular rate.  Genitourinary: Lucas catheter in place draining grossly bloody urine.    Procedure:  The lucas catheter was irrigated with 600 cc of sterile water with return of zero clots. The urine was irrigated until light pink and lucas was reconnected to bag. Patient with a couple instances of pain with irrigation due to suspected bladder spasms but overall tolerated irrigation well.    Data Review:  Laboratory Results:  Recent Labs     03/11/25  0214 03/11/25  0217 03/11/25  1201   SODIUM 131*  --  131*   POTASSIUM 4.6  --   --    CHLORIDE 99  --   --    CO2 23  --   --    GLUCOSE 170*  --   --    BUN 38*  --   --    CREATININE 0.79 0.90  --    ALBUMIN 2.5*  --   --    BILIRUBIN 0.5  --   --    AST 8  --   --      Recent Labs     03/11/25  0214 03/11/25  0701   WBC 16.1* 12.6*   RBC 4.06 3.94*   HCT 35.5* 36.4   HGB 11.5* 11.3*    183     Lab Results   Component Value Date    PTT 24 08/14/2022    MMB 0.7 07/26/2015    RAPDTR <0.02 10/25/2019    .0 (H) 02/12/2025    TSH 3.141 05/09/2024    ORQUIDEA 110 01/14/2025       Urinalysis  Lab Results   Component Value Date    WBC 12.6 (H) 03/11/2025    RBC 3.94 (L) 03/11/2025    URBC Large (A) 03/11/2025    UBILI Unable to interpret due to interfering substances (A) 03/11/2025    UPH  03/11/2025      Comment:      Unable to interpret due to interfering substance    UBACTR FEW (A) 10/25/2019       No results found for: \"PSA\"    Imaging and other studies:    3/11/25 CTA Chest PE and Abdomen Pelvis W Contrast    IMPRESSION:       1.  No evidence for pulmonary embolus.     2.  Extensive emphysematous cystitis with air tracking superiorly into the ureters and  kidneys. Ureteral stents are in satisfactory position.     3.  Extensive pericystic edema with noncontained fluid and fat stranding in the presacral region. No discernible drainable walled off fluid collection.  Of note edematous changes in this region makes it difficult to assess peritoneal nodularity noted on prior exam.     4.  Small left-sided pleural effusion.     5.  Additional stable ancillary findings as above.    ASSESSMENT:  Gross hematuria 2/2 bilateral ureteral stents and/or UTI  Bilateral renal stents placed for postoperative ureteral obstruction following extensive surgery with Dr. Hathaway. Complex post operative course  Emphysematous cystitis  Left occipital hemorrhagic mass  Encephalopathy  Recent Morganella bacteremia  Paroxsymal atrial fibrillation on Eliquis  HTN  T2DM  Legally blind    PLAN:  Place Lucas catheter for complete urinary system decompression given emphysematous cystitis. Maintain until  hematuria improves and patient improves clinically with timing of removal TBD  Some hematuria is expected given presence of bilateral ureteral stents. Encourage patient to push po fluids when able  Hand irrigate Lucas q2h and PRN for worsening hematuria, clots, or non draining catheter. If hematuria is refractory to hand irrigation, will consider 3 way lucas placement with initiation of CBI  Levsin PRN for bladder spasms  ID consulted for abx recommendations. Follow up urine and blood cultures  GYN Onc consulted  Will consider cystoscopy/bilateral ureteral stent removal with MAC IV sedation this admission if urine culture is negative, or when she in on culture specific abx, cleared by ID and pending clinical course      Discussed with: Patient, RN, Dr. Coffey, Dr. Cotton    Thank you for allowing us to participate in the care of this patient. We will continue to follow.    Kelsie is ready for discharge from my viewpoint: No  Workup needed prior to discharge:  F/U urine and blood cxs, abx per ID,  scaled imaging, Doppler spectral analysis and color flow Doppler was obtained of the lower left extremity. COMPARISON: None. HISTORY: ORDERING SYSTEM PROVIDED HISTORY: DIMINISHED PULSES, LEG TECHNOLOGIST PROVIDED HISTORY: What reading provider will be dictating this exam?->CRC FINDINGS: There are multiphasic waveforms above the knee and monophasic flow below the knee in the anterior tibial artery.  Evaluation of the calf is limited due to body habitus and edema. Velocity elevation in the popliteal artery suggesting approximately 50% stenosis. Flow velocities were measured as follows: Com. Fem.  179 cm/sec SFA Prox.    173 cm/sec SFA Mid.     143 cm/sec SFA Dist.     96 cm/sec Pop.           251 cm/sec ADITYA            106 cm/sec     1.  Triphasic waveforms above the knee.  Change in waveform and velocity elevation at the level of the popliteal artery suggesting stenosis likely in the range of 50% or greater.  No occlusions. 2.  Monophasic flow in the anterior tibial artery.  The posterior tibial and peroneal vessels were difficult to visualize. 3.  Findings are compatible with moderate PAD.       2D Echo:     No results found for this or any previous visit.      Stress Test:     No results found for this or any previous visit.  No results found for this or any previous visit from the past 365 days.       No results found for this or any previous visit from the past 365 days.      ASSESSMENT:    Active Hospital Problems    Diagnosis Date Noted    CHF (congestive heart failure), NYHA class IV, acute, combined ([facility]) [I50.41] 03/24/2025     Heart failure with reduced ejection fraction, EF < 20% per echo in 2021  Dilated cardiomyopathy   Mildly elevated troponin (21,21)- likely demand ischemia however underlying CAD cannot reliably be excluded   Hypertension  Hyperlipidemia  Type 2 diabetes mellitus  Family history of heart disease   Moderate PAD   Recent COVID/pneumonia infection     PLAN:   TTE ordered -- pending further  possible cystoscopy/stent removal this admission  Medications changes at discharge:   TBD  Follow up appointments needed after discharge:   TBD    Estimated Date of Discharge documented: 3/14/2025     [facility]  Office Phone: [phone]  Pager: [phone]    Available Monday-Wednesday by pager [phone]. On Thursday and Friday please page [phone] in my absence.     Please page the answering service with questions between [phone] and on weekends.    ** If you have paged me and have not received a return call in 15 minutes- please page [phone]**    Consulting Urologist:  Gal Coffey [phone]

## 2025-03-24 NOTE — RT PROTOCOL NOTE
RT Inhaler-Nebulizer Bronchodilator Protocol Note    There is a bronchodilator order in the chart from a provider indicating to follow the RT Bronchodilator Protocol and there is an “Initiate RT Inhaler-Nebulizer Bronchodilator Protocol” order as well (see protocol at bottom of note).    CXR Findings:  XR CHEST PORTABLE  Result Date: 3/23/2025  Suspicious for CHF but limited exam noted.       The findings from the last RT Protocol Assessment were as follows:   History Pulmonary Disease: None or smoker <15 pack years  Respiratory Pattern: Regular pattern and RR 12-20 bpm  Breath Sounds: Slightly diminished and/or crackles  Cough: Strong, spontaneous, non-productive  Indication for Bronchodilator Therapy: None  Bronchodilator Assessment Score: 2    Aerosolized bronchodilator medication orders have been revised according to the RT Inhaler-Nebulizer Bronchodilator Protocol below.    Respiratory Therapist to perform RT Therapy Protocol Assessment initially then follow the protocol.  Repeat RT Therapy Protocol Assessment PRN for score 0-3 or on second treatment, BID, and PRN for scores above 3.    No Indications - adjust the frequency to every 6 hours PRN wheezing or bronchospasm, if no treatments needed after 48 hours then discontinue using Per Protocol order mode.     If indication present, adjust the RT bronchodilator orders based on the Bronchodilator Assessment Score as indicated below.  Use Inhaler orders unless patient has one or more of the following: on home nebulizer, not able to hold breath for 10 seconds, is not alert and oriented, cannot activate and use MDI correctly, or respiratory rate 25 breaths per minute or more, then use the equivalent nebulizer order(s) with same Frequency and PRN reasons based on the score.  If a patient is on this medication at home then do not decrease Frequency below that used at home.    0-3 - enter or revise RT bronchodilator order(s) to equivalent RT Bronchodilator order with

## 2025-03-24 NOTE — PROGRESS NOTES
03/24/25 0400   RT Protocol   History Pulmonary Disease 0   Respiratory pattern 0   Breath sounds 2   Cough 0   Indications for Bronchodilator Therapy None   Bronchodilator Assessment Score 2

## 2025-03-24 NOTE — CARE COORDINATION
Met with the patient. Patient denies any issues obtaining their medications or supplies. Patient also denies any issues getting to follow up appointments. Patient states he wants a \"mercy PCP\" instead of a  PCP. Updated case management. Patient states he also has been struggling to get his Ozempic filled from his diabetes doctors office. He is waiting on a prior auth for his insurance. Patient states he not able to weigh himself at home. He is not able to get on a scale at home per patient. He states he only weighs himself when he comes to hospital the past four times he has been in here. Patient follow with Dr. lSoan for his CHF> He states he is working on following a low calorie, low sodium diet at home.   Definition of congestive heart failure explained.   Common causes such as arrhythmias, valve disease, alcohol and drug use, coronary artery disease, hypertension, previous MI, and sleep apnea discussed.   Signs and symptoms of CHF, such as fatigue, cough, shortness of breath, edema, weight gain reviewed with patient.   We talked about the importance of medication compliance.   Low sodium diet of 2,000 mg/day encouraged. Nutrition label reviewed with patient with explanation that the ingredient amounts are listed per serving. Patient was shown how to calculate the number of milligrams of sodium on the nutrition label. Hints for decreasing sodium intake provided such as avoiding canned or processed foods. Fresh meats, vegetables, and fruits encouraged. We also discussed salt free seasonings and cautious use of salt substitutes. A general shopping guide was also provided with food choice recommendations that assist with maintaining a low sodium diet. A low-fat diet was also encouraged.  Hints for avoiding high sodium meals at a restaurant were provided. These included: ordering fresh salads and fruits, food without seasoning (bring their own salt free seasonings), avoiding fried foods, and asking for sauce on

## 2025-03-24 NOTE — CARE COORDINATION
Case Management Assessment  Initial Evaluation    Date/Time of Evaluation: 3/24/2025 11:01 AM  Assessment Completed by: Tess Kilpatrick RN    If patient is discharged prior to next notation, then this note serves as note for discharge by case management.    Patient Name: Attila Salinas                   YOB: 1990  Diagnosis: Acute systolic congestive heart failure (HCC) [I50.21]  Acute respiratory failure with hypoxia [J96.01]  CHF (congestive heart failure), NYHA class IV, acute, combined (HCC) [I50.41]                   Date / Time: 3/23/2025  8:40 PM    Patient Admission Status: Inpatient   Readmission Risk (Low < 19, Mod (19-27), High > 27): Readmission Risk Score: 15.1    Current PCP: Carissa Bello RD  PCP verified by CM? Yes    Chart Reviewed: Yes      History Provided by: Patient  Patient Orientation: Alert and Oriented, Person, Place, Situation, Self    Patient Cognition: Alert    Hospitalization in the last 30 days (Readmission):  No    If yes, Readmission Assessment in  Navigator will be completed.    Advance Directives:      Code Status: Full Code   Patient's Primary Decision Maker is: Legal Next of Kin      Discharge Planning:    Patient lives with: Spouse/Significant Other Type of Home: House  Primary Care Giver: Self  Patient Support Systems include: Spouse/Significant Other, Family Members   Current Financial resources:    Current community resources:    Current services prior to admission: None            Current DME:              Type of Home Care services:  None    ADLS  Prior functional level: Independent in ADLs/IADLs  Current functional level: Independent in ADLs/IADLs    PT AM-PAC:   /24  OT AM-PAC:   /24    Family can provide assistance at DC: Yes  Would you like Case Management to discuss the discharge plan with any other family members/significant others, and if so, who? No  Plans to Return to Present Housing: Yes  Other Identified Issues/Barriers to RETURNING to current  Management Department

## 2025-03-24 NOTE — ED TRIAGE NOTES
Pt in triage very anxious pt is unable to sit down. Pt states he SOB for 9 days. Pt denies pain. Pt resp even and labored. Pt able to follow commands.

## 2025-03-24 NOTE — H&P
DEPARTMENT OF HOSPITAL MEDICINE    HISTORY AND PHYSICAL EXAM    PATIENT NAME:  Attila Salinas    MRN:  [id number]  SERVICE DATE:  3/24/2025   SERVICE TIME:  1:33 AM    Primary Care Physician: Carissa Bello RD         SUBJECTIVE  CHIEF COMPLAINT:  Shortness of Breath       HPI: Patient being admitted for shortness of breath, likely 2/2 acute on chronic HF.  Patient is a pleasant, alert and oriented x 3, 35-year-old  male.  Patient states that for about 9 days he has been experiencing shortness of breath.  Patient denies any fever, cough, chest pain, abdominal pain, nausea, or vomiting.  Patient also reports that he has noticed swelling in his lower extremities bilaterally primarily in his thighs.  Patient denies any orthopnea but states he definitely has SCRUGGS.  Patient has a history of HF with reduced ejection fraction of 20 to 25% and most recently treated for COVID-pneumonia in January.  Patient also takes medications for HTN and DM2.  Patient reports compliance and denies any alcohol, tobacco, or illicit drug use including marijuana    PAST MEDICAL HISTORY:    Past Medical History:   Diagnosis Date    Cellulitis     CHF (congestive heart failure) (HCC)     Diabetes mellitus (HCC)     Hyperlipidemia     Hypertension      PAST SURGICAL HISTORY:  No past surgical history on file.  FAMILY HISTORY:  No family history on file.  SOCIAL HISTORY:    Social History     Socioeconomic History    Marital status: Single     Spouse name: Not on file    Number of children: Not on file    Years of education: Not on file    Highest education level: Not on file   Occupational History    Not on file   Tobacco Use    Smoking status: Never    Smokeless tobacco: Never   Vaping Use    Vaping status: Never Used   Substance and Sexual Activity    Alcohol use: No    Drug use: No    Sexual activity: Yes     Partners: Female   Other Topics Concern    Not on file   Social History Narrative    Not on file     Social Drivers of Health

## 2025-03-24 NOTE — CONSULTS
Inpatient consult to Pulmonology  Consult performed by: Marilu Gamez MD  Consult ordered by: Magnus Calderon MD            Admit Date: 3/23/2025    PCP:  None, None    CHIEF COMPLAINT: Shortness of breath    HPI:  The patient is a 35 y.o. male with significant past medical history of morbid obesity, CHF, diabetes and hypertension who presented with worsening shortness of breath.  This has been going on for few days.  Of note, he was recently treated for COVID-19 infection and was briefly hospitalized.  He is known to have ejection fraction of 20%.    Has been diagnosed with obstructive sleep apnea in 2021 at [facility] but has not been able to get a Pap machine.  He is currently on 2 L of oxygen.  He underwent a CT chest in ER yesterday.    CT chest reviewed personally and results interpreted  independently.  Has small effusions with some atelectasis.  Has some evidence of volume overload.    Past Medical History:      Diagnosis Date    Cellulitis     CHF (congestive heart failure) (HCC)     Diabetes mellitus (HCC)     Hyperlipidemia     Hypertension         Past Surgical History:    No past surgical history on file.    Current Medications:     sodium chloride flush  5-40 mL IntraVENous 2 times per day    enoxaparin  60 mg SubCUTAneous BID    insulin lispro  0-8 Units SubCUTAneous 4x Daily AC & HS    atorvastatin  10 mg Oral Nightly    losartan  25 mg Oral Daily    metoprolol tartrate  12.5 mg Oral BID    aspirin  81 mg Oral Daily    furosemide  80 mg IntraVENous BID     Home Meds:  Prior to Admission medications    Medication Sig Start Date End Date Taking? Authorizing Provider   metFORMIN (GLUCOPHAGE) 1000 MG tablet bid 2/10/25  Yes Ra Cruz MD   metoprolol tartrate (LOPRESSOR) 25 MG tablet Take 0.5 tablets by mouth 2 times daily   Yes ProviderMiri MD   losartan (COZAAR) 25 MG tablet Take 1 tablet by mouth daily 12/26/24  Yes ProviderMiri MD   albuterol sulfate HFA (VENTOLIN HFA) 108

## 2025-03-24 NOTE — PROGRESS NOTES
Hospitalist Progress Note      PCP: Carissa Bello RD    Date of Admission: 3/23/2025    Chief Complaint:    Chief Complaint   Patient presents with    Shortness of Breath     Subjective:  Patient denies fevers, chills, sweats, CP, SOB, diarrhea or burning micturition.  12 point ROS negative other than mentioned above     Medications:  Reviewed    Infusion Medications    sodium chloride      dextrose       Scheduled Medications    sodium chloride flush  5-40 mL IntraVENous 2 times per day    enoxaparin  60 mg SubCUTAneous BID    insulin lispro  0-8 Units SubCUTAneous 4x Daily AC & HS    atorvastatin  10 mg Oral Nightly    losartan  25 mg Oral Daily    metoprolol tartrate  12.5 mg Oral BID    aspirin  81 mg Oral Daily    furosemide  80 mg IntraVENous BID     PRN Meds: sodium chloride flush, sodium chloride, potassium chloride **OR** potassium alternative oral replacement **OR** potassium chloride, magnesium sulfate, ondansetron **OR** ondansetron, polyethylene glycol, acetaminophen **OR** acetaminophen, glucose, dextrose bolus **OR** dextrose bolus, glucagon (rDNA), dextrose, labetalol, albuterol sulfate HFA      Intake/Output Summary (Last 24 hours) at 3/24/2025 1308  Last data filed at 3/24/2025 1243  Gross per 24 hour   Intake 773.73 ml   Output 900 ml   Net -126.27 ml     Exam:    BP (!) 147/91   Pulse 94   Temp 97.9 °F (36.6 °C) (Oral)   Resp 20   Ht 2.032 m (6' 8\")   Wt (!) 280.6 kg (618 lb 9.8 oz)   SpO2 93%   BMI 67.96 kg/m²     General appearance: Obesis.  HEENT:  Conjunctivae/corneas clear.  Neck: Trachea midline.  Respiratory:  diminished  Cardiovascular: Regular rate and rhythm  Abdomen: Soft, non-tender, non-distended with normal bowel sounds.  Musculoskeletal: No clubbing, cyanosis or edema bilaterally  Neuro: Non Focal.     Labs:   Recent Labs     03/23/25 2124 03/24/25  0532   WBC 10.1 9.6   HGB 12.1* 11.7*   HCT 36.6* 36.3*    327     Recent Labs     03/23/25 2120 03/23/25 2124  03/24/25  0532   NA  --  138 139   K  --  3.5 3.5   CL  --  102 103   CO2  --  20 23   BUN  --  16 13   CREATININE 0.7* 0.62* 0.65*   CALCIUM  --  8.6 8.7     Recent Labs     03/23/25 2124 03/24/25  0532   AST 25 20   ALT 25 22   BILITOT 0.5 0.7   ALKPHOS 123* 117*     Recent Labs     03/23/25 2124   INR 1.2     No results for input(s): \"CKTOTAL\", \"TROPONINI\" in the last 72 hours.    Urinalysis:      Lab Results   Component Value Date/Time    NITRU Negative 07/30/2021 05:45 PM    BLOODU Negative 07/30/2021 05:45 PM    GLUCOSEU Negative 07/30/2021 05:45 PM       Radiology:  CTA CHEST W WO CONTRAST   Final Result   1. No acute pulmonary artery embolism.   2. Small bilateral pleural effusions with associated atelectasis.         XR CHEST PORTABLE   Final Result   Suspicious for CHF but limited exam noted.           Assessment/Plan:    #Acute on chronic diastolic CHF secondary dilated CM to obesity hypoventilation; untreated JIHAN and probable pulm HTN   - echo in process; if systolic failure may benefit from LHC   - increase diuresis; BNP 'low' due to his obesity   - bipap ordered at night; per patient he was recommended a bipap but needed a sleep lab study which he was unable to go to; pulm consulted for their assistance how we can arrange a bipap on discharge if indicated    #Severe obesity with obesity hypoventilation    - patient stated he was prescribed ozempic but due to insurance is having difficulty obtaining    #DMII   - SSI    #HTN/HLD/PAD    - med adjustments per cardiology    Active Hospital Problems    Diagnosis Date Noted    CHF (congestive heart failure), NYHA class IV, acute, combined (HCC) [I50.41] 03/24/2025        Additional work up or/and treatment plan may be added today or then after based on clinical progression. I am managing a portion of pt care. Some medical issues are handled by other specialists. Additional work up and treatment should be done in out pt setting by pt PCP and other out pt  c/o right shoulder blade painful rash.

## 2025-03-25 LAB
ALBUMIN SERPL-MCNC: 3.3 G/DL (ref 3.5–4.6)
ALP SERPL-CCNC: 113 U/L (ref 35–104)
ALT SERPL-CCNC: 21 U/L (ref 0–41)
ANION GAP SERPL CALCULATED.3IONS-SCNC: 10 MEQ/L (ref 9–15)
AST SERPL-CCNC: 19 U/L (ref 0–40)
BASOPHILS # BLD: 0 K/UL (ref 0–0.2)
BASOPHILS NFR BLD: 0.3 %
BILIRUB SERPL-MCNC: 0.8 MG/DL (ref 0.2–0.7)
BUN SERPL-MCNC: 8 MG/DL (ref 6–20)
CALCIUM SERPL-MCNC: 8.6 MG/DL (ref 8.5–9.9)
CHLORIDE SERPL-SCNC: 101 MEQ/L (ref 95–107)
CO2 SERPL-SCNC: 29 MEQ/L (ref 20–31)
CREAT SERPL-MCNC: 0.69 MG/DL (ref 0.7–1.2)
EOSINOPHIL # BLD: 0.4 K/UL (ref 0–0.7)
EOSINOPHIL NFR BLD: 5.3 %
ERYTHROCYTE [DISTWIDTH] IN BLOOD BY AUTOMATED COUNT: 16.8 % (ref 11.5–14.5)
GLOBULIN SER CALC-MCNC: 3.2 G/DL (ref 2.3–3.5)
GLUCOSE BLD-MCNC: 140 MG/DL (ref 70–99)
GLUCOSE BLD-MCNC: 147 MG/DL (ref 70–99)
GLUCOSE BLD-MCNC: 153 MG/DL (ref 70–99)
GLUCOSE BLD-MCNC: 166 MG/DL (ref 70–99)
GLUCOSE SERPL-MCNC: 145 MG/DL (ref 70–99)
HCT VFR BLD AUTO: 37.7 % (ref 42–52)
HGB BLD-MCNC: 11.8 G/DL (ref 14–18)
LYMPHOCYTES # BLD: 1.1 K/UL (ref 1–4.8)
LYMPHOCYTES NFR BLD: 14.6 %
MAGNESIUM SERPL-MCNC: 1.8 MG/DL (ref 1.7–2.4)
MCH RBC QN AUTO: 26.4 PG (ref 27–31.3)
MCHC RBC AUTO-ENTMCNC: 31.3 % (ref 33–37)
MCV RBC AUTO: 84.3 FL (ref 79–92.2)
MONOCYTES # BLD: 0.5 K/UL (ref 0.2–0.8)
MONOCYTES NFR BLD: 6.5 %
NEUTROPHILS # BLD: 5.5 K/UL (ref 1.4–6.5)
NEUTS SEG NFR BLD: 72.9 %
PERFORMED ON: ABNORMAL
PLATELET # BLD AUTO: 304 K/UL (ref 130–400)
POTASSIUM SERPL-SCNC: 3.5 MEQ/L (ref 3.4–4.9)
PROT SERPL-MCNC: 6.5 G/DL (ref 6.3–8)
RBC # BLD AUTO: 4.47 M/UL (ref 4.7–6.1)
SODIUM SERPL-SCNC: 140 MEQ/L (ref 135–144)
WBC # BLD AUTO: 7.5 K/UL (ref 4.8–10.8)

## 2025-03-25 PROCEDURE — 99232 SBSQ HOSP IP/OBS MODERATE 35: CPT | Performed by: INTERNAL MEDICINE

## 2025-03-25 PROCEDURE — 85025 COMPLETE CBC W/AUTO DIFF WBC: CPT

## 2025-03-25 PROCEDURE — 6370000000 HC RX 637 (ALT 250 FOR IP): Performed by: NURSE PRACTITIONER

## 2025-03-25 PROCEDURE — APPSS45 APP SPLIT SHARED TIME 31-45 MINUTES

## 2025-03-25 PROCEDURE — 2700000000 HC OXYGEN THERAPY PER DAY

## 2025-03-25 PROCEDURE — 94660 CPAP INITIATION&MGMT: CPT

## 2025-03-25 PROCEDURE — 2500000003 HC RX 250 WO HCPCS: Performed by: NURSE PRACTITIONER

## 2025-03-25 PROCEDURE — 83735 ASSAY OF MAGNESIUM: CPT

## 2025-03-25 PROCEDURE — 36415 COLL VENOUS BLD VENIPUNCTURE: CPT

## 2025-03-25 PROCEDURE — 6360000002 HC RX W HCPCS: Performed by: INTERNAL MEDICINE

## 2025-03-25 PROCEDURE — 80053 COMPREHEN METABOLIC PANEL: CPT

## 2025-03-25 PROCEDURE — 6360000002 HC RX W HCPCS: Performed by: NURSE PRACTITIONER

## 2025-03-25 PROCEDURE — 6370000000 HC RX 637 (ALT 250 FOR IP): Performed by: INTERNAL MEDICINE

## 2025-03-25 PROCEDURE — 2060000000 HC ICU INTERMEDIATE R&B

## 2025-03-25 RX ADMIN — CARVEDILOL 3.12 MG: 3.12 TABLET, FILM COATED ORAL at 08:31

## 2025-03-25 RX ADMIN — SACUBITRIL AND VALSARTAN 1 TABLET: 24; 26 TABLET, FILM COATED ORAL at 20:27

## 2025-03-25 RX ADMIN — ENOXAPARIN SODIUM 60 MG: 100 INJECTION SUBCUTANEOUS at 08:34

## 2025-03-25 RX ADMIN — FUROSEMIDE 80 MG: 10 INJECTION, SOLUTION INTRAMUSCULAR; INTRAVENOUS at 08:34

## 2025-03-25 RX ADMIN — Medication 10 ML: at 20:27

## 2025-03-25 RX ADMIN — ATORVASTATIN CALCIUM 10 MG: 10 TABLET, FILM COATED ORAL at 20:27

## 2025-03-25 RX ADMIN — POTASSIUM CHLORIDE 40 MEQ: 1500 TABLET, EXTENDED RELEASE ORAL at 08:43

## 2025-03-25 RX ADMIN — ASPIRIN 81 MG: 81 TABLET, CHEWABLE ORAL at 08:31

## 2025-03-25 RX ADMIN — CARVEDILOL 3.12 MG: 3.12 TABLET, FILM COATED ORAL at 17:42

## 2025-03-25 RX ADMIN — FUROSEMIDE 80 MG: 10 INJECTION, SOLUTION INTRAMUSCULAR; INTRAVENOUS at 17:42

## 2025-03-25 RX ADMIN — SACUBITRIL AND VALSARTAN 1 TABLET: 24; 26 TABLET, FILM COATED ORAL at 08:32

## 2025-03-25 RX ADMIN — ENOXAPARIN SODIUM 60 MG: 100 INJECTION SUBCUTANEOUS at 20:26

## 2025-03-25 RX ADMIN — Medication 10 ML: at 08:34

## 2025-03-25 NOTE — PROGRESS NOTES
Hospitalist Progress Note      PCP: None, None    Date of Admission: 3/23/2025    Chief Complaint:    Chief Complaint   Patient presents with    Shortness of Breath     Subjective:  Patient denies fevers, chills, sweats, CP, SOB, diarrhea or burning micturition.  12 point ROS negative other than mentioned above     Medications:  Reviewed    Infusion Medications    sodium chloride      dextrose       Scheduled Medications    sodium chloride flush  5-40 mL IntraVENous 2 times per day    enoxaparin  60 mg SubCUTAneous BID    insulin lispro  0-8 Units SubCUTAneous 4x Daily AC & HS    atorvastatin  10 mg Oral Nightly    aspirin  81 mg Oral Daily    furosemide  80 mg IntraVENous BID    sacubitril-valsartan  1 tablet Oral BID    carvedilol  3.125 mg Oral BID      PRN Meds: sodium chloride flush, sodium chloride, potassium chloride **OR** potassium alternative oral replacement **OR** potassium chloride, magnesium sulfate, ondansetron **OR** ondansetron, polyethylene glycol, acetaminophen **OR** acetaminophen, glucose, dextrose bolus **OR** dextrose bolus, glucagon (rDNA), dextrose, labetalol, albuterol sulfate HFA      Intake/Output Summary (Last 24 hours) at 3/25/2025 1240  Last data filed at 3/25/2025 1137  Gross per 24 hour   Intake 1123.73 ml   Output 29481 ml   Net -9776.27 ml     Exam:    BP (!) 134/98   Pulse 94   Temp 97.6 °F (36.4 °C) (Oral)   Resp 20   Ht 2.032 m (6' 8\")   Wt (!) 280.6 kg (618 lb 9.8 oz)   SpO2 94%   BMI 67.96 kg/m²     General appearance: obese.  HEENT:  Conjunctivae/corneas clear.  Neck: Trachea midline.  Respiratory:  diminished  Cardiovascular: Regular rate and rhythm  Abdomen: Soft, non-tender, non-distended with normal bowel sounds.  Musculoskeletal: No clubbing, cyanosis or edema bilaterally  Neuro: Non Focal.     Labs:   Recent Labs     03/23/25  2124 03/24/25  0532 03/25/25  0526   WBC 10.1 9.6 7.5   HGB 12.1* 11.7* 11.8*   HCT 36.6* 36.3* 37.7*    327 304     Recent Labs

## 2025-03-25 NOTE — PLAN OF CARE
Assessment and Plan   61-year-old male with past no history of coronary artery disease, type 2 diabetes with peripheral neuropathy, hypertension, heart failure, obesity who presents for follow-up regarding diabetes.  I had started patient on Jardiance for uncontrolled type 2 diabetes in May 2023.  Had improved his A1c from 9 down to 7.4 but patient having significant polyuria with this.  I attempted to do a pharmacist appointment to consider other medications but not covered by patient's insurance.  Spoke with pharmacist through chart and recommended trialing GLP-1 instead of SGLT2.  Today we discussed this in more detail and patient would like to trial this.  From chart looks like Wegovy would be the most likely covered so we will trial him on this.  I recommended patient to not stop Jardiance until he is actually received the prescription for this.    1. Type 2 diabetes mellitus without complication, without long-term current use of insulin (H)  - Semaglutide-Weight Management (WEGOVY) 0.25 MG/0.5ML pen; Inject 0.25 mg Subcutaneous once a week  Dispense: 2 mL; Refill: 0  - Semaglutide-Weight Management (WEGOVY) 0.5 MG/0.5ML pen; Inject 0.5 mg Subcutaneous once a week  Dispense: 2 mL; Refill: 0  - Semaglutide-Weight Management (WEGOVY) 1 MG/0.5ML pen; Inject 1 mg Subcutaneous once a week  Dispense: 2 mL; Refill: 0    Follow up: 3 months for DM2    Options for treatment and follow-up care were reviewed with the patient and/or guardian. Mir Treviño Sr. and/or guardian engaged in the decision making process and verbalized understanding of the options discussed and agreed with the final plan.    Dr. Roberto Bates         HPI:   Mir Treviño Sr. is a 61 year old  male who presents for:    Chief Complaint   Patient presents with    Diabetes     talk about meds for Diabetes         Follow-up from last visit:  Last saw patient in May. At that time I was seeing him for diabetic visit and diabetes uncontrolled with    Problem: Chronic Conditions and Co-morbidities  Goal: Patient's chronic conditions and co-morbidity symptoms are monitored and maintained or improved  Outcome: Progressing     Problem: Discharge Planning  Goal: Discharge to home or other facility with appropriate resources  Outcome: Progressing     Problem: Safety - Adult  Goal: Free from fall injury  Outcome: Progressing     Problem: Pain  Goal: Verbalizes/displays adequate comfort level or baseline comfort level  Outcome: Progressing      an A1c of 8.4 approximately. Patient had stopped his metformin due to side effects. I started him on Jardiance given his history of CAD and heart failure. He tells me his blood sugars have been looking improved in the low 100s. However he has been noticing polyuria that started very consistently with starting Jardiance. He is up to 25 mg daily on the Jardiance. He has not noticed other symptoms with it such as dysuria, hematuria, penile pain or irritation. He is urinating approximately once an hour. Including at night. He denies symptoms of incomplete emptying such as needing to return to the bathroom soon after or weak stream or inability to start urinating.     Today:      DM overview  Diagnosed in 2020  Most recent HgbA1c:   Lab Results   Component Value Date    A1C 7.4 09/19/2023    A1C 8.4 05/22/2023    A1C 6.9 01/16/2023    A1C 6.4 07/18/2022    A1C 7.8 04/29/2022     Current medication regimen: jardiance titration up, was on metformin but significant GI side effects.     ASA: 81 mg daily  Statin: Rosuvastatin 20 mg daily    Complications:  - Retinopathy: Last ophthalmology appointment- scheduled to go now September sometime 22  - Nephropathy:  Last Comprehensive Metabolic Panel:  Lab Results   Component Value Date     01/19/2023    POTASSIUM 4.3 01/19/2023    CHLORIDE 106 01/19/2023    CO2 24 01/19/2023    ANIONGAP 9 01/19/2023     (H) 01/20/2023    BUN 16.6 01/19/2023    CR 0.72 01/19/2023    GFRESTIMATED >90 01/19/2023    JAMIE 8.9 01/19/2023       - Neuropathy: foot exam 7/23         PMHX:     Patient Active Problem List   Diagnosis    Hypertension    TB lung, latent    Allergies    Obesity    Heart failure with reduced ejection fraction (H)    Coronary artery disease of native artery of native heart with stable angina pectoris (H)    Mild intermittent asthma    Type 2 diabetes mellitus without complication, without long-term current use of insulin (H)    Sensorineural hearing loss (SNHL) of  "left ear    Tinnitus    Status post coronary artery bypass graft    Healthcare maintenance    Hepatic steatosis    Diabetic polyneuropathy associated with type 2 diabetes mellitus (H)       Social History     Tobacco Use    Smoking status: Never    Smokeless tobacco: Never   Substance Use Topics    Alcohol use: Yes     Comment: Alcoholic Drinks/day: \"Once a month, maybe.\"    Drug use: No       Social History     Social History Narrative    He is  an [employer] 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  Marisa Lincolnwaj         No Known Allergies    No results found for this or any previous visit (from the past 24 hour(s)).         Review of Systems:    ROS: 10 point ROS neg other than the symptoms noted above in the HPI.         Physical Exam:     Vitals:    09/29/23 1204   BP: 123/69   BP Location: Right arm   Patient Position: Sitting   Cuff Size: Adult Large   Pulse: 53   Resp: 14   Temp: 98.3  F (36.8  C)   TempSrc: Temporal   SpO2: 96%   Weight: 98.8 kg (217 lb 12.8 oz)   Height: 1.736 m (5' 8.35\")     Body mass index is 32.78 kg/m .    General appearance: Alert, cooperative, no distress, appears stated age  Head: Normocephalic, atraumatic, without obvious abnormality  Eyes: Pupils equal round, reactive.  Conjunctiva clear.  Nose: Nares normal, no drainage.  Throat: Lips, mucosa, tongue normal mucosa pink and moist  Neck: Supple, symmetric, trachea midline,            "

## 2025-03-25 NOTE — PROGRESS NOTES
contrast/bubble/strain/3D)  Result Date: 3/24/2025    Left Ventricle: Severely reduced left ventricular systolic function with a visually estimated EF of 25 - 30%. Left ventricle is severely dilated. Normal wall thickness. Severe global hypokinesis present. Abnormal diastolic function. NO LV APICAL THROMBUS   Right Ventricle: Normal systolic function.   Mitral Valve: Mild regurgitation.   Left Atrium: Left atrium is mildly dilated.   Pericardium: No pericardial effusion.   Image quality is technically difficult. Contrast used: Lumason.     CTA CHEST W WO CONTRAST  Result Date: 3/23/2025  EXAMINATION: CTA OF THE CHEST WITH AND WITHOUT CONTRAST 3/23/2025 10:47 pm TECHNIQUE: CTA of the chest was performed before and after the administration of intravenous contrast.  Multiplanar reformatted images are provided for review.  MIP images are provided for review. Automated exposure control, iterative reconstruction, and/or weight based adjustment of the mA/kV was utilized to reduce the radiation dose to as low as reasonably achievable. COMPARISON: None. HISTORY: ORDERING SYSTEM PROVIDED HISTORY: Shortness of breath, persistent tachycardia, cough recent viral illness TECHNOLOGIST PROVIDED HISTORY: Reason for exam:->Shortness of breath, persistent tachycardia, cough recent viral illness Decision Support Exception - unselect if not a suspected or confirmed emergency medical condition->Emergency Medical Condition (MA) What reading provider will be dictating this exam?->CRC FINDINGS: Evaluation limited due to patient body habitus. Pulmonary Arteries: Pulmonary arteries are adequately opacified for evaluation.  No evidence of intraluminal filling defect to suggest pulmonary embolism.  Main pulmonary artery is normal in caliber. Mediastinum: No evidence of mediastinal lymphadenopathy.  The heart and pericardium demonstrate no acute abnormality.  There is no acute abnormality of the thoracic aorta. Lungs/pleura: There are small  recommendations per Dr. Ozuna      Electronically signed by David Wolf PA-C on 3/25/2025 at 11:27 AM

## 2025-03-25 NOTE — CARE COORDINATION
PT CONTINUES ON IV LASIX.  DC PLAN HOME ONCE CLEARED BY DRS.  CURRENTLY ON O2 2L, MAY NEED HOME O2 EVAL IF UNABLE TO WEAN OFF.

## 2025-03-25 NOTE — PROGRESS NOTES
Pulmonary Progress Note    3/25/2025 8:24 AM    Subjective:   Admit Date: 3/23/2025  PCP: None, None    Chief Complaint   Patient presents with    Shortness of Breath     Interval History: Doing well overall.  No major issues.  Continues to be on IV Lasix.  Urine output is more than 5 L in 24 hours.  Continues to be on 2 L of oxygen.    14 points review of systems has been obtained and negative except to was mentioned in HPI.     Medications:   Scheduled Meds:   sodium chloride flush  5-40 mL IntraVENous 2 times per day    enoxaparin  60 mg SubCUTAneous BID    insulin lispro  0-8 Units SubCUTAneous 4x Daily AC & HS    atorvastatin  10 mg Oral Nightly    aspirin  81 mg Oral Daily    furosemide  80 mg IntraVENous BID    sacubitril-valsartan  1 tablet Oral BID    carvedilol  3.125 mg Oral BID WC     Continuous Infusions:   sodium chloride      dextrose           Objective:   Vitals:   Temp (24hrs), Av.5 °F (36.4 °C), Min:97.3 °F (36.3 °C), Max:97.9 °F (36.6 °C)    /85   Pulse 91   Temp 97.5 °F (36.4 °C) (Oral)   Resp 19   Ht 2.032 m (6' 8\")   Wt (!) 280.6 kg (618 lb 9.8 oz)   SpO2 94%   BMI 67.96 kg/m²   I/O:24HR INTAKE/OUTPUT:    Intake/Output Summary (Last 24 hours) at 3/25/2025 0824  Last data filed at 3/25/2025 0246  Gross per 24 hour   Intake 1133.73 ml   Output 7650 ml   Net -6516.27 ml      0701 -  0700  In: 1133.7 [P.O.:1080; I.V.:10]  Out: 7650 [Urine:7650]  CVP:        Physical Exam:   General appearance - alert, ill appearing, and in mild distress  Mental status - alert, oriented to person, place, and time  Eyes - pupils equal and reactive, extraocular eye movements intact. Normal sclera and conjunctiva   Nose - normal and patent, nasal cannula oxygen  Neck -thick neck, supple, no significant adenopathy. No thyromegaly.   Chest -diminished bilaterally to auscultation, no wheezes, rales or rhonchi, symmetric air entry  Heart - normal rate, regular rhythm, normal S1, S2  Abdomen  obese, soft, nontender. Bowel sounds present. No hernia   Rectal - deferred, not clinically indicated  Neurological - alert, oriented, normal speech, no focal findings or movement disorder noted.  Musculoskeletal - no joint tenderness, deformity or swelling  Extremities - peripheral pulses normal, bilateral pedal edema.  Chronic skin changes.  Skin - normal coloration and turgor, no rashes   Psych: Normal mood         BMP:    Recent Labs     03/23/25 2124 03/24/25  0532 03/25/25  0526    139 140   K 3.5 3.5 3.5    103 101   CO2 20 23 29   BUN 16 13 8   CREATININE 0.62* 0.65* 0.69*   GLUCOSE 200* 158* 145*   MG 1.6* 1.8 1.8    .  MG:3,PHOS:3)@  Ionized Calcium: No components found for: \"IONCA\"  CBC:   Recent Labs     03/24/25  0532 03/25/25  0526   WBC 9.6 7.5   HGB 11.7* 11.8*    304      ABG: No results for input(s): \"PH\", \"PCO2\", \"PO2\" in the last 72 hours.        Assessment and Plan:         Impression:     -  Acute respiratory insufficiency.  Currently on 2 L of oxygen.  -Hypertensive emergency.  This is overall better  -Systolic heart failure with mild exacerbation.  -Lower extremity edema.  -Volume overload.  -Recent COVID-19 pneumonia.  -Obstructive sleep apnea.               Recommendations:     - Oxygen to keep saturation above 90%.  - IV Lasix.  Strict intake and output measurement.  Watch urine output closely.  -  Blood pressure control  - Continue cardiac medications.  - Low-salt diet.  - Maintain potassium above 4 and magnesium above 2.  - Positive airway pressure at nighttime.  -Have not been able to find a sleep study.  Will try to reach out to [facility].    Alternatively, can expedite a split-night sleep study as an outpatient.  I will also order an ABG with on room air in AM.            Electronically signed by Marilu Gamez MD on 3/25/2025 at 8:24 AM

## 2025-03-26 ENCOUNTER — TELEPHONE (OUTPATIENT)
Dept: PULMONOLOGY | Age: 35
End: 2025-03-26

## 2025-03-26 DIAGNOSIS — G47.30 SLEEP-DISORDERED BREATHING: Primary | ICD-10-CM

## 2025-03-26 LAB
ALBUMIN SERPL-MCNC: 3.6 G/DL (ref 3.5–4.6)
ALP SERPL-CCNC: 110 U/L (ref 35–104)
ALT SERPL-CCNC: 20 U/L (ref 0–41)
ANION GAP SERPL CALCULATED.3IONS-SCNC: 12 MEQ/L (ref 9–15)
AST SERPL-CCNC: 22 U/L (ref 0–40)
BASE EXCESS ARTERIAL: 10 (ref -3–3)
BASOPHILS # BLD: 0 K/UL (ref 0–0.2)
BASOPHILS NFR BLD: 0.3 %
BILIRUB SERPL-MCNC: 0.9 MG/DL (ref 0.2–0.7)
BUN SERPL-MCNC: 9 MG/DL (ref 6–20)
CALCIUM IONIZED: 1.12 MMOL/L (ref 1.12–1.32)
CALCIUM SERPL-MCNC: 8.6 MG/DL (ref 8.5–9.9)
CHLORIDE SERPL-SCNC: 99 MEQ/L (ref 95–107)
CO2 SERPL-SCNC: 30 MEQ/L (ref 20–31)
CREAT SERPL-MCNC: 0.74 MG/DL (ref 0.7–1.2)
EOSINOPHIL # BLD: 0.4 K/UL (ref 0–0.7)
EOSINOPHIL NFR BLD: 5.1 %
ERYTHROCYTE [DISTWIDTH] IN BLOOD BY AUTOMATED COUNT: 16.3 % (ref 11.5–14.5)
GLOBULIN SER CALC-MCNC: 3.2 G/DL (ref 2.3–3.5)
GLUCOSE BLD-MCNC: 132 MG/DL (ref 70–99)
GLUCOSE BLD-MCNC: 149 MG/DL (ref 70–99)
GLUCOSE BLD-MCNC: 152 MG/DL (ref 70–99)
GLUCOSE BLD-MCNC: 152 MG/DL (ref 70–99)
GLUCOSE BLD-MCNC: 175 MG/DL (ref 70–99)
GLUCOSE SERPL-MCNC: 135 MG/DL (ref 70–99)
HCO3 ARTERIAL: 33.2 MMOL/L (ref 21–29)
HCT VFR BLD AUTO: 36.8 % (ref 42–52)
HCT VFR BLD AUTO: 38 % (ref 41–53)
HGB BLD CALC-MCNC: 13 GM/DL (ref 13.5–17.5)
HGB BLD-MCNC: 12.1 G/DL (ref 14–18)
LACTATE: 0.9 MMOL/L (ref 0.4–2)
LYMPHOCYTES # BLD: 1.4 K/UL (ref 1–4.8)
LYMPHOCYTES NFR BLD: 18.2 %
MAGNESIUM SERPL-MCNC: 1.8 MG/DL (ref 1.7–2.4)
MCH RBC QN AUTO: 27.8 PG (ref 27–31.3)
MCHC RBC AUTO-ENTMCNC: 32.9 % (ref 33–37)
MCV RBC AUTO: 84.6 FL (ref 79–92.2)
MONOCYTES # BLD: 0.6 K/UL (ref 0.2–0.8)
MONOCYTES NFR BLD: 7.4 %
NEUTROPHILS # BLD: 5.3 K/UL (ref 1.4–6.5)
NEUTS SEG NFR BLD: 68.6 %
O2 SAT, ARTERIAL: 94 % (ref 93–100)
PCO2 ARTERIAL: 46 MM HG (ref 35–45)
PERFORMED ON: ABNORMAL
PH ARTERIAL: 7.47 (ref 7.35–7.45)
PLATELET # BLD AUTO: 313 K/UL (ref 130–400)
PO2 ARTERIAL: 69 MM HG (ref 75–108)
POC CHLORIDE: 97 MEQ/L (ref 99–110)
POC CREATININE: 0.8 MG/DL (ref 0.8–1.3)
POC FIO2: 2
POC SAMPLE TYPE: ABNORMAL
POTASSIUM SERPL-SCNC: 3.2 MEQ/L (ref 3.4–4.9)
POTASSIUM SERPL-SCNC: 3.5 MEQ/L (ref 3.5–5.1)
PROT SERPL-MCNC: 6.8 G/DL (ref 6.3–8)
RBC # BLD AUTO: 4.35 M/UL (ref 4.7–6.1)
SODIUM BLD-SCNC: 142 MEQ/L (ref 136–145)
SODIUM SERPL-SCNC: 141 MEQ/L (ref 135–144)
TCO2 ARTERIAL: 35 MMOL/L (ref 21–32)
WBC # BLD AUTO: 7.7 K/UL (ref 4.8–10.8)

## 2025-03-26 PROCEDURE — 6370000000 HC RX 637 (ALT 250 FOR IP): Performed by: NURSE PRACTITIONER

## 2025-03-26 PROCEDURE — 6360000002 HC RX W HCPCS: Performed by: INTERNAL MEDICINE

## 2025-03-26 PROCEDURE — 82330 ASSAY OF CALCIUM: CPT

## 2025-03-26 PROCEDURE — 82565 ASSAY OF CREATININE: CPT

## 2025-03-26 PROCEDURE — 2500000003 HC RX 250 WO HCPCS: Performed by: NURSE PRACTITIONER

## 2025-03-26 PROCEDURE — 85014 HEMATOCRIT: CPT

## 2025-03-26 PROCEDURE — 6370000000 HC RX 637 (ALT 250 FOR IP): Performed by: INTERNAL MEDICINE

## 2025-03-26 PROCEDURE — 99232 SBSQ HOSP IP/OBS MODERATE 35: CPT | Performed by: INTERNAL MEDICINE

## 2025-03-26 PROCEDURE — 2700000000 HC OXYGEN THERAPY PER DAY

## 2025-03-26 PROCEDURE — 2060000000 HC ICU INTERMEDIATE R&B

## 2025-03-26 PROCEDURE — 94660 CPAP INITIATION&MGMT: CPT

## 2025-03-26 PROCEDURE — 85025 COMPLETE CBC W/AUTO DIFF WBC: CPT

## 2025-03-26 PROCEDURE — 84132 ASSAY OF SERUM POTASSIUM: CPT

## 2025-03-26 PROCEDURE — 82435 ASSAY OF BLOOD CHLORIDE: CPT

## 2025-03-26 PROCEDURE — 99233 SBSQ HOSP IP/OBS HIGH 50: CPT | Performed by: INTERNAL MEDICINE

## 2025-03-26 PROCEDURE — 83735 ASSAY OF MAGNESIUM: CPT

## 2025-03-26 PROCEDURE — 82948 REAGENT STRIP/BLOOD GLUCOSE: CPT

## 2025-03-26 PROCEDURE — 94761 N-INVAS EAR/PLS OXIMETRY MLT: CPT

## 2025-03-26 PROCEDURE — 82803 BLOOD GASES ANY COMBINATION: CPT

## 2025-03-26 PROCEDURE — 6360000002 HC RX W HCPCS: Performed by: NURSE PRACTITIONER

## 2025-03-26 PROCEDURE — 80053 COMPREHEN METABOLIC PANEL: CPT

## 2025-03-26 PROCEDURE — 36415 COLL VENOUS BLD VENIPUNCTURE: CPT

## 2025-03-26 PROCEDURE — 84295 ASSAY OF SERUM SODIUM: CPT

## 2025-03-26 PROCEDURE — 36600 WITHDRAWAL OF ARTERIAL BLOOD: CPT

## 2025-03-26 PROCEDURE — 83605 ASSAY OF LACTIC ACID: CPT

## 2025-03-26 RX ADMIN — Medication 10 ML: at 21:01

## 2025-03-26 RX ADMIN — CARVEDILOL 3.12 MG: 3.12 TABLET, FILM COATED ORAL at 16:35

## 2025-03-26 RX ADMIN — FUROSEMIDE 80 MG: 10 INJECTION, SOLUTION INTRAMUSCULAR; INTRAVENOUS at 17:33

## 2025-03-26 RX ADMIN — EMPAGLIFLOZIN 10 MG: 10 TABLET, FILM COATED ORAL at 16:35

## 2025-03-26 RX ADMIN — SACUBITRIL AND VALSARTAN 1 TABLET: 24; 26 TABLET, FILM COATED ORAL at 08:03

## 2025-03-26 RX ADMIN — Medication 10 ML: at 08:05

## 2025-03-26 RX ADMIN — ASPIRIN 81 MG: 81 TABLET, CHEWABLE ORAL at 08:03

## 2025-03-26 RX ADMIN — ENOXAPARIN SODIUM 60 MG: 100 INJECTION SUBCUTANEOUS at 21:01

## 2025-03-26 RX ADMIN — FUROSEMIDE 80 MG: 10 INJECTION, SOLUTION INTRAMUSCULAR; INTRAVENOUS at 08:03

## 2025-03-26 RX ADMIN — ATORVASTATIN CALCIUM 10 MG: 10 TABLET, FILM COATED ORAL at 21:01

## 2025-03-26 RX ADMIN — CARVEDILOL 3.12 MG: 3.12 TABLET, FILM COATED ORAL at 08:03

## 2025-03-26 RX ADMIN — SACUBITRIL AND VALSARTAN 1 TABLET: 24; 26 TABLET, FILM COATED ORAL at 21:01

## 2025-03-26 RX ADMIN — ENOXAPARIN SODIUM 60 MG: 100 INJECTION SUBCUTANEOUS at 08:03

## 2025-03-26 NOTE — CARE COORDINATION
IV DIURESIS CONTINUES. DC PLAN REMAINS HOME ONCE MEDICALLY CLEARED. PT MAY NEED HOME 02 EVAL PRIOR TO DC.

## 2025-03-26 NOTE — PROGRESS NOTES
Hospitalist Progress Note      PCP: None, None    Date of Admission: 3/23/2025    Chief Complaint:    Chief Complaint   Patient presents with    Shortness of Breath     Subjective:  Patient denies fevers, chills, sweats, CP, SOB, diarrhea or burning micturition.  12 point ROS negative other than mentioned above     Medications:  Reviewed    Infusion Medications    sodium chloride      dextrose       Scheduled Medications    empagliflozin  10 mg Oral Daily    sodium chloride flush  5-40 mL IntraVENous 2 times per day    enoxaparin  60 mg SubCUTAneous BID    insulin lispro  0-8 Units SubCUTAneous 4x Daily AC & HS    atorvastatin  10 mg Oral Nightly    aspirin  81 mg Oral Daily    furosemide  80 mg IntraVENous BID    sacubitril-valsartan  1 tablet Oral BID    carvedilol  3.125 mg Oral BID      PRN Meds: sodium chloride flush, sodium chloride, potassium chloride **OR** potassium alternative oral replacement **OR** potassium chloride, magnesium sulfate, ondansetron **OR** ondansetron, polyethylene glycol, acetaminophen **OR** acetaminophen, glucose, dextrose bolus **OR** dextrose bolus, glucagon (rDNA), dextrose, labetalol, albuterol sulfate HFA      Intake/Output Summary (Last 24 hours) at 3/26/2025 1902  Last data filed at 3/26/2025 1737  Gross per 24 hour   Intake 820 ml   Output 6900 ml   Net -6080 ml     Exam:    /74   Pulse 93   Temp 97.3 °F (36.3 °C) (Oral)   Resp 18   Ht 2.032 m (6' 8\")   Wt (!) 263.7 kg (581 lb 5.7 oz)   SpO2 93%   BMI 63.86 kg/m²     General appearance: obese.  HEENT:  Conjunctivae/corneas clear.  Neck: Trachea midline.  Respiratory:  diminished  Cardiovascular: Regular rate and rhythm  Abdomen: Soft, non-tender, non-distended with normal bowel sounds.  Musculoskeletal: +3 edema  Neuro: Non Focal.     Labs:   Recent Labs     03/24/25  0532 03/25/25  0526 03/26/25  0512 03/26/25  1218   WBC 9.6 7.5 7.7  --    HGB 11.7* 11.8* 12.1* 13.0*   HCT 36.3* 37.7* 36.8*  --     304  treatment should be done in out pt setting by pt PCP and other out pt providers.     In addition to examining and evaluating pt, I spent additional time explaining care, normal and abnormal findings, and treatment plan. All of pt questions were answered. Counseling, diet and education were  provided. Case will be discussed with nursing staff when appropriate. Family will be updated if and when appropriate.      Diet: ADULT DIET; Regular; 4 carb choices (60 gm/meal); Low Fat/Low Chol/High Fiber/2 gm Na; Low Sodium (2 gm); 2000 ml    Code Status: Full Code    PT/OT Eval     Electronically signed by Magnus Calderon MD on 3/26/2025 at 7:02 PM

## 2025-03-26 NOTE — PROGRESS NOTES
Cardiology Follow up Note    Subjective:  Feels better. Diuresing well. Less leg swelling. No SOB.       Review of Systems:   As noted in the HPI*    Objective:  /74   Pulse 93   Temp 97.3 °F (36.3 °C) (Oral)   Resp 18   Ht 2.032 m (6' 8\")   Wt (!) 263.7 kg (581 lb 5.7 oz)   SpO2 93%   BMI 63.86 kg/m²   Vitals stable.   Constitutional: alert, cooperative, in no distress  Eyes: Conjunctiva clear; no scleral icturus. PERRLA   Respiratory: diminished breath sounds bilaterally.   Musculoskeletal: No chest wall tenderness. No clubbing or cyanosis.   Cardiovascular: regular rate. Distant heart sounds. No carotid bruit. No JVD. Pulses 2+ and symmetric. 3+ edema with chronic venous changes.   GI: soft, non-tender, non-distended. Bowel sounds normal. Morbidly obese.  MSK: extremities normal, atraumatic, no clubbing. No chest wall pain.    Neurologic: Cranial nerves grossly intact.  No focal motor and/or sensory deficits.  Skin: No rashes or lesions. No cyanosis.       Intake/Output Summary (Last 24 hours) at 3/26/2025 1553  Last data filed at 3/26/2025 1356  Gross per 24 hour   Intake 960 ml   Output 8200 ml   Net -7240 ml       Medications:  sodium chloride flush, 5-40 mL, 2 times per day  enoxaparin, 60 mg, BID  insulin lispro, 0-8 Units, 4x Daily AC & HS  atorvastatin, 10 mg, Nightly  aspirin, 81 mg, Daily  furosemide, 80 mg, BID  sacubitril-valsartan, 1 tablet, BID  carvedilol, 3.125 mg, BID WC      sodium chloride  dextrose      Recent Labs     03/23/25  2124 03/24/25  0532 03/25/25  0526 03/26/25  0512 03/26/25  1218   HGB 12.1* 11.7* 11.8* 12.1* 13.0*   WBC 10.1 9.6 7.5 7.7  --     327 304 313  --     139 140 141  --    K 3.5 3.5 3.5 3.2*  --    CO2 20 23 29 30  --    BUN 16 13 8 9  --    MG 1.6* 1.8 1.8 1.8  --    INR 1.2  --   --   --   --      Cr 0.74    Telemetry: My independent review reveals SR 90s       Echo (TTE) complete (PRN contrast/bubble/strain/3D) 03/24/2025  Interpretation

## 2025-03-26 NOTE — PROGRESS NOTES
Pulmonary Progress Note    3/26/2025 1:45 PM    Subjective:   Admit Date: 3/23/2025  PCP: None, None    Chief Complaint   Patient presents with    Shortness of Breath     Interval History: Doing well overall.  No major issues.  Continues to be on IV Lasix.  Urine output is good.  Currently on room air.  ABG this morning is decent.    14 points review of systems has been obtained and negative except to was mentioned in HPI.     Medications:   Scheduled Meds:   sodium chloride flush  5-40 mL IntraVENous 2 times per day    enoxaparin  60 mg SubCUTAneous BID    insulin lispro  0-8 Units SubCUTAneous 4x Daily AC & HS    atorvastatin  10 mg Oral Nightly    aspirin  81 mg Oral Daily    furosemide  80 mg IntraVENous BID    sacubitril-valsartan  1 tablet Oral BID    carvedilol  3.125 mg Oral BID WC     Continuous Infusions:   sodium chloride      dextrose           Objective:   Vitals:   Temp (24hrs), Av.8 °F (36.6 °C), Min:97.5 °F (36.4 °C), Max:98.2 °F (36.8 °C)    BP 99/67   Pulse 92   Temp 97.7 °F (36.5 °C) (Oral)   Resp 18   Ht 2.032 m (6' 8\")   Wt (!) 263.7 kg (581 lb 5.7 oz)   SpO2 94%   BMI 63.86 kg/m²   I/O:24HR INTAKE/OUTPUT:    Intake/Output Summary (Last 24 hours) at 3/26/2025 1345  Last data filed at 3/26/2025 1032  Gross per 24 hour   Intake 960 ml   Output 7000 ml   Net -6040 ml      0701 -  0700  In: 1200 [P.O.:1200]  Out: 7650 [Urine:7650]  CVP:        Physical Exam:   General appearance - alert, ill appearing, and in mild distress  Mental status - alert, oriented to person, place, and time  Eyes - pupils equal and reactive, extraocular eye movements intact. Normal sclera and conjunctiva   Nose - normal and patent, nasal cannula oxygen  Neck -thick neck, supple, no significant adenopathy. No thyromegaly.   Chest -diminished bilaterally to auscultation, no wheezes, rales or rhonchi, symmetric air entry  Heart - normal rate, regular rhythm, normal S1, S2  Abdomen obese, soft, nontender.  Bowel sounds present. No hernia   Rectal - deferred, not clinically indicated  Neurological - alert, oriented, normal speech, no focal findings or movement disorder noted.  Musculoskeletal - no joint tenderness, deformity or swelling  Extremities - peripheral pulses normal, bilateral pedal edema.  Chronic skin changes.  Skin - normal coloration and turgor, no rashes   Psych: Normal mood         BMP:    Recent Labs     03/24/25  0532 03/25/25  0526 03/26/25  0512 03/26/25  1218    140 141  --    K 3.5 3.5 3.2*  --     101 99  --    CO2 23 29 30  --    BUN 13 8 9  --    CREATININE 0.65* 0.69* 0.74 0.8   GLUCOSE 158* 145* 135*  --    MG 1.8 1.8 1.8  --     .  MG:3,PHOS:3)@  Ionized Calcium: No components found for: \"IONCA\"  CBC:   Recent Labs     03/25/25  0526 03/26/25  0512 03/26/25  1218   WBC 7.5 7.7  --    HGB 11.8* 12.1* 13.0*    313  --       ABG: No results for input(s): \"PH\", \"PCO2\", \"PO2\" in the last 72 hours.        Assessment and Plan:         Impression:     -  Acute respiratory insufficiency.  Currently on 2 L of oxygen.  -Hypertensive emergency.  This is overall better  -Systolic heart failure with mild exacerbation.  -Lower extremity edema.  -Volume overload.  -Recent COVID-19 pneumonia.  -Obstructive sleep apnea.               Recommendations:     - Oxygen to keep saturation above 90%.  - IV Lasix.  Strict intake and output measurement.  Watch urine output closely.  -  Blood pressure control  - Continue cardiac medications.  - Low-salt diet.  - Maintain potassium above 4 and magnesium above 2.  - Positive airway pressure at nighttime.  -Have not been able to find a sleep study.  Continue AutoPap while here we will arrange for split-night sleep study as an outpatient.              Electronically signed by Marilu Gamez MD on 3/26/2025 at 1:45 PM

## 2025-03-27 LAB
ALBUMIN SERPL-MCNC: 3.6 G/DL (ref 3.5–4.6)
ALP SERPL-CCNC: 113 U/L (ref 35–104)
ALT SERPL-CCNC: 21 U/L (ref 0–41)
ANION GAP SERPL CALCULATED.3IONS-SCNC: 11 MEQ/L (ref 9–15)
AST SERPL-CCNC: 26 U/L (ref 0–40)
BASOPHILS # BLD: 0 K/UL (ref 0–0.2)
BASOPHILS NFR BLD: 0.4 %
BILIRUB SERPL-MCNC: 0.7 MG/DL (ref 0.2–0.7)
BUN SERPL-MCNC: 10 MG/DL (ref 6–20)
CALCIUM SERPL-MCNC: 8.9 MG/DL (ref 8.5–9.9)
CHLORIDE SERPL-SCNC: 99 MEQ/L (ref 95–107)
CO2 SERPL-SCNC: 32 MEQ/L (ref 20–31)
CREAT SERPL-MCNC: 0.9 MG/DL (ref 0.7–1.2)
EOSINOPHIL # BLD: 0.4 K/UL (ref 0–0.7)
EOSINOPHIL NFR BLD: 4.4 %
ERYTHROCYTE [DISTWIDTH] IN BLOOD BY AUTOMATED COUNT: 16.2 % (ref 11.5–14.5)
GLOBULIN SER CALC-MCNC: 2.9 G/DL (ref 2.3–3.5)
GLUCOSE BLD-MCNC: 129 MG/DL (ref 70–99)
GLUCOSE BLD-MCNC: 143 MG/DL (ref 70–99)
GLUCOSE BLD-MCNC: 148 MG/DL (ref 70–99)
GLUCOSE BLD-MCNC: 163 MG/DL (ref 70–99)
GLUCOSE SERPL-MCNC: 142 MG/DL (ref 70–99)
HCT VFR BLD AUTO: 39.6 % (ref 42–52)
HGB BLD-MCNC: 12.8 G/DL (ref 14–18)
LYMPHOCYTES # BLD: 2.1 K/UL (ref 1–4.8)
LYMPHOCYTES NFR BLD: 25.5 %
MAGNESIUM SERPL-MCNC: 2.3 MG/DL (ref 1.7–2.4)
MCH RBC QN AUTO: 27.5 PG (ref 27–31.3)
MCHC RBC AUTO-ENTMCNC: 32.3 % (ref 33–37)
MCV RBC AUTO: 85.2 FL (ref 79–92.2)
MONOCYTES # BLD: 0.7 K/UL (ref 0.2–0.8)
MONOCYTES NFR BLD: 8.3 %
NEUTROPHILS # BLD: 5 K/UL (ref 1.4–6.5)
NEUTS SEG NFR BLD: 61 %
PERFORMED ON: ABNORMAL
PLATELET # BLD AUTO: 336 K/UL (ref 130–400)
POTASSIUM SERPL-SCNC: 3.9 MEQ/L (ref 3.4–4.9)
PROT SERPL-MCNC: 6.5 G/DL (ref 6.3–8)
RBC # BLD AUTO: 4.65 M/UL (ref 4.7–6.1)
SODIUM SERPL-SCNC: 142 MEQ/L (ref 135–144)
WBC # BLD AUTO: 8.2 K/UL (ref 4.8–10.8)

## 2025-03-27 PROCEDURE — 6360000002 HC RX W HCPCS: Performed by: NURSE PRACTITIONER

## 2025-03-27 PROCEDURE — 6370000000 HC RX 637 (ALT 250 FOR IP): Performed by: INTERNAL MEDICINE

## 2025-03-27 PROCEDURE — 94660 CPAP INITIATION&MGMT: CPT

## 2025-03-27 PROCEDURE — 99232 SBSQ HOSP IP/OBS MODERATE 35: CPT | Performed by: INTERNAL MEDICINE

## 2025-03-27 PROCEDURE — 2060000000 HC ICU INTERMEDIATE R&B

## 2025-03-27 PROCEDURE — 83735 ASSAY OF MAGNESIUM: CPT

## 2025-03-27 PROCEDURE — 99233 SBSQ HOSP IP/OBS HIGH 50: CPT | Performed by: INTERNAL MEDICINE

## 2025-03-27 PROCEDURE — 80053 COMPREHEN METABOLIC PANEL: CPT

## 2025-03-27 PROCEDURE — 2500000003 HC RX 250 WO HCPCS: Performed by: NURSE PRACTITIONER

## 2025-03-27 PROCEDURE — 6370000000 HC RX 637 (ALT 250 FOR IP): Performed by: NURSE PRACTITIONER

## 2025-03-27 PROCEDURE — 36415 COLL VENOUS BLD VENIPUNCTURE: CPT

## 2025-03-27 PROCEDURE — 85025 COMPLETE CBC W/AUTO DIFF WBC: CPT

## 2025-03-27 PROCEDURE — 6360000002 HC RX W HCPCS: Performed by: INTERNAL MEDICINE

## 2025-03-27 PROCEDURE — 2700000000 HC OXYGEN THERAPY PER DAY

## 2025-03-27 RX ADMIN — EMPAGLIFLOZIN 10 MG: 10 TABLET, FILM COATED ORAL at 09:29

## 2025-03-27 RX ADMIN — Medication 10 ML: at 09:29

## 2025-03-27 RX ADMIN — ENOXAPARIN SODIUM 60 MG: 100 INJECTION SUBCUTANEOUS at 20:24

## 2025-03-27 RX ADMIN — Medication 10 ML: at 20:24

## 2025-03-27 RX ADMIN — SACUBITRIL AND VALSARTAN 1 TABLET: 24; 26 TABLET, FILM COATED ORAL at 20:24

## 2025-03-27 RX ADMIN — CARVEDILOL 3.12 MG: 3.12 TABLET, FILM COATED ORAL at 09:28

## 2025-03-27 RX ADMIN — SACUBITRIL AND VALSARTAN 1 TABLET: 24; 26 TABLET, FILM COATED ORAL at 09:28

## 2025-03-27 RX ADMIN — CARVEDILOL 3.12 MG: 3.12 TABLET, FILM COATED ORAL at 17:16

## 2025-03-27 RX ADMIN — ATORVASTATIN CALCIUM 10 MG: 10 TABLET, FILM COATED ORAL at 20:24

## 2025-03-27 RX ADMIN — ASPIRIN 81 MG: 81 TABLET, CHEWABLE ORAL at 09:29

## 2025-03-27 RX ADMIN — ENOXAPARIN SODIUM 60 MG: 100 INJECTION SUBCUTANEOUS at 09:29

## 2025-03-27 RX ADMIN — FUROSEMIDE 80 MG: 10 INJECTION, SOLUTION INTRAMUSCULAR; INTRAVENOUS at 17:16

## 2025-03-27 RX ADMIN — FUROSEMIDE 80 MG: 10 INJECTION, SOLUTION INTRAMUSCULAR; INTRAVENOUS at 09:28

## 2025-03-27 ASSESSMENT — PAIN SCALES - GENERAL: PAINLEVEL_OUTOF10: 0

## 2025-03-27 NOTE — PROGRESS NOTES
[facility]   MUSIC THERAPY  Declined Visit       Date:  3/27/2025        Patient Name: Attila Salinas       MRN: [id number]        YOB: 1990 (35 y.o.)       Gender: male          RESTRICTIONS/PRECAUTIONS:             Subjective/Observation:   Patient declined participating in individual music therapy session at 10:14am stating he was not interested.      Assessment/Plan:      [] Patient would like to have music therapy, just not today. [facility] if [facility].      [] Patient would like to have music therapy another time, however [facility]        *If patient is still on unit, or readmitted at another time, [facility] will attempt to see patient.      [x] Patient does not want music therapy. [facility] will not [facility] to [facility] pt again unless pt specifically requests.       JOHN Hartman, [facility]    3/27/2025  Electronically signed by Gloria Colbert on 3/27/2025 at 12:52 PM

## 2025-03-27 NOTE — PROGRESS NOTES
Pulmonary Progress Note    3/27/2025 3:59 PM    Subjective:   Admit Date: 3/23/2025  PCP: None, None    Chief Complaint   Patient presents with    Shortness of Breath     Interval History: Diuresing well.  Continues to be on room air.  Urine output is 6 L in last 24 hours.    14 points review of systems has been obtained and negative except to was mentioned in HPI.     Medications:   Scheduled Meds:   empagliflozin  10 mg Oral Daily    sodium chloride flush  5-40 mL IntraVENous 2 times per day    enoxaparin  60 mg SubCUTAneous BID    insulin lispro  0-8 Units SubCUTAneous 4x Daily AC & HS    atorvastatin  10 mg Oral Nightly    aspirin  81 mg Oral Daily    furosemide  80 mg IntraVENous BID    sacubitril-valsartan  1 tablet Oral BID    carvedilol  3.125 mg Oral BID WC     Continuous Infusions:   sodium chloride      dextrose           Objective:   Vitals:   Temp (24hrs), Av.7 °F (36.5 °C), Min:97.3 °F (36.3 °C), Max:98.1 °F (36.7 °C)    BP (!) 136/94   Pulse 91   Temp 97.9 °F (36.6 °C) (Oral)   Resp 20   Ht 2.032 m (6' 8\")   Wt (!) 263.8 kg (581 lb 9.6 oz)   SpO2 95%   BMI 63.89 kg/m²   I/O:24HR INTAKE/OUTPUT:    Intake/Output Summary (Last 24 hours) at 3/27/2025 1559  Last data filed at 3/27/2025 1440  Gross per 24 hour   Intake 1700 ml   Output 6300 ml   Net -4600 ml      0701 -  0700  In: 1280 [P.O.:1280]  Out: 6900 [Urine:6900]  CVP:        Physical Exam:   General appearance - alert, ill appearing, and in mild distress  Mental status - alert, oriented to person, place, and time  Eyes - pupils equal and reactive, extraocular eye movements intact. Normal sclera and conjunctiva   Nose - normal and patent, nasal cannula oxygen  Neck -thick neck, supple, no significant adenopathy. No thyromegaly.   Chest -diminished bilaterally to auscultation, no wheezes, rales or rhonchi, symmetric air entry  Heart - normal rate, regular rhythm, normal S1, S2  Abdomen obese, soft, nontender. Bowel sounds

## 2025-03-27 NOTE — CARE COORDINATION
PT REMAINS ON IV LASIX.  CURRENTLY ON RA, WILL NEED OP SLEEP STUDY PER PULM.  DC PLAN HOME ONCE CLEARED BY DRS.

## 2025-03-27 NOTE — PROGRESS NOTES
Cardiology Follow up Note    Subjective:  Feels better. Continues to diurese well. Less leg swelling. No CP or SOB.       Review of Systems:   As noted in the HPI*    Objective:  BP (!) 136/94   Pulse 91   Temp 97.9 °F (36.6 °C) (Oral)   Resp 20   Ht 2.032 m (6' 8\")   Wt (!) 263.8 kg (581 lb 9.6 oz)   SpO2 95%   BMI 63.89 kg/m²   Constitutional: alert, cooperative, in no distress  Eyes: Conjunctiva clear; no scleral icturus. PERRLA   Respiratory: diminished breath sounds bilaterally.   Musculoskeletal: No chest wall tenderness. No clubbing or cyanosis.   Cardiovascular: regular rate. Distant heart sounds. No carotid bruit. No JVD. Pulses 2+ and symmetric. 3+ edema with chronic venous changes.   GI: soft, non-tender, non-distended. Bowel sounds normal. Morbidly obese.  MSK: extremities normal, atraumatic, no clubbing. No chest wall pain.    Neurologic: Cranial nerves grossly intact.  No focal motor and/or sensory deficits.  Skin: No rashes or lesions. No cyanosis.       Intake/Output Summary (Last 24 hours) at 3/27/2025 1658  Last data filed at 3/27/2025 1440  Gross per 24 hour   Intake 1700 ml   Output 6300 ml   Net -4600 ml       Medications:  empagliflozin, 10 mg, Daily  sodium chloride flush, 5-40 mL, 2 times per day  enoxaparin, 60 mg, BID  insulin lispro, 0-8 Units, 4x Daily AC & HS  atorvastatin, 10 mg, Nightly  aspirin, 81 mg, Daily  furosemide, 80 mg, BID  sacubitril-valsartan, 1 tablet, BID  carvedilol, 3.125 mg, BID WC      sodium chloride  dextrose      Recent Labs     03/25/25  0526 03/26/25  0512 03/26/25  1218 03/27/25  0534   HGB 11.8* 12.1* 13.0* 12.8*   WBC 7.5 7.7  --  8.2    313  --  336    141  --  142   K 3.5 3.2*  --  3.9   CO2 29 30  --  32*   BUN 8 9  --  10   MG 1.8 1.8  --  2.3     Cr 0.0.90    Telemetry: My independent review reveals SR 90s       Echo (TTE) complete (PRN contrast/bubble/strain/3D) 03/24/2025  Interpretation Summary    Left Ventricle: Severely reduced

## 2025-03-27 NOTE — PROGRESS NOTES
Hospitalist Progress Note      PCP: None, None    Date of Admission: 3/23/2025    Chief Complaint:    Chief Complaint   Patient presents with    Shortness of Breath     Subjective:  Patient denies fevers, chills, sweats, CP, SOB, diarrhea or burning micturition.  12 point ROS negative other than mentioned above     Medications:  Reviewed    Infusion Medications    sodium chloride      dextrose       Scheduled Medications    empagliflozin  10 mg Oral Daily    sodium chloride flush  5-40 mL IntraVENous 2 times per day    enoxaparin  60 mg SubCUTAneous BID    insulin lispro  0-8 Units SubCUTAneous 4x Daily AC & HS    atorvastatin  10 mg Oral Nightly    aspirin  81 mg Oral Daily    furosemide  80 mg IntraVENous BID    sacubitril-valsartan  1 tablet Oral BID    carvedilol  3.125 mg Oral BID      PRN Meds: sodium chloride flush, sodium chloride, potassium chloride **OR** potassium alternative oral replacement **OR** potassium chloride, magnesium sulfate, ondansetron **OR** ondansetron, polyethylene glycol, acetaminophen **OR** acetaminophen, glucose, dextrose bolus **OR** dextrose bolus, glucagon (rDNA), dextrose, labetalol, albuterol sulfate HFA      Intake/Output Summary (Last 24 hours) at 3/27/2025 1603  Last data filed at 3/27/2025 1440  Gross per 24 hour   Intake 1700 ml   Output 6300 ml   Net -4600 ml     Exam:    BP (!) 136/94   Pulse 91   Temp 97.9 °F (36.6 °C) (Oral)   Resp 20   Ht 2.032 m (6' 8\")   Wt (!) 263.8 kg (581 lb 9.6 oz)   SpO2 95%   BMI 63.89 kg/m²     General appearance: obese.  HEENT:  Conjunctivae/corneas clear.  Neck: Trachea midline.  Respiratory:  diminished  Cardiovascular: Regular rate and rhythm  Abdomen: Soft, non-tender, non-distended with normal bowel sounds.  Musculoskeletal: +3 edema  Neuro: Non Focal.     Labs:   Recent Labs     03/25/25  0526 03/26/25  0512 03/26/25  1218 03/27/25  0534   WBC 7.5 7.7  --  8.2   HGB 11.8* 12.1* 13.0* 12.8*   HCT 37.7* 36.8*  --  39.6*

## 2025-03-28 VITALS
SYSTOLIC BLOOD PRESSURE: 108 MMHG | BODY MASS INDEX: 36.45 KG/M2 | WEIGHT: 315 LBS | RESPIRATION RATE: 18 BRPM | TEMPERATURE: 98.6 F | OXYGEN SATURATION: 96 % | HEART RATE: 95 BPM | HEIGHT: 78 IN | DIASTOLIC BLOOD PRESSURE: 67 MMHG

## 2025-03-28 LAB
ALBUMIN SERPL-MCNC: 4 G/DL (ref 3.5–4.6)
ALP SERPL-CCNC: 125 U/L (ref 35–104)
ALT SERPL-CCNC: 30 U/L (ref 0–41)
ANION GAP SERPL CALCULATED.3IONS-SCNC: 15 MEQ/L (ref 9–15)
AST SERPL-CCNC: 42 U/L (ref 0–40)
BASOPHILS # BLD: 0 K/UL (ref 0–0.2)
BASOPHILS NFR BLD: 0.3 %
BILIRUB SERPL-MCNC: 0.9 MG/DL (ref 0.2–0.7)
BUN SERPL-MCNC: 13 MG/DL (ref 6–20)
CALCIUM SERPL-MCNC: 9.4 MG/DL (ref 8.5–9.9)
CHLORIDE SERPL-SCNC: 98 MEQ/L (ref 95–107)
CO2 SERPL-SCNC: 29 MEQ/L (ref 20–31)
CREAT SERPL-MCNC: 0.82 MG/DL (ref 0.7–1.2)
EOSINOPHIL # BLD: 0.4 K/UL (ref 0–0.7)
EOSINOPHIL NFR BLD: 3.4 %
ERYTHROCYTE [DISTWIDTH] IN BLOOD BY AUTOMATED COUNT: 16.2 % (ref 11.5–14.5)
GLOBULIN SER CALC-MCNC: 3.4 G/DL (ref 2.3–3.5)
GLUCOSE BLD-MCNC: 145 MG/DL (ref 70–99)
GLUCOSE BLD-MCNC: 146 MG/DL (ref 70–99)
GLUCOSE BLD-MCNC: 152 MG/DL (ref 70–99)
GLUCOSE SERPL-MCNC: 151 MG/DL (ref 70–99)
HCT VFR BLD AUTO: 42.6 % (ref 42–52)
HGB BLD-MCNC: 13.7 G/DL (ref 14–18)
LYMPHOCYTES # BLD: 2.6 K/UL (ref 1–4.8)
LYMPHOCYTES NFR BLD: 23.8 %
MAGNESIUM SERPL-MCNC: 2.2 MG/DL (ref 1.7–2.4)
MCH RBC QN AUTO: 27.3 PG (ref 27–31.3)
MCHC RBC AUTO-ENTMCNC: 32.2 % (ref 33–37)
MCV RBC AUTO: 85 FL (ref 79–92.2)
MONOCYTES # BLD: 0.7 K/UL (ref 0.2–0.8)
MONOCYTES NFR BLD: 6.2 %
NEUTROPHILS # BLD: 7.3 K/UL (ref 1.4–6.5)
NEUTS SEG NFR BLD: 65.8 %
PERFORMED ON: ABNORMAL
PLATELET # BLD AUTO: 388 K/UL (ref 130–400)
POTASSIUM SERPL-SCNC: 3.9 MEQ/L (ref 3.4–4.9)
PROT SERPL-MCNC: 7.4 G/DL (ref 6.3–8)
RBC # BLD AUTO: 5.01 M/UL (ref 4.7–6.1)
SODIUM SERPL-SCNC: 142 MEQ/L (ref 135–144)
WBC # BLD AUTO: 11 K/UL (ref 4.8–10.8)

## 2025-03-28 PROCEDURE — 99232 SBSQ HOSP IP/OBS MODERATE 35: CPT | Performed by: INTERNAL MEDICINE

## 2025-03-28 PROCEDURE — 80053 COMPREHEN METABOLIC PANEL: CPT

## 2025-03-28 PROCEDURE — 6360000002 HC RX W HCPCS: Performed by: NURSE PRACTITIONER

## 2025-03-28 PROCEDURE — 6370000000 HC RX 637 (ALT 250 FOR IP): Performed by: INTERNAL MEDICINE

## 2025-03-28 PROCEDURE — 83735 ASSAY OF MAGNESIUM: CPT

## 2025-03-28 PROCEDURE — 94660 CPAP INITIATION&MGMT: CPT

## 2025-03-28 PROCEDURE — 2500000003 HC RX 250 WO HCPCS: Performed by: NURSE PRACTITIONER

## 2025-03-28 PROCEDURE — 6360000002 HC RX W HCPCS: Performed by: INTERNAL MEDICINE

## 2025-03-28 PROCEDURE — 85025 COMPLETE CBC W/AUTO DIFF WBC: CPT

## 2025-03-28 PROCEDURE — 36415 COLL VENOUS BLD VENIPUNCTURE: CPT

## 2025-03-28 PROCEDURE — 6370000000 HC RX 637 (ALT 250 FOR IP): Performed by: NURSE PRACTITIONER

## 2025-03-28 RX ORDER — FUROSEMIDE 40 MG/1
40 TABLET ORAL DAILY
Qty: 30 TABLET | Refills: 1 | Status: SHIPPED | OUTPATIENT
Start: 2025-03-28

## 2025-03-28 RX ORDER — CARVEDILOL 3.12 MG/1
3.12 TABLET ORAL 2 TIMES DAILY WITH MEALS
Qty: 60 TABLET | Refills: 3 | Status: SHIPPED | OUTPATIENT
Start: 2025-03-28

## 2025-03-28 RX ORDER — ASPIRIN 81 MG/1
81 TABLET, CHEWABLE ORAL DAILY
Qty: 30 TABLET | Refills: 3 | Status: SHIPPED | OUTPATIENT
Start: 2025-03-29

## 2025-03-28 RX ORDER — POTASSIUM CHLORIDE 1500 MG/1
20 TABLET, EXTENDED RELEASE ORAL DAILY
Qty: 30 TABLET | Refills: 1 | Status: SHIPPED | OUTPATIENT
Start: 2025-03-28

## 2025-03-28 RX ADMIN — SACUBITRIL AND VALSARTAN 1 TABLET: 24; 26 TABLET, FILM COATED ORAL at 09:53

## 2025-03-28 RX ADMIN — FUROSEMIDE 80 MG: 10 INJECTION, SOLUTION INTRAMUSCULAR; INTRAVENOUS at 09:54

## 2025-03-28 RX ADMIN — EMPAGLIFLOZIN 10 MG: 10 TABLET, FILM COATED ORAL at 09:53

## 2025-03-28 RX ADMIN — ASPIRIN 81 MG: 81 TABLET, CHEWABLE ORAL at 09:53

## 2025-03-28 RX ADMIN — CARVEDILOL 3.12 MG: 3.12 TABLET, FILM COATED ORAL at 17:49

## 2025-03-28 RX ADMIN — ENOXAPARIN SODIUM 60 MG: 100 INJECTION SUBCUTANEOUS at 09:54

## 2025-03-28 RX ADMIN — CARVEDILOL 3.12 MG: 3.12 TABLET, FILM COATED ORAL at 09:52

## 2025-03-28 RX ADMIN — FUROSEMIDE 80 MG: 10 INJECTION, SOLUTION INTRAMUSCULAR; INTRAVENOUS at 17:49

## 2025-03-28 RX ADMIN — Medication 10 ML: at 09:58

## 2025-03-28 ASSESSMENT — PAIN SCALES - GENERAL
PAINLEVEL_OUTOF10: 0
PAINLEVEL_OUTOF10: 0

## 2025-03-28 NOTE — PLAN OF CARE
Problem: Chronic Conditions and Co-morbidities  Goal: Patient's chronic conditions and co-morbidity symptoms are monitored and maintained or improved  3/28/2025 1309 by Jagruti Camarena RN  Outcome: Progressing  Flowsheets (Taken 3/28/2025 0950)  Care Plan - Patient's Chronic Conditions and Co-Morbidity Symptoms are Monitored and Maintained or Improved: Monitor and assess patient's chronic conditions and comorbid symptoms for stability, deterioration, or improvement  3/28/2025 0006 by Osorio Mackay RN  Outcome: Progressing     Problem: Discharge Planning  Goal: Discharge to home or other facility with appropriate resources  3/28/2025 1309 by Jagruti Camarena RN  Outcome: Progressing  Flowsheets (Taken 3/28/2025 0950)  Discharge to home or other facility with appropriate resources:   Identify barriers to discharge with patient and caregiver   Identify discharge learning needs (meds, wound care, etc)  3/28/2025 0006 by Osorio Mackay RN  Outcome: Progressing     Problem: Safety - Adult  Goal: Free from fall injury  3/28/2025 1309 by Jagruti Camarena RN  Outcome: Progressing  3/28/2025 0006 by Osorio Mackay RN  Outcome: Progressing     Problem: Pain  Goal: Verbalizes/displays adequate comfort level or baseline comfort level  3/28/2025 1309 by Jagruti Camarena RN  Outcome: Progressing  Flowsheets (Taken 3/28/2025 0950)  Verbalizes/displays adequate comfort level or baseline comfort level: Encourage patient to monitor pain and request assistance  3/28/2025 0006 by Osorio Mackay RN  Outcome: Progressing  Flowsheets (Taken 3/27/2025 1940)  Verbalizes/displays adequate comfort level or baseline comfort level: Encourage patient to monitor pain and request assistance

## 2025-03-28 NOTE — DISCHARGE SUMMARY
Hospital Medicine Discharge Summary    Attila Salinas  :  1990  MRN:  [id number]    Admit date:  3/23/2025  Discharge date:  3/28/2025    Admitting Physician:  Chandler Hu MD  Primary Care Physician:  None, None      Discharge Diagnoses:    ***    Chief Complaint   Patient presents with    Shortness of Breath     Hospital Course:         ***    Exam on discharge: ***  BP (!) 115/55   Pulse 94   Temp 98.6 °F (37 °C) (Oral)   Resp 18   Ht 2.032 m (6' 8\")   Wt (!) 263.7 kg (581 lb 5.7 oz)   SpO2 96%   BMI 63.86 kg/m²   General appearance: No apparent distress, appears stated age and cooperative.  HEENT: Pupils equal, round, and reactive to light. Conjunctivae/corneas clear.  Neck: Supple, with full range of motion. No jugular venous distention. Trachea midline.  Respiratory:  Normal respiratory effort. Clear to auscultation, bilaterally without Rales/Wheezes/Rhonchi.  Cardiovascular: Regular rate and rhythm with normal S1/S2 without murmurs, rubs or gallops.  Abdomen: Soft, non-tender, non-distended with normal bowel sounds.  Musculoskeletal: No clubbing, cyanosis or edema bilaterally.  Full range of motion without deformity.  Skin: Skin color, texture, turgor normal.  No rashes or lesions.  Neuro: Non Focal. Symetrical motor and tone. Nl Comprehension, Alert,awake and oriented. NL CN. Symetrical tone and reflexes.  Psychiatric: Alert and oriented, thought content appropriate, normal insight  Capillary Refill: Brisk,< 3 seconds   Peripheral Pulses: +2 palpable, equal bilaterally     Patient was seen by the following consultants   Consults:  IP CONSULT TO CARDIOLOGY  IP CONSULT TO PULMONOLOGY    Significant Diagnostic Studies:    Refer to chart     Please refer to chart if no studies are shown here    Echo (TTE) complete (PRN contrast/bubble/strain/3D)  Result Date: 3/24/2025    Left Ventricle: Severely reduced left ventricular systolic function with a visually estimated EF of 25 - 30%. Left ventricle

## 2025-03-28 NOTE — PROGRESS NOTES
Hospitalist Progress Note      PCP: None, None    Date of Admission: 3/23/2025    Chief Complaint:    Chief Complaint   Patient presents with    Shortness of Breath     Subjective:  Patient denies fevers, chills, sweats, CP, SOB, diarrhea or burning micturition.  12 point ROS negative other than mentioned above     Medications:  Reviewed    Infusion Medications    sodium chloride      dextrose       Scheduled Medications    empagliflozin  10 mg Oral Daily    sodium chloride flush  5-40 mL IntraVENous 2 times per day    enoxaparin  60 mg SubCUTAneous BID    insulin lispro  0-8 Units SubCUTAneous 4x Daily AC & HS    atorvastatin  10 mg Oral Nightly    aspirin  81 mg Oral Daily    furosemide  80 mg IntraVENous BID    sacubitril-valsartan  1 tablet Oral BID    carvedilol  3.125 mg Oral BID      PRN Meds: sodium chloride flush, sodium chloride, potassium chloride **OR** potassium alternative oral replacement **OR** potassium chloride, magnesium sulfate, ondansetron **OR** ondansetron, polyethylene glycol, acetaminophen **OR** acetaminophen, glucose, dextrose bolus **OR** dextrose bolus, glucagon (rDNA), dextrose, labetalol, albuterol sulfate HFA      Intake/Output Summary (Last 24 hours) at 3/28/2025 1330  Last data filed at 3/28/2025 1306  Gross per 24 hour   Intake 880 ml   Output 3700 ml   Net -2820 ml     Exam:    BP 99/73   Pulse 93   Temp 98.8 °F (37.1 °C) (Oral)   Resp 20   Ht 2.032 m (6' 8\")   Wt (!) 263.7 kg (581 lb 5.7 oz)   SpO2 98%   BMI 63.86 kg/m²     General appearance: obese.  HEENT:  Conjunctivae/corneas clear.  Neck: Trachea midline.  Respiratory:  diminished  Cardiovascular: Regular rate and rhythm  Abdomen: Soft, non-tender, non-distended with normal bowel sounds.  Musculoskeletal: +3 edema  Neuro: Non Focal.     Labs:   Recent Labs     03/26/25  0512 03/26/25  1218 03/27/25  0534 03/28/25  0551   WBC 7.7  --  8.2 11.0*   HGB 12.1* 13.0* 12.8* 13.7*   HCT 36.8*  --  39.6* 42.6     --

## 2025-03-28 NOTE — PROGRESS NOTES
Cardiology Follow up Note    Subjective:  Feels much better.  Significantly less leg swelling. No CP or SOB.  He wants to go home.      Review of Systems:   As noted in the HPI*    Objective:  BP (!) 115/55   Pulse 94   Temp 98.6 °F (37 °C) (Oral)   Resp 18   Ht 2.032 m (6' 8\")   Wt (!) 263.7 kg (581 lb 5.7 oz)   SpO2 96%   BMI 63.86 kg/m²   Constitutional: alert, cooperative, in no distress  Eyes: Conjunctiva clear; no scleral icturus. PERRLA   Respiratory: diminished breath sounds bilaterally.   Musculoskeletal: No chest wall tenderness. No clubbing or cyanosis.   Cardiovascular: regular rate. Distant heart sounds. No carotid bruit. No JVD. Pulses 2+ and symmetric. 2+ edema with chronic venous changes.   GI: soft, non-tender, non-distended. Bowel sounds normal. Morbidly obese.  MSK: extremities normal, atraumatic, no clubbing. No chest wall pain.    Neurologic: Cranial nerves grossly intact.  No focal motor and/or sensory deficits.  Skin: No rashes or lesions. No cyanosis.       Intake/Output Summary (Last 24 hours) at 3/28/2025 1624  Last data filed at 3/28/2025 1336  Gross per 24 hour   Intake 680 ml   Output 3950 ml   Net -3270 ml       Medications:  empagliflozin, 10 mg, Daily  sodium chloride flush, 5-40 mL, 2 times per day  enoxaparin, 60 mg, BID  insulin lispro, 0-8 Units, 4x Daily AC & HS  atorvastatin, 10 mg, Nightly  aspirin, 81 mg, Daily  furosemide, 80 mg, BID  sacubitril-valsartan, 1 tablet, BID  carvedilol, 3.125 mg, BID WC      sodium chloride  dextrose      Recent Labs     03/26/25  0512 03/26/25  1218 03/27/25  0534 03/28/25  0551   HGB 12.1* 13.0* 12.8* 13.7*   WBC 7.7  --  8.2 11.0*     --  336 388     --  142 142   K 3.2*  --  3.9 3.9   CO2 30  --  32* 29   BUN 9  --  10 13   MG 1.8  --  2.3 2.2     Cr 0.82    Telemetry: My independent review reveals SR 70s.  No ventricular arrhythmias seen to date.       Echo (TTE) complete (PRN contrast/bubble/strain/3D)

## 2025-03-28 NOTE — PROGRESS NOTES
Pulmonary Progress Note    3/28/2025 10:26 AM    Subjective:   Admit Date: 3/23/2025  PCP: None, None    Chief Complaint   Patient presents with    Shortness of Breath     Interval History: Continues to be diuresing well.  Continues to be on room air.    14 points review of systems has been obtained and negative except to was mentioned in HPI.     Medications:   Scheduled Meds:   empagliflozin  10 mg Oral Daily    sodium chloride flush  5-40 mL IntraVENous 2 times per day    enoxaparin  60 mg SubCUTAneous BID    insulin lispro  0-8 Units SubCUTAneous 4x Daily AC & HS    atorvastatin  10 mg Oral Nightly    aspirin  81 mg Oral Daily    furosemide  80 mg IntraVENous BID    sacubitril-valsartan  1 tablet Oral BID    carvedilol  3.125 mg Oral BID WC     Continuous Infusions:   sodium chloride      dextrose           Objective:   Vitals:   Temp (24hrs), Av.9 °F (36.6 °C), Min:97.5 °F (36.4 °C), Max:98.1 °F (36.7 °C)    BP (!) 111/55   Pulse 91   Temp 98.1 °F (36.7 °C)   Resp 20   Ht 2.032 m (6' 8\")   Wt (!) 263.7 kg (581 lb 5.7 oz)   SpO2 96%   BMI 63.86 kg/m²   I/O:24HR INTAKE/OUTPUT:    Intake/Output Summary (Last 24 hours) at 3/28/2025 1026  Last data filed at 3/28/2025 0804  Gross per 24 hour   Intake 1060 ml   Output 4300 ml   Net -3240 ml      0701 -  0700  In: 1260 [P.O.:1260]  Out: 4600 [Urine:4600]  CVP:        Physical Exam:   General appearance - alert, ill appearing, and in mild distress  Mental status - alert, oriented to person, place, and time  Eyes - pupils equal and reactive, extraocular eye movements intact. Normal sclera and conjunctiva   Nose - normal and patent, nasal cannula oxygen  Neck -thick neck, supple, no significant adenopathy. No thyromegaly.   Chest -diminished bilaterally to auscultation, no wheezes, rales or rhonchi, symmetric air entry  Heart - normal rate, regular rhythm, normal S1, S2  Abdomen obese, soft, nontender. Bowel sounds present. No hernia   Rectal -  deferred, not clinically indicated  Neurological - alert, oriented, normal speech, no focal findings or movement disorder noted.  Musculoskeletal - no joint tenderness, deformity or swelling  Extremities - peripheral pulses normal, bilateral pedal edema.  Chronic skin changes.  Skin - normal coloration and turgor, no rashes   Psych: Normal mood         BMP:    Recent Labs     03/26/25  0512 03/26/25  1218 03/27/25  0534 03/28/25  0551     --  142 142   K 3.2*  --  3.9 3.9   CL 99  --  99 98   CO2 30  --  32* 29   BUN 9  --  10 13   CREATININE 0.74   < > 0.90 0.82   GLUCOSE 135*  --  142* 151*   MG 1.8  --  2.3 2.2    < > = values in this interval not displayed.    .  MG:3,PHOS:3)@  Ionized Calcium: No components found for: \"IONCA\"  CBC:   Recent Labs     03/27/25  0534 03/28/25  0551   WBC 8.2 11.0*   HGB 12.8* 13.7*    388      ABG: No results for input(s): \"PH\", \"PCO2\", \"PO2\" in the last 72 hours.        Assessment and Plan:         Impression:     -  Acute respiratory insufficiency.  Currently on 2 L of oxygen.  -Hypertensive emergency.  This is overall better  -Systolic heart failure with mild exacerbation.  -Lower extremity edema.  -Volume overload.  -Recent COVID-19 pneumonia.  -Obstructive sleep apnea.               Recommendations:     - Oxygen to keep saturation above 90%.  -Continue IV Lasix.  Strict intake and output measurement.     -  Blood pressure control  - Continue cardiac medications.  - Low-salt diet.  - Maintain potassium above 4 and magnesium above 2.  - Positive airway pressure at nighttime.  -Will arrange for split-night sleep study as an outpatient.              Electronically signed by Marilu Gamez MD on 3/28/2025 at 10:26 AM

## 2025-03-28 NOTE — DISCHARGE INSTR - DIET
Good nutrition is important when healing from an illness, injury, or surgery.  Follow any nutrition recommendations given to you during your hospital stay.   If you were given an oral nutrition supplement while in the hospital, continue to take this supplement at home.  You can take it with meals, in-between meals, and/or before bedtime. These supplements can be purchased at most local grocery stores, pharmacies, and chain [employer]-stores.   If you have any questions about your diet or nutrition, call the hospital and ask for the dietitian.  Low sodium/Low fat/Low cholesterol/High fiber diet         2 liter fluid restriction today

## 2025-03-28 NOTE — FLOWSHEET NOTE
Pt is in bed resting comfortably. He is A/O/ x 4, independent. Takes his po medications whole w/ water. 2000 ml FR. Last BM on 03/27/2025, no c/o constipation, abd pain, or diarrhea. HS BS at 163, no SSIC needed. Wears a Bipap at HS. Assessment completed. Pt has a 20 ga IV in his Left hand and a 20 ga IV in his Right arm, both are clean, dry, and intact. No c/o pain at this time. Call light is w/in reach.

## 2025-03-28 NOTE — DISCHARGE SUMMARY
Hospital Medicine Discharge Summary    Attila Salinas  :  1990  MRN:  [id number]    Admit date:  3/23/2025  Discharge date:  3/28/2025    Admitting Physician:  Chandler Hu MD  Primary Care Physician:  None, None      Discharge Diagnoses:    Acute on chronic combined systolic and diastolic CHF secondary dilated CM to obesity hypoventilation; untreated JIHAN and probable pulm HTN     Chief Complaint   Patient presents with    Shortness of Breath     Hospital Course:   Patient presented with acute hypoxic respiratory failure secondary to Acute on chronic combined systolic and diastolic CHF secondary dilated CM to obesity hypoventilation; untreated JIHAN and probable pulm HTN.  Diuresed over 24L and is now on room air doing well.  Discharge on lasix per cardiology recommendations     Exam on discharge:   /67   Pulse 95   Temp 98.6 °F (37 °C) (Oral)   Resp 18   Ht 2.032 m (6' 8\")   Wt (!) 263.7 kg (581 lb 5.7 oz)   SpO2 96%   BMI 63.86 kg/m²   General appearance: obese.  HEENT:  Conjunctivae/corneas clear.  Neck: Trachea midline.  Respiratory:  diminished  Cardiovascular: Regular rate and rhythm  Abdomen: Soft, non-tender, non-distended with normal bowel sounds.  Musculoskeletal: +3 edema  Neuro: Non Focal.     Patient was seen by the following consultants   Consults:  IP CONSULT TO CARDIOLOGY  IP CONSULT TO PULMONOLOGY    Significant Diagnostic Studies:    Refer to chart     Please refer to chart if no studies are shown here    Echo (TTE) complete (PRN contrast/bubble/strain/3D)  Result Date: 3/24/2025    Left Ventricle: Severely reduced left ventricular systolic function with a visually estimated EF of 25 - 30%. Left ventricle is severely dilated. Normal wall thickness. Severe global hypokinesis present. Abnormal diastolic function. NO LV APICAL THROMBUS   Right Ventricle: Normal systolic function.   Mitral Valve: Mild regurgitation.   Left Atrium: Left atrium is mildly dilated.   Pericardium: No

## 2025-03-28 NOTE — CARE COORDINATION
PT CONTINUES ON IV LASIX.  DC PLAN HOME ONCE CLEARED BY SANDRA HERRERA AND JARDIANCE 30 DAY FREE COUPON ON CHART.

## 2025-03-31 DIAGNOSIS — E11.9 NEW ONSET TYPE 2 DIABETES MELLITUS (HCC): Primary | ICD-10-CM

## 2025-04-01 ENCOUNTER — TELEPHONE (OUTPATIENT)
Age: 35
End: 2025-04-01

## 2025-04-01 NOTE — TELEPHONE ENCOUNTER
Per 2- telephone encounter, Meg NEWSOME spoke to patient regarding Mounjaro not being on his formulary. He is willing to try Trulicity and would like an RX sent to [facility] pharmacy on [address]    Dr. Cruz sent Trulicity prescription to pharmacy.  Mounjaro prior authorization does not need to be done at this time.

## 2025-04-04 ENCOUNTER — OFFICE VISIT (OUTPATIENT)
Age: 35
End: 2025-04-04
Payer: COMMERCIAL

## 2025-04-04 VITALS
WEIGHT: 315 LBS | HEART RATE: 98 BPM | RESPIRATION RATE: 16 BRPM | OXYGEN SATURATION: 96 % | BODY MASS INDEX: 63.61 KG/M2 | DIASTOLIC BLOOD PRESSURE: 68 MMHG | SYSTOLIC BLOOD PRESSURE: 112 MMHG

## 2025-04-04 DIAGNOSIS — I73.9 PAD (PERIPHERAL ARTERY DISEASE): ICD-10-CM

## 2025-04-04 DIAGNOSIS — E78.2 MIXED HYPERLIPIDEMIA: ICD-10-CM

## 2025-04-04 DIAGNOSIS — E66.01 MORBID OBESITY: ICD-10-CM

## 2025-04-04 DIAGNOSIS — I42.0 DILATED CARDIOMYOPATHY (HCC): ICD-10-CM

## 2025-04-04 DIAGNOSIS — I87.2 VENOUS INSUFFICIENCY: ICD-10-CM

## 2025-04-04 DIAGNOSIS — I50.41 CHF (CONGESTIVE HEART FAILURE), NYHA CLASS IV, ACUTE, COMBINED (HCC): Primary | ICD-10-CM

## 2025-04-04 DIAGNOSIS — I10 PRIMARY HYPERTENSION: ICD-10-CM

## 2025-04-04 PROCEDURE — 99203 OFFICE O/P NEW LOW 30 MIN: CPT | Performed by: INTERNAL MEDICINE

## 2025-04-04 RX ORDER — DOBUTAMINE HYDROCHLORIDE 200 MG/100ML
10 INJECTION INTRAVENOUS CONTINUOUS
OUTPATIENT
Start: 2025-04-04

## 2025-04-04 ASSESSMENT — ENCOUNTER SYMPTOMS
EYES NEGATIVE: 1
SHORTNESS OF BREATH: 1
WHEEZING: 0
GASTROINTESTINAL NEGATIVE: 1
ALLERGIC/IMMUNOLOGIC NEGATIVE: 1
ABDOMINAL PAIN: 0
NAUSEA: 0
VOMITING: 0

## 2025-04-04 NOTE — PROGRESS NOTES
Chief Complaint   Patient presents with    New Patient       Patient presents for initial medical evaluation. Patient is followed on a regular basis by Dr. Whitt, None.  Patient with past medical history of dilated cardiomyopathy with severe LV dysfunction ejection fraction of 25 to 30%, hypertension, hyperlipidemia, diabetes, moderate PAD, morbid obesity class III, chronic HFrEF.  Status post lower extremity arterial duplex ultrasound examination showing likely greater than 50% left popliteal artery stenosis.  Monophasic waveform in the left AT  Patient without prior history of cardiac catheterization, patient's size is over table limit.  Most recent echocardiogram in March 2025 ejection fraction of 25 to 30%.  No LV apical thrombus  Patient with left extremity cellulitis current/chronic venous insufficiency changes      Patient Active Problem List   Diagnosis    Acute respiratory failure with hypoxia    New onset type 2 diabetes mellitus (HCC)    COVID-19    CHF (congestive heart failure), NYHA class IV, acute, combined (HCC)    Acute on chronic HFrEF (heart failure with reduced ejection fraction) (HCC)    Morbid obesity    Mixed hyperlipidemia    Primary hypertension    PAD (peripheral artery disease)       No past surgical history on file.    Social History     Socioeconomic History    Marital status: Single   Tobacco Use    Smoking status: Never    Smokeless tobacco: Never   Vaping Use    Vaping status: Never Used   Substance and Sexual Activity    Alcohol use: No    Drug use: No    Sexual activity: Yes     Partners: Female     Social Drivers of Health     Financial Resource Strain: Low Risk  (3/29/2024)    [facility]    Overall Financial Resource Strain (CARDIA)     Difficulty of Paying Living Expenses: Not hard at all   Food Insecurity: No Food Insecurity (3/24/2025)    Hunger Vital Sign     Worried About Running Out of Food in the Last Year: Never true     Ran Out of Food

## 2025-04-23 ENCOUNTER — PATIENT MESSAGE (OUTPATIENT)
Age: 35
End: 2025-04-23

## 2025-04-24 DIAGNOSIS — E11.9 NEW ONSET TYPE 2 DIABETES MELLITUS (HCC): ICD-10-CM

## 2025-04-24 LAB
ANION GAP SERPL CALCULATED.3IONS-SCNC: 16 MEQ/L (ref 9–15)
BUN SERPL-MCNC: 19 MG/DL (ref 6–20)
CALCIUM SERPL-MCNC: 9.7 MG/DL (ref 8.5–9.9)
CHLORIDE SERPL-SCNC: 103 MEQ/L (ref 95–107)
CHOLEST SERPL-MCNC: 138 MG/DL (ref 0–199)
CO2 SERPL-SCNC: 21 MEQ/L (ref 20–31)
CREAT SERPL-MCNC: 1.05 MG/DL (ref 0.7–1.2)
ESTIMATED AVERAGE GLUCOSE: 146 MG/DL
GLUCOSE SERPL-MCNC: 128 MG/DL (ref 70–99)
HBA1C MFR BLD: 6.7 % (ref 4–6)
HDLC SERPL-MCNC: 30 MG/DL (ref 40–59)
LDLC SERPL CALC-MCNC: 70 MG/DL (ref 0–129)
POTASSIUM SERPL-SCNC: 4.6 MEQ/L (ref 3.4–4.9)
SODIUM SERPL-SCNC: 140 MEQ/L (ref 135–144)
TRIGL SERPL-MCNC: 189 MG/DL (ref 0–150)

## 2025-04-28 DIAGNOSIS — E11.9 NEW ONSET TYPE 2 DIABETES MELLITUS (HCC): Primary | ICD-10-CM

## 2025-05-01 ENCOUNTER — HOSPITAL ENCOUNTER (OUTPATIENT)
Dept: SLEEP CENTER | Age: 35
Discharge: HOME | End: 2025-05-03
Payer: COMMERCIAL

## 2025-05-01 PROCEDURE — 95810 POLYSOM 6/> YRS 4/> PARAM: CPT

## 2025-05-02 NOTE — PROGRESS NOTES
Vascular Medicine and Interventional Radiology:    Attila Salinas, a male of 35 y.o. came to the office 5/5/2025.     Chief Complaint   Patient presents with    New Patient     Consult- Venous Insufficiency- Dr Sloan       HPI: Attila Salinas came to the office 5/5/25, referred by Dr. Sloan for evaluation of venous insufficiency. Patient presents with symptoms of intermittent BLE swelling that began approximately 3 months ago.  Also with BLE pain that occurs with swelling.  States that the legs felt tight and heavy with the swelling. Endorses skin dryness and cracking.  Also with recent history of LLE wound/cellulitis.  The wound began as a scratch but he developed swelling to the area, then developed cellulitis.  His symptoms of erythema, swelling and pain were not improving with antibiotics so he presented to the ED. Pulses were difficult to palpate at that time, therefore arterial US was performed with results of moderate PAD.  He currently follows with Dr. Sloan for management.  Patient was recently admitted for acute hypoxic respiratory failure secondary to acute on chronic combined systolic and diastolic CHF.  States that he had significant leg swelling, but was started on Lasix approximately a month ago which is reduced his swelling substantially.  At this time, patient states that he does not have pain or substantial swelling in the legs.   Denies history of DVT. Denies claudication-like symptoms. Does not elevate his legs at home.  He is on a low-sodium diet.  He wears OTC knee-high compression stockings daily.  Denies chest pain. Denies dyspnea.     Risk factors: Age, family history of venous disease (grandfather), prolonged sitting, increased BMI    No family history on file.    No past surgical history on file.     Medical history: PAD, HTN, DM2, dilated cardiomyopathy with severe LV dysfunction, hyperlipidemia, chronic HFrEF    Past Medical History:   Diagnosis Date    Cellulitis     CHF

## 2025-05-05 ENCOUNTER — OFFICE VISIT (OUTPATIENT)
Age: 35
End: 2025-05-05
Payer: COMMERCIAL

## 2025-05-05 VITALS
OXYGEN SATURATION: 97 % | HEIGHT: 78 IN | BODY MASS INDEX: 36.45 KG/M2 | WEIGHT: 315 LBS | SYSTOLIC BLOOD PRESSURE: 104 MMHG | RESPIRATION RATE: 17 BRPM | HEART RATE: 104 BPM | DIASTOLIC BLOOD PRESSURE: 82 MMHG

## 2025-05-05 DIAGNOSIS — R60.0 BILATERAL LEG EDEMA: Primary | ICD-10-CM

## 2025-05-05 DIAGNOSIS — R29.898 LEG HEAVINESS: ICD-10-CM

## 2025-05-05 DIAGNOSIS — M79.605 BILATERAL LEG PAIN: ICD-10-CM

## 2025-05-05 DIAGNOSIS — I83.11 VARICOSE VEINS OF BOTH LOWER EXTREMITIES WITH INFLAMMATION: ICD-10-CM

## 2025-05-05 DIAGNOSIS — M79.604 BILATERAL LEG PAIN: ICD-10-CM

## 2025-05-05 DIAGNOSIS — I83.12 VARICOSE VEINS OF BOTH LOWER EXTREMITIES WITH INFLAMMATION: ICD-10-CM

## 2025-05-05 PROCEDURE — 3074F SYST BP LT 130 MM HG: CPT

## 2025-05-05 PROCEDURE — 3079F DIAST BP 80-89 MM HG: CPT

## 2025-05-05 PROCEDURE — 99204 OFFICE O/P NEW MOD 45 MIN: CPT

## 2025-05-05 PROCEDURE — 1036F TOBACCO NON-USER: CPT

## 2025-05-05 PROCEDURE — 99203 OFFICE O/P NEW LOW 30 MIN: CPT

## 2025-05-05 PROCEDURE — G8417 CALC BMI ABV UP PARAM F/U: HCPCS

## 2025-05-05 PROCEDURE — G8427 DOCREV CUR MEDS BY ELIG CLIN: HCPCS

## 2025-05-16 ENCOUNTER — RESULTS FOLLOW-UP (OUTPATIENT)
Dept: PULMONOLOGY | Age: 35
End: 2025-05-16

## 2025-05-20 ENCOUNTER — HOSPITAL ENCOUNTER (OUTPATIENT)
Age: 35
Discharge: HOME | End: 2025-05-22
Attending: INTERNAL MEDICINE
Payer: COMMERCIAL

## 2025-05-20 VITALS
WEIGHT: 315 LBS | SYSTOLIC BLOOD PRESSURE: 112 MMHG | BODY MASS INDEX: 36.45 KG/M2 | DIASTOLIC BLOOD PRESSURE: 68 MMHG | HEIGHT: 78 IN

## 2025-05-20 DIAGNOSIS — I42.0 DILATED CARDIOMYOPATHY (HCC): ICD-10-CM

## 2025-05-20 LAB
ECHO BSA: 3.48 M2
ECHO EST RA PRESSURE: 3 MMHG
STRESS BASELINE ST DEPRESSION: 0 MM
STRESS EXERCISE DUR MIN: 11 MIN
STRESS EXERCISE DUR SEC: 4 SEC
STRESS ST DEPRESSION: 0 MM
STRESS TARGET HR: 184 BPM

## 2025-05-20 PROCEDURE — 36415 COLL VENOUS BLD VENIPUNCTURE: CPT

## 2025-05-20 PROCEDURE — 6360000004 HC RX CONTRAST MEDICATION: Performed by: INTERNAL MEDICINE

## 2025-05-20 PROCEDURE — 93350 STRESS TTE ONLY: CPT

## 2025-05-20 PROCEDURE — 6360000002 HC RX W HCPCS: Performed by: INTERNAL MEDICINE

## 2025-05-20 RX ORDER — DOBUTAMINE HYDROCHLORIDE 200 MG/100ML
10 INJECTION INTRAVENOUS CONTINUOUS
Status: DISCONTINUED | OUTPATIENT
Start: 2025-05-20 | End: 2025-05-23 | Stop reason: HOSPADM

## 2025-05-20 RX ORDER — SODIUM CHLORIDE 9 MG/ML
INJECTION, SOLUTION INTRAVENOUS CONTINUOUS
Status: DISCONTINUED | OUTPATIENT
Start: 2025-05-20 | End: 2025-05-23 | Stop reason: HOSPADM

## 2025-05-20 RX ORDER — ATROPINE SULFATE 0.1 MG/ML
2 INJECTION INTRAVENOUS ONCE
Status: COMPLETED | OUTPATIENT
Start: 2025-05-20 | End: 2025-05-20

## 2025-05-20 RX ORDER — METOPROLOL TARTRATE 1 MG/ML
5 INJECTION, SOLUTION INTRAVENOUS EVERY 6 HOURS
Status: DISCONTINUED | OUTPATIENT
Start: 2025-05-20 | End: 2025-05-23 | Stop reason: HOSPADM

## 2025-05-20 RX ADMIN — ATROPINE SULFATE 1 MG: 0.1 INJECTION INTRAVENOUS at 13:54

## 2025-05-20 RX ADMIN — SULFUR HEXAFLUORIDE 2 ML: 60.7; .19; .19 INJECTION, POWDER, LYOPHILIZED, FOR SUSPENSION INTRAVENOUS; INTRAVESICAL at 13:50

## 2025-05-20 RX ADMIN — DOBUTAMINE HYDROCHLORIDE 5 MCG/KG/MIN: 200 INJECTION INTRAVENOUS at 13:48

## 2025-05-20 NOTE — PROGRESS NOTES
Vascular Medicine and Interventional Radiology:    Attila Salinas, a male of 35 y.o. was evaluated via telephone on 5/22/2025.     Chief Complaint   Patient presents with    US results     5/22/25 Visit: Attila Salinas returns via phone call for venous ultrasound insufficiency studies results.  He reports that his BLE swelling has improved with taking Lasix daily and wearing compression stockings daily.  He ordered OTC thigh-high 20-30 compression stockings and he wears those some days and wears his knee-high class II CS on other days.  His left lower extremity wound is fully healed at this time and beginning to fade. Denies new wounds. He denies pain to the legs.    HPI: Attila Salinas came to the office 5/5/25, referred by Dr. Sloan for evaluation of venous insufficiency. Patient presents with symptoms of intermittent BLE swelling that began approximately 3 months ago.  Also with BLE pain that occurs with swelling.  States that the legs felt tight and heavy with the swelling. Endorses skin dryness and cracking.  Also with recent history of LLE wound/cellulitis.  The wound began as a scratch but he developed swelling to the area, then developed cellulitis.  His symptoms of erythema, swelling and pain were not improving with antibiotics so he presented to the ED. Pulses were difficult to palpate at that time, therefore arterial US was performed with results of moderate PAD.  He currently follows with Dr. Sloan for management.  Patient was recently admitted for acute hypoxic respiratory failure secondary to acute on chronic combined systolic and diastolic CHF.  States that he had significant leg swelling, but was started on Lasix approximately a month ago which is reduced his swelling substantially.  At this time, patient states that he does not have pain or substantial swelling in the legs.   Denies history of DVT. Denies claudication-like symptoms. Does not elevate his legs at home.  He is on a low-sodium diet.

## 2025-05-21 RX ORDER — FUROSEMIDE 40 MG/1
40 TABLET ORAL DAILY
Qty: 90 TABLET | Refills: 2 | Status: SHIPPED | OUTPATIENT
Start: 2025-05-21

## 2025-05-21 NOTE — TELEPHONE ENCOUNTER
Requesting medication refill. Please approve or deny this request.    Rx requested:  Requested Prescriptions     Pending Prescriptions Disp Refills    furosemide (LASIX) 40 MG tablet 30 tablet 1     Sig: Take 1 tablet by mouth daily    sacubitril-valsartan (ENTRESTO) 24-26 MG per tablet 60 tablet 1     Sig: Take 1 tablet by mouth 2 times daily         Last Office Visit:   4/4/2025      Next Visit Date:  Future Appointments   Date Time Provider Department Center   5/22/2025  1:00 PM Nabor, Antoinette, PA-C [facility]   6/2/2025  1:00 PM Marilu Gamez MD [facility]

## 2025-05-22 ENCOUNTER — TELEMEDICINE (OUTPATIENT)
Age: 35
End: 2025-05-22

## 2025-05-22 DIAGNOSIS — I87.2 CHRONIC VENOUS INSUFFICIENCY: Primary | ICD-10-CM

## 2025-05-22 DIAGNOSIS — R29.898 LEG HEAVINESS: ICD-10-CM

## 2025-05-22 DIAGNOSIS — I83.11 VARICOSE VEINS OF BOTH LOWER EXTREMITIES WITH INFLAMMATION: ICD-10-CM

## 2025-05-22 DIAGNOSIS — M79.605 BILATERAL LEG PAIN: ICD-10-CM

## 2025-05-22 DIAGNOSIS — I83.12 VARICOSE VEINS OF BOTH LOWER EXTREMITIES WITH INFLAMMATION: ICD-10-CM

## 2025-05-22 DIAGNOSIS — M79.604 BILATERAL LEG PAIN: ICD-10-CM

## 2025-05-22 DIAGNOSIS — R60.0 BILATERAL LEG EDEMA: ICD-10-CM

## 2025-05-24 DIAGNOSIS — E11.9 NEW ONSET TYPE 2 DIABETES MELLITUS (HCC): ICD-10-CM

## 2025-05-27 DIAGNOSIS — E11.9 NEW ONSET TYPE 2 DIABETES MELLITUS (HCC): ICD-10-CM

## 2025-06-02 ENCOUNTER — OFFICE VISIT (OUTPATIENT)
Age: 35
End: 2025-06-02
Payer: COMMERCIAL

## 2025-06-02 VITALS
HEIGHT: 78 IN | BODY MASS INDEX: 36.45 KG/M2 | HEART RATE: 85 BPM | TEMPERATURE: 97.8 F | OXYGEN SATURATION: 98 % | RESPIRATION RATE: 17 BRPM | SYSTOLIC BLOOD PRESSURE: 110 MMHG | DIASTOLIC BLOOD PRESSURE: 70 MMHG | WEIGHT: 315 LBS

## 2025-06-02 DIAGNOSIS — G47.33 OSA (OBSTRUCTIVE SLEEP APNEA): Primary | ICD-10-CM

## 2025-06-02 DIAGNOSIS — G47.34 SLEEP RELATED HYPOXIA: ICD-10-CM

## 2025-06-02 DIAGNOSIS — I50.20 SYSTOLIC CONGESTIVE HEART FAILURE, UNSPECIFIED HF CHRONICITY (HCC): ICD-10-CM

## 2025-06-02 PROCEDURE — 99212 OFFICE O/P EST SF 10 MIN: CPT | Performed by: INTERNAL MEDICINE

## 2025-06-02 PROCEDURE — 3074F SYST BP LT 130 MM HG: CPT | Performed by: INTERNAL MEDICINE

## 2025-06-02 PROCEDURE — G8417 CALC BMI ABV UP PARAM F/U: HCPCS | Performed by: INTERNAL MEDICINE

## 2025-06-02 PROCEDURE — 3078F DIAST BP <80 MM HG: CPT | Performed by: INTERNAL MEDICINE

## 2025-06-02 PROCEDURE — 1036F TOBACCO NON-USER: CPT | Performed by: INTERNAL MEDICINE

## 2025-06-02 PROCEDURE — G8427 DOCREV CUR MEDS BY ELIG CLIN: HCPCS | Performed by: INTERNAL MEDICINE

## 2025-06-02 PROCEDURE — 99213 OFFICE O/P EST LOW 20 MIN: CPT | Performed by: INTERNAL MEDICINE

## 2025-06-02 NOTE — PROGRESS NOTES
PATIENT VISIT-PULMONARY/SLEEP    6/2/2025     REFERRING PHYSICIAN:  None, None     REASON FOR REFERRAL:  JIHAN    HPI:     Attila Salinas is a 35 y.o. male who was referred to sleep and pulmonary clinic for evaluation.     Has been hospitalized recently with hypertensive emergency and systolic heart failure exacerbation.  We arranged for sleep study as an outpatient.  This showed moderate JIHAN with sleep-related hypoxia.  He is tired and sleepy during the day and he dozes off easily.    He continues to be on diuretics.  Weight has been overall stable.       Past Medical History   Past Medical History:   Diagnosis Date    Cellulitis     CHF (congestive heart failure) (HCC)     Diabetes mellitus (HCC)     Hyperlipidemia     Hypertension        Past Surgical History  No past surgical history on file.    Allergies  Allergies   Allergen Reactions    Lactose Diarrhea       Medications  Current Outpatient Medications   Medication Sig Dispense Refill    dulaglutide (TRULICITY) 0.75 MG/0.5ML SOAJ SC injection Inject 0.5 mLs into the skin every 7 days 2 mL 0    furosemide (LASIX) 40 MG tablet Take 1 tablet by mouth daily 90 tablet 2    sacubitril-valsartan (ENTRESTO) 24-26 MG per tablet Take 1 tablet by mouth 2 times daily 180 tablet 2    Elastic Bandages & Supports (MEDICAL COMPRESSION STOCKINGS) MISC 1 each by Does not apply route daily Thigh-high.  20-30 mmHg. Open or close toed (patient preference).  Wear daily during the day and remove every evening before bed.  Wear as tolerated.  Do not wear if they cause increased pain. 1 each 5    carvedilol (COREG) 3.125 MG tablet Take 1 tablet by mouth 2 times daily (with meals) 60 tablet 3    potassium chloride (KLOR-CON M) 20 MEQ extended release tablet Take 1 tablet by mouth daily 30 tablet 1    empagliflozin (JARDIANCE) 10 MG tablet Take 1 tablet by mouth daily 30 tablet 3    aspirin 81 MG chewable tablet Take 1 tablet by mouth daily 30 tablet 3    Lancets MISC Test 3x

## 2025-06-09 PROBLEM — G47.33 OSA (OBSTRUCTIVE SLEEP APNEA): Status: ACTIVE | Noted: 2025-06-09

## 2025-06-21 DIAGNOSIS — E11.9 NEW ONSET TYPE 2 DIABETES MELLITUS (HCC): ICD-10-CM

## 2025-07-09 ENCOUNTER — OFFICE VISIT (OUTPATIENT)
Age: 35
End: 2025-07-09
Payer: COMMERCIAL

## 2025-07-09 VITALS
SYSTOLIC BLOOD PRESSURE: 132 MMHG | HEART RATE: 76 BPM | HEIGHT: 78 IN | DIASTOLIC BLOOD PRESSURE: 91 MMHG | BODY MASS INDEX: 36.45 KG/M2 | WEIGHT: 315 LBS | RESPIRATION RATE: 20 BRPM

## 2025-07-09 DIAGNOSIS — R29.898 LEG HEAVINESS: ICD-10-CM

## 2025-07-09 DIAGNOSIS — I87.2 CHRONIC VENOUS INSUFFICIENCY: Primary | ICD-10-CM

## 2025-07-09 DIAGNOSIS — M79.604 BILATERAL LEG PAIN: ICD-10-CM

## 2025-07-09 DIAGNOSIS — M79.605 BILATERAL LEG PAIN: ICD-10-CM

## 2025-07-09 DIAGNOSIS — I83.11 VARICOSE VEINS OF BOTH LOWER EXTREMITIES WITH INFLAMMATION: ICD-10-CM

## 2025-07-09 DIAGNOSIS — R60.0 BILATERAL LEG EDEMA: ICD-10-CM

## 2025-07-09 DIAGNOSIS — I83.12 VARICOSE VEINS OF BOTH LOWER EXTREMITIES WITH INFLAMMATION: ICD-10-CM

## 2025-07-09 PROCEDURE — 99211 OFF/OP EST MAY X REQ PHY/QHP: CPT

## 2025-07-09 NOTE — PROGRESS NOTES
Vascular Medicine and Interventional Radiology:    Attila Salinas, a male of 35 y.o. was evaluated on 7/9/2025.     Chief Complaint   Patient presents with    Follow-up     7 week follow up - compression stocking trial     7/9/25: Attila Salinas returns to discuss 7 week daily class II compression stocking trial. He reports that his bilateral lower extremity swelling, pain, dry skin has improved greatly with conservative treatment by wearing daily knee-high class II compression stockings and taking Lasix.  He denies development of new wounds to the bilateral lower extremities.  States he has been walking 30 minutes every day and denies pain with walking.  He did trial thigh-high class II compression stockings but states they were uncomfortable, so he is now wearing knee-high daily.  States he is keeping his skin moisturized with lotion every day.  He is not currently elevating his legs at home but will do so when he notices swelling increased.  He continues to follow with Dr. Sloan for PAD medical management.  He is overall happy with his improvement in symptoms with conservative treatment.    5/22/25 Visit: Attila Salinas returns via phone call for venous ultrasound insufficiency studies results.  He reports that his BLE swelling has improved with taking Lasix daily and wearing compression stockings daily.  He ordered OTC thigh-high 20-30 compression stockings and he wears those some days and wears his knee-high class II CS on other days.  His left lower extremity wound is fully healed at this time and beginning to fade. Denies new wounds. He denies pain to the legs.    HPI: Attila Salinas came to the office 5/5/25, referred by Dr. Sloan for evaluation of venous insufficiency. Patient presents with symptoms of intermittent BLE swelling that began approximately 3 months ago.  Also with BLE pain that occurs with swelling.  States that the legs felt tight and heavy with the swelling. Endorses skin dryness and

## 2025-07-11 ENCOUNTER — PATIENT MESSAGE (OUTPATIENT)
Age: 35
End: 2025-07-11

## 2025-07-11 RX ORDER — POTASSIUM CHLORIDE 1500 MG/1
20 TABLET, EXTENDED RELEASE ORAL DAILY
Qty: 90 TABLET | Refills: 1 | Status: SHIPPED | OUTPATIENT
Start: 2025-07-11

## 2025-07-11 NOTE — TELEPHONE ENCOUNTER
Requesting medication refill. Please approve or deny this request.    Rx requested:  Requested Prescriptions      No prescriptions requested or ordered in this encounter         Last Office Visit:   4/4/2025      Next Visit Date:  Future Appointments   Date Time Provider Department Center   9/2/2025  1:30 PM Marilu Gamez MD [facility]               Last refill 3/28/2025. Please approve or deny.

## 2025-07-21 ENCOUNTER — PATIENT MESSAGE (OUTPATIENT)
Age: 35
End: 2025-07-21

## 2025-07-21 DIAGNOSIS — E11.9 NEW ONSET TYPE 2 DIABETES MELLITUS (HCC): ICD-10-CM

## 2025-07-21 NOTE — TELEPHONE ENCOUNTER
Requesting medication refill. Please approve or deny this request.    Rx requested:  Requested Prescriptions     Pending Prescriptions Disp Refills    empagliflozin (JARDIANCE) 10 MG tablet 30 tablet 3     Sig: Take 1 tablet by mouth daily         Last Office Visit:   4/4/2025      Next Visit Date:  Future Appointments   Date Time Provider Department Center   7/25/2025  8:30 PM SCHEDULE, [facility]   9/2/2025  1:30 PM Marilu Gamez MD [facility]               Last refill 3/29/2025. Please approve or deny.

## 2025-07-23 NOTE — TELEPHONE ENCOUNTER
Requesting medication refill. Please approve or deny this request.    Rx requested:  Requested Prescriptions     Pending Prescriptions Disp Refills    aspirin 81 MG chewable tablet 30 tablet 3     Sig: Take 1 tablet by mouth daily    carvedilol (COREG) 3.125 MG tablet 60 tablet 3     Sig: Take 1 tablet by mouth 2 times daily (with meals)     Signed Prescriptions Disp Refills    empagliflozin (JARDIANCE) 10 MG tablet 30 tablet 5     Sig: Take 1 tablet by mouth daily     Authorizing Provider: CASSANDRA DANIEL         Last Office Visit:   4/4/2025      Next Visit Date:  Future Appointments   Date Time Provider Department Center   7/25/2025  8:30 PM [place] [facility]   9/2/2025  1:30 PM Marilu Gamez MD [facility]

## 2025-07-25 RX ORDER — ASPIRIN 81 MG/1
81 TABLET, CHEWABLE ORAL DAILY
Qty: 30 TABLET | Refills: 3 | Status: SHIPPED | OUTPATIENT
Start: 2025-07-25

## 2025-07-25 RX ORDER — CARVEDILOL 3.12 MG/1
3.12 TABLET ORAL 2 TIMES DAILY WITH MEALS
Qty: 60 TABLET | Refills: 3 | Status: SHIPPED | OUTPATIENT
Start: 2025-07-25

## 2025-08-07 ENCOUNTER — PATIENT MESSAGE (OUTPATIENT)
Age: 35
End: 2025-08-07

## 2025-08-08 ENCOUNTER — TELEPHONE (OUTPATIENT)
Age: 35
End: 2025-08-08

## 2025-08-21 RX ORDER — ATORVASTATIN CALCIUM 10 MG/1
10 TABLET, FILM COATED ORAL NIGHTLY
Qty: 90 TABLET | Refills: 1 | Status: SHIPPED | OUTPATIENT
Start: 2025-08-21